# Patient Record
Sex: MALE | Race: WHITE | NOT HISPANIC OR LATINO | Employment: FULL TIME | URBAN - METROPOLITAN AREA
[De-identification: names, ages, dates, MRNs, and addresses within clinical notes are randomized per-mention and may not be internally consistent; named-entity substitution may affect disease eponyms.]

---

## 2017-03-14 RX ORDER — ATORVASTATIN CALCIUM 80 MG/1
TABLET, FILM COATED ORAL
Qty: 90 TABLET | Refills: 1 | Status: SHIPPED | OUTPATIENT
Start: 2017-03-14 | End: 2017-07-05 | Stop reason: SDUPTHER

## 2017-07-05 RX ORDER — ATORVASTATIN CALCIUM 80 MG/1
80 TABLET, FILM COATED ORAL NIGHTLY
Qty: 90 TABLET | Refills: 1 | Status: SHIPPED | OUTPATIENT
Start: 2017-07-05 | End: 2018-06-24 | Stop reason: SDUPTHER

## 2017-08-02 ENCOUNTER — OFFICE VISIT (OUTPATIENT)
Dept: ENDOCRINOLOGY | Age: 47
End: 2017-08-02

## 2017-08-02 VITALS
OXYGEN SATURATION: 96 % | DIASTOLIC BLOOD PRESSURE: 70 MMHG | WEIGHT: 260 LBS | SYSTOLIC BLOOD PRESSURE: 112 MMHG | HEIGHT: 73 IN | BODY MASS INDEX: 34.46 KG/M2 | HEART RATE: 70 BPM

## 2017-08-02 DIAGNOSIS — E78.5 HYPERLIPIDEMIA, UNSPECIFIED HYPERLIPIDEMIA TYPE: ICD-10-CM

## 2017-08-02 DIAGNOSIS — G47.30 SLEEP APNEA, UNSPECIFIED TYPE: ICD-10-CM

## 2017-08-02 DIAGNOSIS — Z96.41 INSULIN PUMP STATUS: ICD-10-CM

## 2017-08-02 DIAGNOSIS — Z46.81 INSULIN PUMP TITRATION: ICD-10-CM

## 2017-08-02 DIAGNOSIS — E10.9 TYPE 1 DIABETES MELLITUS WITHOUT COMPLICATION (HCC): Primary | ICD-10-CM

## 2017-08-02 PROCEDURE — 99214 OFFICE O/P EST MOD 30 MIN: CPT | Performed by: INTERNAL MEDICINE

## 2017-08-02 NOTE — PROGRESS NOTES
Subjective   Rohith Henley is a 47 y.o. male.     HPI Comments: F/u for dm 1,hyperlipidemia,sleep apnea / testing bs 2-4  x day / last dm eye exam 1/27/17 with dr Almanza / last dm foot exam today with dr Gomez     Diabetes     Hyperlipidemia     Sleep Apnea        Patient is a 46-year-old male who came in for follow-up.  He has type 1 diabetes mellitus and started on a Medtronic 630 G insulin pump in 10/16     Basal rates: 12 AM is 2.1.  3 AM is 2.4.  6 AM is 3.5.       Bolus: 1 unit per 10 g of carbohydrate.     Sensitivity: 25     Target: 100-130.     He has a continuous glucose sensor in place.  Fasting blood sugar has been 140's.  Lunchtime blood sugars 160's.  Suppertime blood sugars 120-150.   He had 2 hypoglycemic episode at 2-3 AM over the past month.  His last meal was last night.     His last eye examination was in 1/17.  He has no retinopathy.  He denies any paresthesias.  He has no microalbuminuria on urine sample taken last March 2016.     He has hyperlipidemia and has been on Lipitor 40 mg once a day.  He denies any myalgia.  He has gained 8 lbs since 11/17.     He had a sleep study and was told to have mild sleep apnea.  He is not on CPAP.    The following portions of the patient's history were reviewed and updated as appropriate: allergies, current medications, past family history, past medical history, past social history, past surgical history and problem list.    Review of Systems   Constitutional: Negative.    HENT: Negative.    Eyes: Negative.    Respiratory: Negative.    Cardiovascular: Negative.    Gastrointestinal: Negative.    Endocrine: Negative.    Genitourinary: Negative.    Musculoskeletal: Positive for back pain (lower ).   Skin: Negative.    Allergic/Immunologic: Negative.    Neurological: Negative.    Hematological: Negative.    Psychiatric/Behavioral: Positive for sleep disturbance ( sleep apnea not on machine ).       Objective      Vitals:    08/02/17 1022   BP: 112/70   BP  "Location: Right arm   Patient Position: Sitting   Cuff Size: Large Adult   Pulse: 70   SpO2: 96%   Weight: 260 lb (118 kg)   Height: 73\" (185.4 cm)     Physical Exam   Constitutional: He is oriented to person, place, and time. He appears well-developed and well-nourished. No distress.   HENT:   Head: Normocephalic.   Nose: Nose normal.   Mouth/Throat: No oropharyngeal exudate.   Eyes: Conjunctivae and EOM are normal. Right eye exhibits no discharge. Left eye exhibits no discharge. No scleral icterus.   Neck: Neck supple. No JVD present. No tracheal deviation present. No thyromegaly present.   Cardiovascular: Normal rate, regular rhythm, normal heart sounds and intact distal pulses.  Exam reveals no gallop and no friction rub.    No murmur heard.  Pulmonary/Chest: Effort normal and breath sounds normal. No respiratory distress. He has no wheezes. He has no rales. He exhibits no tenderness.   Abdominal: Soft. Bowel sounds are normal. He exhibits no distension and no mass. There is no tenderness. No hernia.   Musculoskeletal: Normal range of motion. He exhibits no edema, tenderness or deformity.   No plantar ulcer   Lymphadenopathy:     He has no cervical adenopathy.   Neurological: He is alert and oriented to person, place, and time. He displays normal reflexes. Coordination normal.   Intact light touch   Skin: Skin is warm and dry. No rash noted. No erythema. No pallor.   Psychiatric: He has a normal mood and affect. His behavior is normal.     Office Visit on 11/30/2016   Component Date Value Ref Range Status   • Glucose 11/30/2016 244* 65 - 99 mg/dL Final   • BUN 11/30/2016 12  6 - 20 mg/dL Final   • Creatinine 11/30/2016 1.16  0.76 - 1.27 mg/dL Final   • eGFR Non  Am 11/30/2016 68  >60 mL/min/1.73 Final   • eGFR African Am 11/30/2016 82  >60 mL/min/1.73 Final   • BUN/Creatinine Ratio 11/30/2016 10.3  7.0 - 25.0 Final   • Sodium 11/30/2016 142  136 - 145 mmol/L Final   • Potassium 11/30/2016 4.9  3.5 - 5.2 " mmol/L Final   • Chloride 11/30/2016 101  98 - 107 mmol/L Final   • Total CO2 11/30/2016 28.7  22.0 - 29.0 mmol/L Final   • Calcium 11/30/2016 9.7  8.6 - 10.5 mg/dL Final   • Total Protein 11/30/2016 7.2  6.0 - 8.5 g/dL Final   • Albumin 11/30/2016 4.50  3.50 - 5.20 g/dL Final   • Globulin 11/30/2016 2.7  gm/dL Final   • A/G Ratio 11/30/2016 1.7  g/dL Final   • Total Bilirubin 11/30/2016 0.6  0.1 - 1.2 mg/dL Final   • Alkaline Phosphatase 11/30/2016 96  39 - 117 U/L Final   • AST (SGOT) 11/30/2016 14  1 - 40 U/L Final   • ALT (SGPT) 11/30/2016 20  1 - 41 U/L Final   • Total Cholesterol 11/30/2016 126  0 - 200 mg/dL Final   • Triglycerides 11/30/2016 53  0 - 150 mg/dL Final   • HDL Cholesterol 11/30/2016 42  40 - 60 mg/dL Final   • VLDL Cholesterol 11/30/2016 10.6  5 - 40 mg/dL Final   • LDL Cholesterol  11/30/2016 73  0 - 100 mg/dL Final   • Hemoglobin A1C 11/30/2016 10.20* 4.80 - 5.60 % Final    Comment: Hemoglobin A1C Ranges:  Increased Risk for Diabetes  5.7% to 6.4%  Diabetes                     >= 6.5%  Diabetic Goal                < 7.0%     • Fructosamine 11/30/2016 427* 0 - 285 umol/L Final    Comment: Published reference interval for apparently healthy subjects  between age 20 and 60 is 205 - 285 umol/L and in a poorly  controlled diabetic population is 228 - 563 umol/L with a  mean of 396 umol/L.       Assessment/Plan   Rohith was seen today for diabetes, hyperlipidemia and sleep apnea.    Diagnoses and all orders for this visit:    Type 1 diabetes mellitus without complication  -     Comprehensive Metabolic Panel  -     Hemoglobin A1c  -     TSH  -     T4, Free  -     Microalbumin / Creatinine Urine Ratio    Insulin pump titration  -     Comprehensive Metabolic Panel  -     Hemoglobin A1c  -     TSH  -     T4, Free    Insulin pump status  -     TSH  -     T4, Free    Hyperlipidemia, unspecified hyperlipidemia type  -     Lipid Panel  -     TSH  -     T4, Free    Sleep apnea, unspecified type  -     TSH  -      T4, Free      Change basal rate:  12 AM is 2.0.  3 AM is 2.2.  6 AM is 3.5.  Continue with current bolus and sensitivity settings.  Check hemoglobin A1c and urine microalbumin.  Continue Lipitor 40 mg once a day.  Check lipid profile.  Check thyroid function tests.    Send copy of my notes and labs to Dr. Frankie Merritt    RTC 4 mos.

## 2017-08-03 LAB
ALBUMIN SERPL-MCNC: 4.6 G/DL (ref 3.5–5.2)
ALBUMIN/CREAT UR: 2.5 MG/G CREAT (ref 0–30)
ALBUMIN/GLOB SERPL: 1.6 G/DL
ALP SERPL-CCNC: 95 U/L (ref 39–117)
ALT SERPL-CCNC: 24 U/L (ref 1–41)
AST SERPL-CCNC: 17 U/L (ref 1–40)
BILIRUB SERPL-MCNC: 0.7 MG/DL (ref 0.1–1.2)
BUN SERPL-MCNC: 9 MG/DL (ref 6–20)
BUN/CREAT SERPL: 8.6 (ref 7–25)
CALCIUM SERPL-MCNC: 10 MG/DL (ref 8.6–10.5)
CHLORIDE SERPL-SCNC: 103 MMOL/L (ref 98–107)
CHOLEST SERPL-MCNC: 133 MG/DL (ref 0–200)
CO2 SERPL-SCNC: 28.1 MMOL/L (ref 22–29)
CREAT SERPL-MCNC: 1.05 MG/DL (ref 0.76–1.27)
CREAT UR-MCNC: 134.9 MG/DL
GLOBULIN SER CALC-MCNC: 2.9 GM/DL
GLUCOSE SERPL-MCNC: 58 MG/DL (ref 65–99)
HBA1C MFR BLD: 9.7 % (ref 4.8–5.6)
HDLC SERPL-MCNC: 48 MG/DL (ref 40–60)
LDLC SERPL CALC-MCNC: 73 MG/DL (ref 0–100)
MICROALBUMIN UR-MCNC: 3.4 UG/ML
POTASSIUM SERPL-SCNC: 4.3 MMOL/L (ref 3.5–5.2)
PROT SERPL-MCNC: 7.5 G/DL (ref 6–8.5)
SODIUM SERPL-SCNC: 144 MMOL/L (ref 136–145)
T4 FREE SERPL-MCNC: 1.26 NG/DL (ref 0.93–1.7)
TRIGL SERPL-MCNC: 60 MG/DL (ref 0–150)
TSH SERPL DL<=0.005 MIU/L-ACNC: 1.66 MIU/ML (ref 0.27–4.2)
VLDLC SERPL CALC-MCNC: 12 MG/DL (ref 5–40)

## 2017-12-13 ENCOUNTER — OFFICE VISIT (OUTPATIENT)
Dept: ENDOCRINOLOGY | Age: 47
End: 2017-12-13

## 2017-12-13 VITALS
SYSTOLIC BLOOD PRESSURE: 126 MMHG | DIASTOLIC BLOOD PRESSURE: 80 MMHG | BODY MASS INDEX: 33.8 KG/M2 | WEIGHT: 255 LBS | HEIGHT: 73 IN | OXYGEN SATURATION: 96 % | HEART RATE: 78 BPM

## 2017-12-13 DIAGNOSIS — E78.5 HYPERLIPIDEMIA, UNSPECIFIED HYPERLIPIDEMIA TYPE: ICD-10-CM

## 2017-12-13 DIAGNOSIS — Z96.41 INSULIN PUMP STATUS: ICD-10-CM

## 2017-12-13 DIAGNOSIS — E10.9 TYPE 1 DIABETES MELLITUS WITHOUT COMPLICATION (HCC): Primary | ICD-10-CM

## 2017-12-13 PROCEDURE — 99214 OFFICE O/P EST MOD 30 MIN: CPT | Performed by: INTERNAL MEDICINE

## 2017-12-13 NOTE — PROGRESS NOTES
Subjective   Rohith Henley is a 47 y.o. male.     HPI Comments: F/u for dm 1,hyperlipidemia, sleep apnea / testing bs2-4 x day / last dm eye exam 1/27/17 with dr Almanza / last dm foot exam 8/2/17 with dr Gomez / flu vaccine declined     Diabetes     Hyperlipidemia     Sleep Apnea   Associated symptoms include joint swelling (elbow pain  / knees ).      Patient is a 47-year-old male who came in for follow-up.  He has type 1 diabetes mellitus and started on a Medtronic 630 G insulin pump in 10/16      Basal rates: 12 AM is 2.1.  3 AM is 2.4.  6 AM is 3.5.        Bolus: 1 unit per 10 g of carbohydrate.      Sensitivity: 25      Target: 100-130.      He has a continuous glucose sensor in place.  He did not bring his blood sugar records.   He had 2 hypoglycemic episode at 2-3 AM over the past month.  His last meal was last night.      His last eye examination was in 1/17.  He has no retinopathy.  He denies any paresthesias.  He has no microalbuminuria on urine sample taken last 8/17      He has hyperlipidemia and has been on Lipitor 40 mg once a day.  He denies any myalgia.  He has lost 5 lbs since 8/17.      He had a sleep study and was told to have mild sleep apnea.  He is not on CPAP.    He is seeing an orthopedic surgeon for bilateral knee and elbow pain.  He is on diclofenac as needed.    He had flu-like illness after flu vaccine.     The following portions of the patient's history were reviewed and updated as appropriate: allergies, current medications, past family history, past medical history, past social history, past surgical history and problem list.    Review of Systems   Constitutional: Negative.    HENT: Negative.    Eyes: Negative.    Respiratory: Negative.    Cardiovascular: Negative.    Gastrointestinal: Negative.    Endocrine: Negative.    Genitourinary: Negative.    Musculoskeletal: Positive for back pain and joint swelling (elbow pain  / knees ).   Skin: Negative.    Allergic/Immunologic: Negative.   "  Neurological: Negative.    Hematological: Negative.    Psychiatric/Behavioral: Sleep disturbance: mild sleep apnea not using machine         Objective      Vitals:    12/13/17 1222   BP: 126/80   BP Location: Right arm   Patient Position: Sitting   Cuff Size: Large Adult   Pulse: 78   SpO2: 96%   Weight: 116 kg (255 lb)   Height: 185.4 cm (72.99\")     Physical Exam   Constitutional: He is oriented to person, place, and time. He appears well-developed and well-nourished. No distress.   HENT:   Head: Normocephalic.   Nose: Nose normal.   Mouth/Throat: No oropharyngeal exudate.   Eyes: Conjunctivae and EOM are normal. Right eye exhibits no discharge. Left eye exhibits no discharge. No scleral icterus.   Neck: Neck supple. No JVD present. No tracheal deviation present. No thyromegaly present.   Cardiovascular: Normal rate, regular rhythm, normal heart sounds and intact distal pulses.  Exam reveals no gallop and no friction rub.    No murmur heard.  Pulmonary/Chest: Effort normal and breath sounds normal. No respiratory distress. He has no wheezes. He has no rales. He exhibits no tenderness.   Abdominal: Soft. Bowel sounds are normal. He exhibits no distension and no mass. There is no tenderness. No hernia.   Musculoskeletal: Normal range of motion. He exhibits no edema, tenderness or deformity.   Lymphadenopathy:     He has no cervical adenopathy.   Neurological: He is alert and oriented to person, place, and time. He has normal reflexes. He displays normal reflexes. No cranial nerve deficit. Coordination normal.   Intact light touch   Skin: Skin is warm and dry. No rash noted. No erythema.   Psychiatric: He has a normal mood and affect. His behavior is normal.     Office Visit on 08/02/2017   Component Date Value Ref Range Status   • Glucose 08/02/2017 58* 65 - 99 mg/dL Final   • BUN 08/02/2017 9  6 - 20 mg/dL Final   • Creatinine 08/02/2017 1.05  0.76 - 1.27 mg/dL Final   • eGFR Non  Am 08/02/2017 76  >60 " mL/min/1.73 Final   • eGFR  Am 08/02/2017 92  >60 mL/min/1.73 Final   • BUN/Creatinine Ratio 08/02/2017 8.6  7.0 - 25.0 Final   • Sodium 08/02/2017 144  136 - 145 mmol/L Final   • Potassium 08/02/2017 4.3  3.5 - 5.2 mmol/L Final   • Chloride 08/02/2017 103  98 - 107 mmol/L Final   • Total CO2 08/02/2017 28.1  22.0 - 29.0 mmol/L Final   • Calcium 08/02/2017 10.0  8.6 - 10.5 mg/dL Final   • Total Protein 08/02/2017 7.5  6.0 - 8.5 g/dL Final   • Albumin 08/02/2017 4.60  3.50 - 5.20 g/dL Final   • Globulin 08/02/2017 2.9  gm/dL Final   • A/G Ratio 08/02/2017 1.6  g/dL Final   • Total Bilirubin 08/02/2017 0.7  0.1 - 1.2 mg/dL Final   • Alkaline Phosphatase 08/02/2017 95  39 - 117 U/L Final   • AST (SGOT) 08/02/2017 17  1 - 40 U/L Final   • ALT (SGPT) 08/02/2017 24  1 - 41 U/L Final   • Total Cholesterol 08/02/2017 133  0 - 200 mg/dL Final   • Triglycerides 08/02/2017 60  0 - 150 mg/dL Final   • HDL Cholesterol 08/02/2017 48  40 - 60 mg/dL Final   • VLDL Cholesterol 08/02/2017 12  5 - 40 mg/dL Final   • LDL Cholesterol  08/02/2017 73  0 - 100 mg/dL Final   • Hemoglobin A1C 08/02/2017 9.70* 4.80 - 5.60 % Final    Comment: Hemoglobin A1C Ranges:  Increased Risk for Diabetes  5.7% to 6.4%  Diabetes                     >= 6.5%  Diabetic Goal                < 7.0%     • TSH 08/02/2017 1.660  0.270 - 4.200 mIU/mL Final   • Free T4 08/02/2017 1.26  0.93 - 1.70 ng/dL Final   • Creatinine, Urine 08/02/2017 134.9  Not Estab. mg/dL Final   • Microalbumin, Urine 08/02/2017 3.4  Not Estab. ug/mL Final   • Microalbumin/Creatinine Ratio Urine 08/02/2017 2.5  0.0 - 30.0 mg/g creat Final     Assessment/Plan   Rohith was seen today for diabetes, hyperlipidemia and sleep apnea.    Diagnoses and all orders for this visit:    Type 1 diabetes mellitus without complication  -     Comprehensive Metabolic Panel  -     Hemoglobin A1c  -     TSH  -     T4, Free    Insulin pump status    Hyperlipidemia, unspecified hyperlipidemia type  -      Lipid Panel  -     TSH  -     T4, Free    Sleep apnea, unspecified type      Continue current insulin pump settings.  Advised to check on whether he can be upgraded to Medtronic 670 G pump  Continue Lipitor.    Send copy of my notes and labs to Dr. Merritt.    RTC 4 mos

## 2017-12-14 LAB
ALBUMIN SERPL-MCNC: 4.6 G/DL (ref 3.5–5.2)
ALBUMIN/GLOB SERPL: 1.6 G/DL
ALP SERPL-CCNC: 95 U/L (ref 39–117)
ALT SERPL-CCNC: 28 U/L (ref 1–41)
AST SERPL-CCNC: 21 U/L (ref 1–40)
BILIRUB SERPL-MCNC: 1.1 MG/DL (ref 0.1–1.2)
BUN SERPL-MCNC: 11 MG/DL (ref 6–20)
BUN/CREAT SERPL: 11.3 (ref 7–25)
CALCIUM SERPL-MCNC: 9.6 MG/DL (ref 8.6–10.5)
CHLORIDE SERPL-SCNC: 100 MMOL/L (ref 98–107)
CHOLEST SERPL-MCNC: 137 MG/DL (ref 0–200)
CO2 SERPL-SCNC: 30.1 MMOL/L (ref 22–29)
CREAT SERPL-MCNC: 0.97 MG/DL (ref 0.76–1.27)
GFR SERPLBLD CREATININE-BSD FMLA CKD-EPI: 101 ML/MIN/1.73
GFR SERPLBLD CREATININE-BSD FMLA CKD-EPI: 83 ML/MIN/1.73
GLOBULIN SER CALC-MCNC: 2.8 GM/DL
GLUCOSE SERPL-MCNC: 271 MG/DL (ref 65–99)
HBA1C MFR BLD: 9.47 % (ref 4.8–5.6)
HDLC SERPL-MCNC: 49 MG/DL (ref 40–60)
INTERPRETATION: NORMAL
LDLC SERPL CALC-MCNC: 75 MG/DL (ref 0–100)
POTASSIUM SERPL-SCNC: 4.6 MMOL/L (ref 3.5–5.2)
PROT SERPL-MCNC: 7.4 G/DL (ref 6–8.5)
SODIUM SERPL-SCNC: 139 MMOL/L (ref 136–145)
T4 FREE SERPL-MCNC: 1.35 NG/DL (ref 0.93–1.7)
TRIGL SERPL-MCNC: 66 MG/DL (ref 0–150)
TSH SERPL DL<=0.005 MIU/L-ACNC: 1.75 MIU/ML (ref 0.27–4.2)
VLDLC SERPL CALC-MCNC: 13.2 MG/DL (ref 5–40)

## 2018-01-17 ENCOUNTER — OFFICE VISIT CONVERTED (OUTPATIENT)
Dept: FAMILY MEDICINE CLINIC | Age: 48
End: 2018-01-17
Attending: FAMILY MEDICINE

## 2018-06-26 RX ORDER — ATORVASTATIN CALCIUM 80 MG/1
TABLET, FILM COATED ORAL
Qty: 90 TABLET | Refills: 0 | Status: SHIPPED | OUTPATIENT
Start: 2018-06-26

## 2018-09-24 ENCOUNTER — OFFICE VISIT CONVERTED (OUTPATIENT)
Dept: FAMILY MEDICINE CLINIC | Age: 48
End: 2018-09-24
Attending: NURSE PRACTITIONER

## 2018-09-28 ENCOUNTER — CONVERSION ENCOUNTER (OUTPATIENT)
Dept: OTHER | Facility: HOSPITAL | Age: 48
End: 2018-09-28

## 2018-10-16 ENCOUNTER — OFFICE VISIT CONVERTED (OUTPATIENT)
Dept: FAMILY MEDICINE CLINIC | Age: 48
End: 2018-10-16
Attending: FAMILY MEDICINE

## 2018-10-16 ENCOUNTER — CONVERSION ENCOUNTER (OUTPATIENT)
Dept: CARDIOLOGY | Facility: CLINIC | Age: 48
End: 2018-10-16

## 2018-10-16 ENCOUNTER — OFFICE VISIT CONVERTED (OUTPATIENT)
Dept: CARDIOLOGY | Facility: CLINIC | Age: 48
End: 2018-10-16
Attending: INTERNAL MEDICINE

## 2018-11-06 ENCOUNTER — OFFICE VISIT CONVERTED (OUTPATIENT)
Dept: SURGERY | Facility: CLINIC | Age: 48
End: 2018-11-06
Attending: SURGERY

## 2018-11-06 ENCOUNTER — CONVERSION ENCOUNTER (OUTPATIENT)
Dept: SURGERY | Facility: CLINIC | Age: 48
End: 2018-11-06

## 2018-11-09 ENCOUNTER — OFFICE VISIT CONVERTED (OUTPATIENT)
Dept: FAMILY MEDICINE CLINIC | Age: 48
End: 2018-11-09
Attending: FAMILY MEDICINE

## 2019-01-29 ENCOUNTER — OFFICE VISIT CONVERTED (OUTPATIENT)
Dept: FAMILY MEDICINE CLINIC | Age: 49
End: 2019-01-29
Attending: NURSE PRACTITIONER

## 2019-04-12 ENCOUNTER — OFFICE VISIT CONVERTED (OUTPATIENT)
Dept: FAMILY MEDICINE CLINIC | Age: 49
End: 2019-04-12
Attending: FAMILY MEDICINE

## 2019-04-12 ENCOUNTER — HOSPITAL ENCOUNTER (OUTPATIENT)
Dept: OTHER | Facility: HOSPITAL | Age: 49
Discharge: HOME | End: 2019-04-12
Attending: FAMILY MEDICINE

## 2019-04-12 LAB
ALBUMIN SERPL-MCNC: 4.1 G/DL (ref 3.5–5)
ALBUMIN/GLOB SERPL: 1.4 {RATIO} (ref 1.4–2.6)
ALP SERPL-CCNC: 77 U/L (ref 53–128)
ALT SERPL-CCNC: 29 U/L (ref 10–40)
ANION GAP SERPL CALC-SCNC: 14 MMOL/L (ref 8–19)
AST SERPL-CCNC: 27 U/L (ref 15–50)
BILIRUB SERPL-MCNC: 0.28 MG/DL (ref 0.2–1.3)
BUN SERPL-MCNC: 20 MG/DL (ref 5–25)
BUN/CREAT SERPL: 19 {RATIO} (ref 6–20)
CALCIUM SERPL-MCNC: 9.1 MG/DL (ref 8.7–10.4)
CHLORIDE SERPL-SCNC: 105 MMOL/L (ref 99–111)
CHOLEST SERPL-MCNC: 107 MG/DL (ref 107–200)
CHOLEST/HDLC SERPL: 2.7 {RATIO} (ref 3–6)
CONV CO2: 26 MMOL/L (ref 22–32)
CONV CREATININE URINE, RANDOM: 47.2 MG/DL (ref 10–300)
CONV MICROALBUM.,U,RANDOM: <12 MG/L (ref 0–20)
CONV TOTAL PROTEIN: 7 G/DL (ref 6.3–8.2)
CREAT UR-MCNC: 1.05 MG/DL (ref 0.7–1.2)
EST. AVERAGE GLUCOSE BLD GHB EST-MCNC: 223 MG/DL
GFR SERPLBLD BASED ON 1.73 SQ M-ARVRAT: >60 ML/MIN/{1.73_M2}
GLOBULIN UR ELPH-MCNC: 2.9 G/DL (ref 2–3.5)
GLUCOSE SERPL-MCNC: 141 MG/DL (ref 70–99)
HBA1C MFR BLD: 9.4 % (ref 3.5–5.7)
HDLC SERPL-MCNC: 40 MG/DL (ref 40–60)
LDLC SERPL CALC-MCNC: 47 MG/DL (ref 70–100)
MICROALBUMIN/CREAT UR: 25.4 MG/G{CRE} (ref 0–25)
OSMOLALITY SERPL CALC.SUM OF ELEC: 297 MOSM/KG (ref 273–304)
POTASSIUM SERPL-SCNC: 3.9 MMOL/L (ref 3.5–5.3)
SODIUM SERPL-SCNC: 141 MMOL/L (ref 135–147)
TRIGL SERPL-MCNC: 98 MG/DL (ref 40–150)
VLDLC SERPL-MCNC: 20 MG/DL (ref 5–37)

## 2019-06-06 ENCOUNTER — TELEPHONE (OUTPATIENT)
Dept: ENDOCRINOLOGY | Age: 49
End: 2019-06-06

## 2019-06-06 NOTE — TELEPHONE ENCOUNTER
Patients spouse called and stated that Dr. Gomez is no longer in his network and His primary care is out until 6/17/2019.  The new Endocrinologist [facility] [phone] with a fax of [phone] will take a referral from previous Dr. The patient really needs to be seen because his insulin pump is no longer working.  Can you please help this patient get a referral and an appointment as soon as possible with [facility] endocrinology?

## 2019-07-19 ENCOUNTER — OFFICE VISIT CONVERTED (OUTPATIENT)
Dept: FAMILY MEDICINE CLINIC | Age: 49
End: 2019-07-19
Attending: NURSE PRACTITIONER

## 2019-10-19 ENCOUNTER — HOSPITAL ENCOUNTER (OUTPATIENT)
Dept: OTHER | Facility: HOSPITAL | Age: 49
Discharge: HOME | End: 2019-10-19

## 2019-10-19 LAB
ALBUMIN SERPL-MCNC: 4.2 G/DL (ref 3.5–5)
ALBUMIN/GLOB SERPL: 1.4 {RATIO} (ref 1.4–2.6)
ALP SERPL-CCNC: 68 U/L (ref 53–128)
ALT SERPL-CCNC: 16 U/L (ref 10–40)
ANION GAP SERPL CALC-SCNC: 16 MMOL/L (ref 8–19)
AST SERPL-CCNC: 14 U/L (ref 15–50)
BILIRUB SERPL-MCNC: 0.39 MG/DL (ref 0.2–1.3)
BUN SERPL-MCNC: 16 MG/DL (ref 5–25)
BUN/CREAT SERPL: 16 {RATIO} (ref 6–20)
CALCIUM SERPL-MCNC: 9.6 MG/DL (ref 8.7–10.4)
CHLORIDE SERPL-SCNC: 102 MMOL/L (ref 99–111)
CONV CO2: 25 MMOL/L (ref 22–32)
CONV TOTAL PROTEIN: 7.2 G/DL (ref 6.3–8.2)
CREAT UR-MCNC: 1.01 MG/DL (ref 0.7–1.2)
EST. AVERAGE GLUCOSE BLD GHB EST-MCNC: 240 MG/DL
GFR SERPLBLD BASED ON 1.73 SQ M-ARVRAT: >60 ML/MIN/{1.73_M2}
GLOBULIN UR ELPH-MCNC: 3 G/DL (ref 2–3.5)
GLUCOSE SERPL-MCNC: 309 MG/DL (ref 70–99)
HBA1C MFR BLD: 10 % (ref 3.5–5.7)
OSMOLALITY SERPL CALC.SUM OF ELEC: 299 MOSM/KG (ref 273–304)
POTASSIUM SERPL-SCNC: 4.5 MMOL/L (ref 3.5–5.3)
SODIUM SERPL-SCNC: 138 MMOL/L (ref 135–147)

## 2020-03-05 ENCOUNTER — OFFICE VISIT CONVERTED (OUTPATIENT)
Dept: FAMILY MEDICINE CLINIC | Age: 50
End: 2020-03-05
Attending: FAMILY MEDICINE

## 2020-04-08 ENCOUNTER — HOSPITAL ENCOUNTER (OUTPATIENT)
Dept: OTHER | Facility: HOSPITAL | Age: 50
Discharge: HOME | End: 2020-04-08
Attending: FAMILY MEDICINE

## 2020-09-17 ENCOUNTER — OFFICE VISIT CONVERTED (OUTPATIENT)
Dept: FAMILY MEDICINE CLINIC | Age: 50
End: 2020-09-17
Attending: FAMILY MEDICINE

## 2020-09-17 ENCOUNTER — HOSPITAL ENCOUNTER (OUTPATIENT)
Dept: OTHER | Facility: HOSPITAL | Age: 50
Discharge: HOME | End: 2020-09-17
Attending: FAMILY MEDICINE

## 2020-09-17 LAB
APPEARANCE UR: CLEAR
BACTERIA UR QL AUTO: ABNORMAL
BILIRUB UR QL: NEGATIVE
CASTS URNS QL MICRO: ABNORMAL /[LPF]
COLOR UR: YELLOW
CONV LEUKOCYTE ESTERASE: NEGATIVE
CONV UROBILINOGEN IN URINE BY AUTOMATED TEST STRIP: 1 {EHRLICHU}/DL (ref 0.1–1)
EPI CELLS #/AREA URNS HPF: ABNORMAL /[HPF]
GLUCOSE 24H UR-MCNC: NEGATIVE MG/DL
HGB UR QL STRIP: ABNORMAL
KETONES UR QL STRIP: NEGATIVE MG/DL
MUCOUS THREADS URNS QL MICRO: ABNORMAL
NITRITE UR-MCNC: NEGATIVE MG/ML
PH UR STRIP.AUTO: 5.5 [PH] (ref 5–8)
PROT UR-MCNC: NEGATIVE MG/DL
RBC # BLD AUTO: ABNORMAL /[HPF]
SP GR UR STRIP: 1.02 (ref 1–1.03)
SPECIMEN SOURCE: ABNORMAL
UNIDENT CRYS URNS QL MICRO: ABNORMAL /[HPF]
WBC #/AREA URNS HPF: ABNORMAL /[HPF]

## 2020-10-02 ENCOUNTER — OFFICE VISIT CONVERTED (OUTPATIENT)
Dept: FAMILY MEDICINE CLINIC | Age: 50
End: 2020-10-02
Attending: FAMILY MEDICINE

## 2021-05-16 VITALS — RESPIRATION RATE: 16 BRPM | WEIGHT: 246.31 LBS | HEIGHT: 73 IN | BODY MASS INDEX: 32.64 KG/M2

## 2021-05-16 VITALS
HEIGHT: 73 IN | WEIGHT: 242 LBS | SYSTOLIC BLOOD PRESSURE: 117 MMHG | BODY MASS INDEX: 32.07 KG/M2 | DIASTOLIC BLOOD PRESSURE: 68 MMHG | HEART RATE: 77 BPM

## 2021-05-18 NOTE — PROGRESS NOTES
Rohith Henley  1970     Office/Outpatient Visit    Visit Date: Thu, Sep 17, 2020 01:49 pm    Provider: Frankie Merritt MD (Assistant: Yelena Whitaker MA)    Location: [facility]        Electronically signed by Frankie Merritt MD on  09/17/2020 09:26:18 PM                             Subjective:        CC: Ronald is a 50 year old White male.  He presents with testicle pain on right side with also having groin pain.          HPI:           PHQ-9 Depression Screening: Completed form scanned and in chart; Total Score 3           In regard to the right testicular pain, this is scrotal pain.   The pain began 3 days ago.  The size of the mass has remained constant since it was first found.   He describes it as moderate in severity and a dull ache.  He denies associated dysuria, flank pain, urinary frequency and urgency.      ROS:     CONSTITUTIONAL:  Negative for chills and fever.      GASTROINTESTINAL:  Negative for abdominal pain, nausea and vomiting.      GENITOURINARY:  Negative for dysuria and hematuria.      MUSCULOSKELETAL:  Negative for back pain and myalgias.      NEUROLOGICAL:  Negative for headaches.          Past Medical History / Family History / Social History:         Last Reviewed on 9/17/2020 09:06 PM by Frankie Merritt    Past Medical History:             PAST MEDICAL HISTORY         Hospitalizations: CONCUSSION         CURRENT MEDICAL PROVIDERS:    Pulmonologist    ENDOCRINOLOGIST         Surgical History:         Positive for    Arthroscopy: RIGHT KNEE;;; ;     Positive for    Treadmill stress test ( 2014--NEG;; );         Family History:         Positive for Hypertension.      Positive for Type 1 Diabetes.          Social History:     Occupation:  FOR [employer]     Marital Status:          Tobacco/Alcohol/Supplements:     Last Reviewed on 9/17/2020 09:06 PM by Frankie Merritt    Tobacco: He has never smoked.  Non-drinker      Caffeine:  He admits to consuming caffeine via soda ( 5 servings per day ).          Substance Abuse History:     Last Reviewed on 9/24/2018 11:13 AM by Bridgette Sarmiento        Mental Health History:     Last Reviewed on 9/24/2018 11:13 AM by Bridgette Sarmiento        Communicable Diseases (eg STDs):     Last Reviewed on 9/24/2018 11:13 AM by Bridgette Sarmiento        Current Problems:     Last Reviewed on 9/17/2020 09:06 PM by Frankie Merritt    High cholesterol    Obstructive sleep apnea (adult) (pediatric)    Type 1 diabetes mellitus without complications    Primary generalized (osteo)arthritis    Right testicular pain    Encounter for screening for depression        Immunizations:     None        Allergies:     Last Reviewed on 9/17/2020 09:06 PM by Frankie Merritt    No Known Allergies.        Current Medications:     Last Reviewed on 9/17/2020 09:06 PM by Frankie Merritt    aspirin 81 mg oral tablet, delayed release (enteric coated) [1 tab daily]    Mobic 15mg Tablet [1 tab daily]    atorvastatin 80 mg oral tablet [1 tab daily]    Novolog in pump     Cymbalta 30 mg oral capsule,delayed release (enteric coated) [1 capsule daily]        Objective:        Vitals:         Current: 9/17/2020 1:57:25 PM    Ht:  6 ft, 0.5 in;  Wt: 255.8 lbs;  BMI: 34.2T: 97.8 F (temporal);  BP: 127/74 mm Hg (right arm, sitting);  P: 77 bpm (right arm (BP Cuff), sitting);  sCr: 1.05 mg/dL;  GFR: 96.84        Exams:     PHYSICAL EXAM:     GENERAL: Vitals recorded well developed, well nourished;  well groomed;  no apparent distress;     RESPIRATORY: normal respiratory rate and pattern with no distress; normal breath sounds with no rales, rhonchi, wheezes or rubs;     CARDIOVASCULAR: normal rate; rhythm is regular;     GASTROINTESTINAL: nontender;     GENITOURINARY: testes: right testicular tenderness; right cord tenderness; NONE testicular mass; None apparent inguinal hernia;     NEUROLOGIC: GROSSLY INTACT          Assessment:         N50.811   Right testicular pain       Z13.31   Encounter for screening for depression           ORDERS:         Meds Prescribed:       [New Rx] Bactrim -160 mg oral tablet [take 1 tablet by oral route BID], #30 (thirty) tablets, Refills: 0 (zero)         Lab Orders:       [id number]  BDUAM - [facility] Urinalysis, automated, with micro  (Send-Out)              Other Orders:         Depression screen negative  (In-House)                      Plan:         Right testicular pain    LABORATORY:  Labs ordered to be performed today include urinalysis with micro.      FOLLOW-UP: Schedule follow-up appointments on a p.r.n. basis.  Observe for now Consider further workup Go to ER if worse.            Prescriptions:       [New Rx] Bactrim -160 mg oral tablet [take 1 tablet by oral route BID], #30 (thirty) tablets, Refills: 0 (zero)           Orders:       [id number]  BDUAM - H Urinalysis, automated, with micro  (Send-Out)              Encounter for screening for depression    MIPS Negative Depression Screen           Orders:         Depression screen negative  (In-House)                  Patient Recommendations:        For  Right testicular pain:    Schedule follow-up appointments as needed.              Charge Capture:         Primary Diagnosis:     N50.811  Right testicular pain           Orders:      [id number]  Office/outpatient visit; established patient, level 3  (In-House)              Z13.31  Encounter for screening for depression           Orders:        Depression screen negative  (In-House)

## 2021-05-18 NOTE — PROGRESS NOTES
"Rohith Henley  1970     Office/Outpatient Visit    Visit Date: Fri, Oct 2, 2020 09:37 am    Provider: Frankie Merritt MD (Assistant: Kathleen Giraldo MA)    Location: [facility]        Electronically signed by Frankie Merritt MD on  10/02/2020 11:39:01 AM                             Subjective:        CC: Ronald is a 50 year old White male.  knot on shoulder         HPI:           Complaint of obstructive sleep apnea (adult) (pediatric)..  The symptom began years ago.  The severity is of mild intensity.  WAS POSITIONAL ONLY PER SLEEP STUDY, DID NOT START CPAP, DOING BETTER MORE RECENTLY       (Improving)     In regard to the right testicular pain, this is scrotal pain.   The pain began 3 weeks ago.   He describes it as moderate in severity and a dull ache.  He denies associated dysuria, flank pain, urinary frequency and urgency.  NEG U/A.  IMPROVING ON ABX           Complaint of benign lipomatous neoplasm of skin and subcutaneous tissue of right arm..  The symptom began several weeks ago.  The severity is of mild intensity.  \"KNOT\" ON RIGHT SHOULDER BLADE     ROS:     CONSTITUTIONAL:  Negative for chills and fever.      CARDIOVASCULAR:  Negative for chest pain.      RESPIRATORY:  Negative for dyspnea.      GASTROINTESTINAL:  Negative for abdominal pain, nausea and vomiting.      GENITOURINARY:  Negative for dysuria and hematuria.      MUSCULOSKELETAL:  Negative for back pain and myalgias.      NEUROLOGICAL:  Negative for headaches.          Past Medical History / Family History / Social History:         Last Reviewed on 10/02/2020 09:53 AM by Frankie Merritt    Past Medical History:             PAST MEDICAL HISTORY         Hospitalizations: CONCUSSION         CURRENT MEDICAL PROVIDERS:    Pulmonologist    ENDOCRINOLOGIST         PREVENTIVE HEALTH MAINTENANCE             EYE EXAM: Diabetic Eye Exam during this calendar year and results are in chart was last done " 12/19/19         Surgical History:         Positive for    Arthroscopy: RIGHT KNEE;;; ;     Positive for    Treadmill stress test ( 2014--NEG;; );         Family History:         Positive for Hypertension.      Positive for Type 1 Diabetes.          Social History:     Occupation:  FOR [employer];     Marital Status:          Tobacco/Alcohol/Supplements:     Last Reviewed on 10/02/2020 09:53 AM by Frankie Merritt    Tobacco: He has never smoked.  Non-drinker     Caffeine:  He admits to consuming caffeine via soda ( 5 servings per day ).          Substance Abuse History:     Last Reviewed on 9/24/2018 11:13 AM by Bridgette Sarmiento        Mental Health History:     Last Reviewed on 9/24/2018 11:13 AM by Bridgette Sarmiento        Communicable Diseases (eg STDs):     Last Reviewed on 9/24/2018 11:13 AM by Bridgette Sarmiento        Current Problems:     Last Reviewed on 10/02/2020 09:53 AM by Frankie Merritt    High cholesterol    Obstructive sleep apnea (adult) (pediatric)    Type 1 diabetes mellitus without complications    Primary generalized (osteo)arthritis    Right testicular pain        Immunizations:     None        Allergies:     Last Reviewed on 10/02/2020 09:53 AM by Frankie Merritt    No Known Allergies.        Current Medications:     Last Reviewed on 10/02/2020 09:53 AM by Frankie Merritt    aspirin 81 mg oral tablet, delayed release (enteric coated) [1 tab daily]    Mobic 15mg Tablet [1 tab daily]    atorvastatin 80 mg oral tablet [1 tab daily]    Novolog in pump     Cymbalta 30 mg oral capsule,delayed release (enteric coated) [1 capsule daily]    Bactrim -160 mg oral tablet [take 1 tablet by oral route BID]    BASAGLAR     INJ 100UNIT        Objective:        Vitals:         Current: 10/2/2020 9:41:46 AM    Ht:  6 ft, 0.5 in;  Wt: 252.8 lbs;  BMI: 33.8T: 97.1 F (temporal);  BP: 132/75 mm Hg (left arm, sitting);  P: 74 bpm (left arm (BP Cuff),  sitting);  sCr: 1.05 mg/dL;  GFR: 96.36        Exams:     PHYSICAL EXAM:     GENERAL: Vitals recorded well developed, well nourished;  well groomed;  no apparent distress;     SKIN: 2 X 2 CM SOFT, WELL-DEMARCATED, NONTENDER NODULE, SUB-Q OVER THE RIGHT SCAPULA.;     MUSCULOSKELETAL: RIGHT SHOULODER WITH FULL ROM;     NEUROLOGIC: GROSSLY INTACT         Assessment:         G47.33   Obstructive sleep apnea (mild)       N50.811   Right testicular pain   (Improving)     D17.21   Benign lipoma of right scapula           ORDERS:         Meds Prescribed:       [Refilled] Bactrim -160 mg oral tablet [take 1 tablet by oral route BID], #30 (thirty) tablets, Refills: 0 (zero)                 Plan:         Obstructive sleep apnea (mild)        RECOMMENDATIONS given include: need for yearly flu shots.      FOLLOW-UP: Schedule follow-up appointments on a p.r.n. basis.  Observe for now         Right testicular pain    Consider further workup Observe as improved           Prescriptions:       [Refilled] Bactrim -160 mg oral tablet [take 1 tablet by oral route BID], #30 (thirty) tablets, Refills: 0 (zero)         Benign lipoma of right scapula    Observe for now Consider further workup             Patient Recommendations:        For  Obstructive sleep apnea (mild):    Obtain a flu shot each year.  Schedule follow-up appointments as needed.              Charge Capture:         Primary Diagnosis:     G47.33  Obstructive sleep apnea (mild)           Orders:      [id number]  Office/outpatient visit; established patient, level 3  (In-House)              N50.811  Right testicular pain     D17.21  Benign lipoma of right scapula

## 2021-05-18 NOTE — PROGRESS NOTES
Rohith HenleyAndrae 1970     Office/Outpatient Visit    Visit Date: Fri, Nov 9, 2018 08:50 am    Provider: Jude Jarvis,   (Assistant: Memo Leon)    Location: [facility]        Electronically signed by Jude Jarvis,   on  11/09/2018 12:26:16 PM                             SUBJECTIVE:        CC:     Ronald is a 48 year old White male.  was bit by his sister in laws dog on right hand, went to er and got an antibiotic, this is a follow up         HPI:     Pt was bit by a dog 2 days ago (Wednesday), went to ER and patient is following up as directed. This was pt's wife's sister's dog, a terrier-shcitzu mix. Dog was sitting in his lap and kind of bit him out of no where. Dog is UTD in its shots. He received a tetanus in the ER. No stiches were given, he was placed on augmentin.     Patient has a lump behind his left nipple that has been evaluated by Dr. Merritt with mammogram and pt went to surgeon recently for evaluation. Pt reports surgeon advised it does not appear to need excision but he does recommend getting a testosterone level.     ROS:     CONSTITUTIONAL:  Negative for fatigue and fever.      EYES:  Negative for blurred vision.      E/N/T:  Negative for diminished hearing and nasal congestion.      CARDIOVASCULAR:  Negative for chest pain and palpitations.      RESPIRATORY:  Negative for recent cough and dyspnea.      GASTROINTESTINAL:  Negative for abdominal pain, constipation, diarrhea, nausea and vomiting.      GENITOURINARY:  Negative for dysuria.      MUSCULOSKELETAL:  Negative for arthralgias and myalgias.      INTEGUMENTARY:  Positive for lacerations and punctures scattered on right hand, predominantly dorsum and left breast swelling.   Negative for atypical mole(s) or rash.      NEUROLOGICAL:  Negative for paresthesias and weakness.      PSYCHIATRIC:  Negative for anxiety, depression and sleep disturbance.          PMH/FMH/SH:     Last Reviewed on 10/16/2018 09:34 PM by Frankie Merritt  Silvino    Past Medical History:             PAST MEDICAL HISTORY         Hospitalizations: CONCUSSION         CURRENT MEDICAL PROVIDERS:    Pulmonologist    ENDOCRINOLOGIST         Surgical History:         Positive for    Arthroscopy: RIGHT KNEE;;; ; Dr. Turner     Positive for    Treadmill stress test ( 2014--NEG;; );         Family History:         Positive for Hypertension.      Positive for Type 1 Diabetes.          Social History:     Occupation:  FOR [employer];     Marital Status:          Tobacco/Alcohol/Supplements:     Last Reviewed on 10/16/2018 09:34 PM by Frankie Merritt    Tobacco: He has never smoked.  Non-drinker     Caffeine:  He admits to consuming caffeine via soda ( 5 servings per day ).          Substance Abuse History:     Last Reviewed on 9/24/2018 11:13 AM by Bridgette Sarmiento        Mental Health History:     Last Reviewed on 9/24/2018 11:13 AM by Bridgette Sarmiento        Communicable Diseases (eg STDs):     Last Reviewed on 9/24/2018 11:13 AM by Bridgette Sarmiento            Current Problems:     Last Reviewed on 10/16/2018 09:35 PM by Frankie Merritt    Breast lump     Diffuse arthralgia     Type 1 DM     Obstructive sleep apnea (CPAP pending)     Osteoarthritis of knee     High cholesterol     Breast mass     Chest pain, other type         Immunizations:     None        Allergies:     Last Reviewed on 10/16/2018 09:35 PM by Frankie Merritt      No Known Drug Allergies.         Current Medications:     Last Reviewed on 10/16/2018 09:35 PM by Frankie Merritt    Diclofenac Sodium 75mg Tablets, Enteric Coated 1 tab bid with food     Novolog in pump     Atorvastatin Calcium 80mg Tablet 1 tab daily     Aspirin (ASA) 81mg Tablets, Enteric Coated 1 tab daily         OBJECTIVE:        Vitals:         Current: 11/9/2018 8:57:25 AM    Ht:  6 ft, 0.5 in;  Wt: 249.2 lbs;  BMI: 33.3    T: 98 F (oral);  BP: 131/74 mm Hg (left arm, sitting);  P: 93  bpm (left arm (BP Cuff), sitting);  sCr: 1.1 mg/dL;  GFR: 93.39        Exams:     PHYSICAL EXAM:     GENERAL: Vitals recorded well developed, well nourished;  well groomed;  no apparent distress;     NECK: trachea is midline; thyroid is non-palpable;     RESPIRATORY: normal respiratory rate and pattern with no distress; normal breath sounds with no rales, rhonchi, wheezes or rubs;     CARDIOVASCULAR: normal rate; rhythm is regular;  no systolic murmur; no edema;     LYMPHATIC: no enlargement of cervical or facial nodes; no supraclavicular nodes; no axillary adenopathy;     SKIN: breast exam remarkable for a mass (2 X 2 CM, TENDER);  several lacerations and puncture wounds on right hand, predominatnly dorsum, worst on second/index finger.;     NEUROLOGIC: GROSSLY INTACT         ASSESSMENT           882.0   S61.451D  Dog bite to hand              DDx:     611.72   N63  Left Breast mass              DDx:         ORDERS:         Lab Orders:       [id number]  [facility] - [facility] Testosterone, free and Total weakly bound  (Send-Out)                   PLAN:          Dog bite to hand This appears to be healing well and there is no sign of infection. Pt advised he does not have to wear finger splint but can if it helps with soreness. Therese cleaned and bandaged pt's wound, which may take a couple weeks to heal given depth of wound.         Left Breast mass Testerone ordered at the request of surgeon for breast mass work up. Consider liver pathology.     LABORATORY:  Labs ordered to be performed today include Testosterone free and total.            Orders:       [id number]  [facility] - [facility] Testosterone, free and Total weakly bound  (Send-Out)               CHARGE CAPTURE           **Please note: ICD descriptions below are intended for billing purposes only and may not represent clinical diagnoses**        Primary Diagnosis:         882.0 Dog bite to hand            S61.451D    Open bite of right hand, subsequent encounter              Orders:           [id number]   Office/outpatient visit; established patient, level 4  (In-House)           611.72 Left Breast mass            N63    Unspecified lump in breast

## 2021-05-18 NOTE — PROGRESS NOTES
Rohith HenleyAndrae 1970     Office/Outpatient Visit    Visit Date: Fri, Jul 19, 2019 04:34 pm    Provider: Preethi Guadarrama N.P. (Assistant: Sarah Spurling, MA)    Location: [facility]        Electronically signed by Preethi Guadarrama N.P. on  07/23/2019 11:08:09 AM                             SUBJECTIVE:        CC:     Ronald is a 49 year old White male.  A few spots on his feet, and he is a diabetic. Per pt, the spots hurt him.  May need refills.          HPI:         Unspecified skin lesion noted.  wife has been exfoliating his feet.  in this process she has noted bruise like areas of each heel without any broken skin.  concerned about infection and he is diabetic.  denies redness or drainage of areas on each heel.  denies injury.  states diabetes is under good control.      ROS:     CONSTITUTIONAL:  Negative for chills, fatigue, fever, and weight change.      CARDIOVASCULAR:  Negative for chest pain, palpitations, tachycardia, orthopnea, and edema.      RESPIRATORY:  Negative for cough, dyspnea, and hemoptysis.      GASTROINTESTINAL:  Negative for abdominal pain, heartburn, constipation, diarrhea, and stool changes.      INTEGUMENTARY:  Positive for bilateral heel lesions.      NEUROLOGICAL:  Negative for dizziness, headaches, paresthesias, and weakness.          PMH/FMH/SH:     Last Reviewed on 4/13/2019 06:04 PM by Frankie Merritt    Past Medical History:             PAST MEDICAL HISTORY         Hospitalizations: CONCUSSION         CURRENT MEDICAL PROVIDERS:    Pulmonologist    ENDOCRINOLOGIST         Surgical History:         Positive for    Arthroscopy: RIGHT KNEE;;; ;     Positive for    Treadmill stress test ( 2014--NEG;; );         Family History:         Positive for Hypertension.      Positive for Type 1 Diabetes.          Social History:     Occupation:  FOR [employer];     Marital Status:          Tobacco/Alcohol/Supplements:     Last Reviewed on 4/13/2019  06:03 PM by Frankie Merritt    Tobacco: He has never smoked.  Non-drinker     Caffeine:  He admits to consuming caffeine via soda ( 5 servings per day ).          Substance Abuse History:     Last Reviewed on 9/24/2018 11:13 AM by Bridgette Sarmiento        Mental Health History:     Last Reviewed on 9/24/2018 11:13 AM by Bridgette Sarmiento        Communicable Diseases (eg STDs):     Last Reviewed on 9/24/2018 11:13 AM by Bridgette Sarmiento            Current Problems:     Last Reviewed on 4/13/2019 06:05 PM by Frankie Merritt    Diffuse arthralgia     Type 1 DM     Obstructive sleep apnea (CPAP pending)     High cholesterol     Screening for depression         Immunizations:     None        Allergies:     Last Reviewed on 4/13/2019 06:03 PM by Frankie Merritt      No Known Drug Allergies.         Current Medications:     Last Reviewed on 7/19/2019 04:37 PM by Spurling, Sarah C    Mobic 15mg Tablet 1 tab daily     Cymbalta 30mg Capsules, Delayed Release 1 capsule daily     Novolog in pump     Atorvastatin Calcium 80mg Tablet 1 tab daily     Aspirin (ASA) 81mg Tablets, Enteric Coated 1 tab daily         OBJECTIVE:        Vitals:         Historical:     04/12/2019  BP:   124/71 mm Hg ( (right arm, , sitting, );)     04/12/2019  Wt:   255.8lbs        Current: 7/19/2019 4:38:19 PM    Ht:  6 ft, 0.5 in;  Wt: 244.4 lbs;  BMI: 32.7    T: 98.4 F (oral);  BP: 122/74 mm Hg (right arm, sitting);  P: 88 bpm (right arm (BP Cuff), sitting);  sCr: 1.05 mg/dL;  GFR: 96.01        Exams:     PHYSICAL EXAM:     GENERAL: no apparent distress;     RESPIRATORY: normal respiratory rate and pattern with no distress; normal breath sounds with no rales, rhonchi, wheezes or rubs;     CARDIOVASCULAR: normal rate; rhythm is regular;     Peripheral Pulses: dorsalis pedis: 2+ amplitude, no bruits; posterior tibial: 2+ amplitude, no bruits; no edema;     SKIN: bruising noted on the each heel mild and without broken skin:      MUSCULOSKELETAL:  Normal range of motion, strength and tone;     NEUROLOGIC: mental status: alert and oriented x 3; GROSSLY INTACT     PSYCHIATRIC:  appropriate affect and demeanor; normal speech pattern; grossly normal memory;     Left foot exam    Protective sensation using Monofilament test: NORMAL sensation. Patient detects .07 grams of force which is considered normal.    Vascular status: normal peripheral vascular exam with palpable dorsal pedal and posterior tibal pulses and brisk digital capillary refill    Skin is intact without sores or ulcers    Right foot exam    Protective sensation using Monofilament test: NORMAL sensation. Patient detects .07 grams of force which is considered normal.    Vascular status: normal peripheral vascular exam with palpable dorsal pedal and posterior tibal pulses and brisk digital capillary refill    Skin is intact without sores or ulcers         ASSESSMENT           239.2   D49.2  Unspecified skin lesion              DDx:     782.7   R23.3  Easy bruising              DDx:         ORDERS:         Meds Prescribed:       Doxycycline Monohydrate 100mg Capsules Take 1 capsule twice daily.  #14 (Fourteen) capsule(s) Refills: 0       Mupirocin 2% Topical Ointment apply affected area bid  #15 (Fifteen) gm Refills: 0         Lab Orders:       [id number]  [facility] - [facility] CBC w/o diff  (Send-Out)           Other Orders:       2028F  Foot examination performed (includes examination through visual inspection, sensory exam with monofi  (In-House)                   PLAN:          Unspecified skin lesion        RECOMMENDATIONS given include: cover as below.  follow up if not improving..            Prescriptions:       Doxycycline Monohydrate 100mg Capsules Take 1 capsule twice daily.  #14 (Fourteen) capsule(s) Refills: 0       Mupirocin 2% Topical Ointment apply affected area bid  #15 (Fifteen) gm Refills: 0          Easy bruising     LABORATORY:  Labs ordered to be performed today include CBC W/O  DIFF.            Orders:       [id number]  [facility] - [facility] CBC w/o diff  (Send-Out)               Other Orders:       2028F  Foot examination performed (includes examination through visual inspection, sensory exam with monofi  (In-House)           CHARGE CAPTURE           **Please note: ICD descriptions below are intended for billing purposes only and may not represent clinical diagnoses**        Primary Diagnosis:         239.2 Unspecified skin lesion            D49.2    Neoplasm of unspecified behavior of bone, soft tissue, and skin              Orders:          [id number]   Office/outpatient visit; established patient, level 3  (In-House)           782.7 Easy bruising            R23.3    Spontaneous ecchymoses        Other Orders:           2028F   Foot examination performed (includes examination through visual inspection, sensory exam with monofi  (In-House)

## 2021-05-18 NOTE — PROGRESS NOTES
Lory Rohith CECILIO 1970     Office/Outpatient Visit    Visit Date: Tue, Jan 29, 2019 04:34 pm    Provider: Estefania Monet N.P. (Assistant: Yelena Whitaker MA)    Location: [facility]        Electronically signed by Estefania Monet N.P. on  01/31/2019 11:08:48 AM                             SUBJECTIVE:        CC:     Ronald is a 48 year old White male.  Patient states he is having swelling and R elbow pain x 2 wks. He states he has had simular pain in L elbow before and has had to do PT.          HPI:         Patient to be evaluated for elbow pain.  This is the right elbow.  The initial onset was 2 weeks ago.  The precipitating event seems to be overuse or repetitive activity and palet lita.  The location of the discomfort is deep pain over the joint.  It radiates to the forearm.  He describes the pain as throbbing.  There is some swelling in the joint, no erythema - tender to palpation     ROS:     CONSTITUTIONAL:  Negative for chills, fatigue and fever.      CARDIOVASCULAR:  Negative for chest pain and pedal edema.      RESPIRATORY:  Negative for recent cough and dyspnea.      MUSCULOSKELETAL:  Positive for arthralgias and (right elbow) limb pain ( right elbow ).   Negative for myalgias.      NEUROLOGICAL:  Positive for weakness ( right upper extremity ).   Negative for paresthesias.      ENDOCRINE:  Negative for hair loss, polydipsia and polyphagia.      ALLERGIC/IMMUNOLOGIC:  Negative for seasonal allergies.      PSYCHIATRIC:  Negative for anxiety, depression and suicidal thoughts.          PMH/FMH/SH:     Last Reviewed on 10/16/2018 09:34 PM by Frankie Merritt    Past Medical History:             PAST MEDICAL HISTORY         Hospitalizations: CONCUSSION         CURRENT MEDICAL PROVIDERS:    Pulmonologist    ENDOCRINOLOGIST         Surgical History:         Positive for    Arthroscopy: RIGHT KNEE;;; ; Dr. Turner     Positive for    Treadmill stress test ( 2014--NEG;; );         Family  History:         Positive for Hypertension.      Positive for Type 1 Diabetes.          Social History:     Occupation:  FOR [employer];     Marital Status:          Tobacco/Alcohol/Supplements:     Last Reviewed on 11/09/2018 08:55 AM by Memo Leon    Tobacco: He has never smoked.  Non-drinker     Caffeine:  He admits to consuming caffeine via soda ( 5 servings per day ).          Substance Abuse History:     Last Reviewed on 9/24/2018 11:13 AM by Bridgette Sarmiento        Mental Health History:     Last Reviewed on 9/24/2018 11:13 AM by Bridgette Sarmiento        Communicable Diseases (eg STDs):     Last Reviewed on 9/24/2018 11:13 AM by Bridgette Sarmiento            Current Problems:     Last Reviewed on 10/16/2018 09:35 PM by Frankie Merritt    Breast lump     Diffuse arthralgia     Type 1 DM     Obstructive sleep apnea (CPAP pending)     Osteoarthritis of knee     High cholesterol     Breast mass         Immunizations:     None        Allergies:     Last Reviewed on 11/09/2018 08:55 AM by Memo Leon      No Known Drug Allergies.         Current Medications:     Last Reviewed on 11/09/2018 08:55 AM by Memo Leon    Diclofenac Sodium 75mg Tablets, Enteric Coated 1 tab bid with food     Cymbalta 30mg Capsules, Delayed Release 1 capsule daily     Novolog in pump     Atorvastatin Calcium 80mg Tablet 1 tab daily     Aspirin (ASA) 81mg Tablets, Enteric Coated 1 tab daily     Augmentin         OBJECTIVE:        Vitals:         Current: 1/29/2019 4:42:28 PM    Ht:  6 ft, 0.5 in;  Wt: 255.8 lbs;  BMI: 34.2    T: 97.6 F (oral);  BP: 134/86 mm Hg (left arm, sitting);  P: 77 bpm (left arm (BP Cuff), sitting);  sCr: 1.1 mg/dL;  GFR: 94.44        Exams:     PHYSICAL EXAM:     GENERAL: Vitals recorded well developed, well nourished;  no apparent distress;     NECK: range of motion is normal;     RESPIRATORY: normal appearance and symmetric expansion of chest wall; normal respiratory rate  and pattern with no distress; normal breath sounds with no rales, rhonchi, wheezes or rubs;     CARDIOVASCULAR: normal rate; rhythm is regular;  no edema;     LYMPHATIC: no enlargement of cervical or facial nodes; no supraclavicular nodes;     SKIN: some swelling over the right elbow, no erythema, tender to palpation;     MUSCULOSKELETAL: normal gait; normal range of motion of all major muscle groups; pain with range of motion in: right elbow flexion and extension;     NEUROLOGIC: mental status: alert and oriented x 3;     PSYCHIATRIC: appropriate affect and demeanor; normal speech pattern; normal thought and perception;         ASSESSMENT           726.30   M70.31  Elbow bursitis, NOS              DDx:     250.01   E10.9  Type 1 DM              DDx:         ORDERS:         Meds Prescribed:       Medrol (Methylprednisolone) 4mg Dosepak Take as directed with food  #1 (One) dose pack Refills: 0       Mobic (Meloxicam) 15mg Tablet 1 tab daily  #30 (Thirty) tablet(s) Refills: 1                 PLAN:          Elbow bursitis, NOS Start Mobic after medrol pack complete           Prescriptions:       Medrol (Methylprednisolone) 4mg Dosepak Take as directed with food  #1 (One) dose pack Refills: 0       Mobic (Meloxicam) 15mg Tablet 1 tab daily  #30 (Thirty) tablet(s) Refills: 1          Type 1 DM Discussed need to monitor BS very closely - if BS elevate - stop the medrol pack and start the mobic instead             CHARGE CAPTURE           **Please note: ICD descriptions below are intended for billing purposes only and may not represent clinical diagnoses**        Primary Diagnosis:         726.30 Elbow bursitis, NOS            M70.31    Other bursitis of elbow, right elbow              Orders:          [id number]   Office/outpatient visit; established patient, level 3  (In-House)           250.01 Type 1 DM            E10.9    Type 1 diabetes mellitus without complications

## 2021-05-18 NOTE — PROGRESS NOTES
Rohith HenleyAndrae 1970     Office/Outpatient Visit    Visit Date: Mon, Sep 24, 2018 11:13 am    Provider: Fatoumata Delacruz N.P. (Assistant: Bridgette Sarmiento MA)    Location: [facility]        Electronically signed by Fatoumata Delacruz N.P. on  09/24/2018 12:22:30 PM                             SUBJECTIVE:        CC:     Ronald is a 48 year old White male.  Patient complains of chest pains and lump close to the left nipple.          HPI:         Patient to be evaluated for chest pain, other type.  The discomfort is located primarily in the left anterior chest.  There is no radiation.  The pain initially began 3 months ago.  Typically, individual episodes of chest pain last 5 to 10 minutes and occur 3-5 times per week.  He characterizes the pain as sharp.  There are no identifiable aggravating factors.  Pain improves with goes away on its own.  Prior workup includes an ECG ( normal ), a treadmill stress test ( normal ), and an echocardiogram ( normal ).  Cardiac risk factors include insulin dependent diabetes.          Breast lump details; he first noticed it 2-3 weeks ago.  The mass is characterized as painful, soft, and mobile.  It is located in the retro-areolar area of the left breast.  He has never had a mammogram.      ROS:     CONSTITUTIONAL:  Negative for chills and fever.      CARDIOVASCULAR:  Positive for chest pain.   Negative for palpitations.      RESPIRATORY:  Negative for dyspnea and frequent wheezing.      GASTROINTESTINAL:  Negative for abdominal pain and vomiting.      INTEGUMENTARY:  Positive for left breast lump.      PSYCHIATRIC:  Negative for depression and suicidal thoughts.          PM/[facility]/:     Last Reviewed on 9/24/2018 11:13 AM by Bridgette Sarmiento    Past Medical History:             PAST MEDICAL HISTORY         Hospitalizations: CONCUSSION         CURRENT MEDICAL PROVIDERS:    Pulmonologist    ENDOCRINOLOGIST         Surgical History:         Positive for    Arthroscopy: RIGHT  KNEE;;; ; Dr. Turner     Positive for    Treadmill stress test ( 2014--NEG;; );         Family History:         Positive for Hypertension.      Positive for Type 1 Diabetes.          Social History:     Occupation:  FOR [employer];     Marital Status:          Tobacco/Alcohol/Supplements:     Last Reviewed on 9/24/2018 11:17 AM by Bridgette Sarmiento    Tobacco: He has never smoked.  Non-drinker     Caffeine:  He admits to consuming caffeine via soda ( 5 servings per day ).          Substance Abuse History:     Last Reviewed on 9/24/2018 11:13 AM by Bridgette Sarmiento        Mental Health History:     Last Reviewed on 9/24/2018 11:13 AM by Bridgette Sarmiento        Communicable Diseases (eg STDs):     Last Reviewed on 9/24/2018 11:13 AM by Bridgette Sarmiento            Current Problems:     Last Reviewed on 9/24/2018 11:13 AM by Bridgette Sarmiento    Diffuse arthralgia     Type 1 DM     Obstructive sleep apnea (CPAP pending)     Osteoarthritis of knee     High cholesterol         Immunizations:     None        Allergies:     Last Reviewed on 9/24/2018 11:17 AM by Bridgette Sarmiento      No Known Drug Allergies.         Current Medications:     Last Reviewed on 9/24/2018 11:17 AM by Bridgette Sarmiento    Diclofenac Sodium 75mg Tablets, Enteric Coated 1 tab bid with food     Novolog in pump     Atorvastatin Calcium 80mg Tablet 1 tab daily     Aspirin (ASA) 81mg Tablets, Enteric Coated 1 tab daily         OBJECTIVE:        Vitals:         Current: 9/24/2018 11:17:08 AM    Ht:  6 ft, 0.5 in;  Wt: 240 lbs;  BMI: 32.1    T: 98.2 F (oral);  BP: 132/74 mm Hg (left arm, sitting);  P: 74 bpm (left arm (BP Cuff), sitting);  sCr: 1.1 mg/dL;  GFR: 91.91        Exams:     PHYSICAL EXAM:     GENERAL: well developed, well nourished;  no apparent distress;     RESPIRATORY: normal respiratory rate and pattern with no distress; normal breath sounds with no rales, rhonchi, wheezes or rubs;     CARDIOVASCULAR: normal rate;  rhythm is regular;     GASTROINTESTINAL: nontender; normal bowel sounds; no organomegaly;     SKIN: breast exam remarkable for a mass (left outer in nipple line, mobile, tender, no nipple discharge or skin changes);     MUSCULOSKELETAL: normal gait;     NEUROLOGIC: mental status: alert and oriented x 3; GROSSLY INTACT     PSYCHIATRIC: appropriate affect and demeanor;         Lab/Test Results:         LABORATORY RESULTS: EKG performed by tls         ASSESSMENT           786.59   R07.89  Chest pain, other type              DDx:     Breast lump    611.72   N63.42  Breast lump              DDx:         ORDERS:         Radiology/Test Orders:       [id number]  Electrocardiogram, routine with at least 12 leads; with interpretation and report  (In-House)         46306  Diagnostic mammography computer-aided detcj bi  (Send-Out)         10785  Mammography; bilateral  (Send-Out)           Procedures Ordered:       REFER  Referral to Specialist or Other Facility  (Send-Out)           Other Orders:       [id number]  Breast ultrasound left  (Send-Out)                   PLAN:          Chest pain, other type Nonspecific ST, T wave changes from ECG in 2014. Will refer back to [facility]. Advised to seek ER care immediately for worsening symptoms such as worsening chest pressure, shortness of breath.         TESTS/PROCEDURES:  Will proceed with an ECG to be performed/scheduled now.      REFERRALS:  Referral initiated to a cardiologist ( Dr. David Castillo, [facility] ).      FOLLOW-UP: Schedule follow-up appointments on a p.r.n. basis. Chronic visit follow up           Orders:       [id number]  Electrocardiogram, routine with at least 12 leads; with interpretation and report  (In-House)         REFER  Referral to Specialist or Other Facility  (Send-Out)            Breast lump         RADIOLOGY:  I have ordered Breast Ultrasound Lt Breast ultrasound and a diagnostic mammogram on both breasts Diagnostic Bilateral No  CAD to be done today.      RECOMMENDATIONS given include: Further recommendation to be given after test results are complete.            Orders:       [id number]  Breast ultrasound left  (Send-Out)         77214  Diagnostic mammography computer-aided detcj bi  (Send-Out)         42715  Mammography; bilateral  (Send-Out)               Patient Recommendations:        For  Chest pain, other type:     Schedule follow-up appointments as needed.              CHARGE CAPTURE           **Please note: ICD descriptions below are intended for billing purposes only and may not represent clinical diagnoses**        Primary Diagnosis:         786.59 Chest pain, other type            R07.89    Other chest pain              Orders:          05526   Office/outpatient visit; established patient, level 4  (In-House)             40286   Electrocardiogram, routine with at least 12 leads; with interpretation and report  (In-House)           Breast lump    611.72 Breast lump            N63.42    Unspecified lump in left breast, subareolar        ADDENDUMS:      ____________________________________    Addendum: 09/24/2018 01:29 PM - Erna Issa         Visit Note Faxed to:        David Castillo  (Cardiology); Number [phone]

## 2021-05-18 NOTE — PROGRESS NOTES
Rohith HenleyAndrae 1970     Office/Outpatient Visit    Visit Date: Wed, Jan 17, 2018 04:23 pm    Provider: Frankie Merritt MD (Assistant: Bridgette Sarmiento MA)    Location: [facility]        Electronically signed by Frankie Merritt MD on  01/17/2018 05:49:26 PM                             SUBJECTIVE:        CC:     Ronald is a 47 year old White male.  Patient complains of joint pain all over.          HPI:         Patient to be evaluated for diffuse arthralgia.  The patient notes diffuse joint pain.  This has been a problem for the past several months.  He describes the discomfort as moderate in severity.  Symptoms have been progressive and worsening.  Primary joints affected include: elbows, hips,  right knee, and ankles.  Pertinent medical history is remarkable for IDDM AND HIGH CHOL ON MEDS.  Other details: LITTLE CHANGE WITH NSAIDS     ROS:     CONSTITUTIONAL:  Negative for chills and fever.      CARDIOVASCULAR:  Negative for chest pain and palpitations.      RESPIRATORY:  Positive for POS SLEEP STUDY, HAS NOT YET STARTED USING CPAP.   Negative for recent cough or dyspnea.      GASTROINTESTINAL:  Negative for abdominal pain, nausea and vomiting.      NEUROLOGICAL:  Negative for headaches, paresthesias and weakness.          PMH/FM/SH:     Last Reviewed on 1/17/2018 04:39 PM by Frankie Merritt    Past Medical History:             PAST MEDICAL HISTORY         Hospitalizations: CONCUSSION         CURRENT MEDICAL PROVIDERS:    Pulmonologist    ENDOCRINOLOGIST         Surgical History:         Positive for    Arthroscopy: RIGHT KNEE;;; ; Dr. Turner     Positive for    Treadmill stress test ( 2014--NEG;; );         Family History:         Positive for Hypertension.      Positive for Type 1 Diabetes.          Social History:     Occupation:  FOR [employer]     Marital Status:          Tobacco/Alcohol/Supplements:     Last Reviewed on 1/17/2018 04:38 PM by Frankie Merritt  Silvino    Tobacco: He has never smoked.  Non-drinker     Caffeine:  He admits to consuming caffeine via soda ( 5 servings per day ).              Current Problems:     Last Reviewed on 1/17/2018 04:41 PM by Frankie Merritt    Diffuse arthralgia     Type 1 DM     Obstructive sleep apnea (CPAP pending)     High cholesterol         Immunizations:     None        Allergies:     Last Reviewed on 1/17/2018 04:38 PM by Frankie Merritt      No Known Drug Allergies.         Current Medications:     Last Reviewed on 1/17/2018 04:40 PM by Frankie Merritt    Novolog in pump     Atorvastatin Calcium 80mg Tablet 1 tab daily     Aspirin (ASA) 81mg Tablets, Enteric Coated 1 tab daily     Diclofenac Sodium 75mg Tablets, Enteric Coated 1 tab bid with food         OBJECTIVE:        Vitals:         Current: 1/17/2018 4:26:51 PM    Ht:  6 ft, 0.5 in;  Wt: 231.6 lbs;  BMI: 31.0    T: 97.6 F (oral);  BP: 104/62 mm Hg (left arm, sitting);  P: 93 bpm (left arm (BP Cuff), sitting);  sCr: 1.1 mg/dL;  GFR: 91.49        Exams:     PHYSICAL EXAM:     GENERAL: Vitals recorded well developed, well nourished;  well groomed;  no apparent distress;     NECK: trachea is midline; thyroid is non-palpable;     RESPIRATORY: normal respiratory rate and pattern with no distress; normal breath sounds with no rales, rhonchi, wheezes or rubs;     CARDIOVASCULAR: normal rate; rhythm is regular;  no systolic murmur; no edema;     LYMPHATIC: no enlargement of cervical or facial nodes;     MUSCULOSKELETAL: normal gait; normal overall tone     NEUROLOGIC: GROSSLY INTACT     PSYCHIATRIC:  appropriate affect and demeanor; normal speech pattern; grossly normal memory;         ASSESSMENT           719.49   M25.50  Diffuse arthralgia              DDx:     327.23   G47.33  Obstructive sleep apnea (CPAP pending)              DDx:         ORDERS:         Meds Prescribed:       Cyclobenzaprine HCl 10mg Tablet 1 tablet AT HS PRN  #15 (Fifteen)  tablet(s) Refills: 1         Lab Orders:       [id number]  RAPII - [facility] Arthritis Profile  (Send-Out)         46107  CK - [facility]- CK total  (Send-Out)         18442  MYOGS - [facility] Myoglobin serum  (Send-Out)           Procedures Ordered:       REFER  Referral to Specialist or Other Facility  (Send-Out)                   PLAN:          Diffuse arthralgia     LABORATORY:  Labs ordered to be performed today include Arthritis Profile, CK, total, and Myoglobin.      REFERRALS:  Referral initiated to a rheumatologist ( at [facility], PT PREFERENCE ).      FOLLOW-UP: Schedule follow-up appointments on a p.r.n. basis..      MEDICATIONS: I have prescribed a muscle relaxant.      RECOMMENDATIONS given include: HE WILL DISCUSS WITH HIS ENDOCRINOLOGIST;;.            Prescriptions:       Cyclobenzaprine HCl 10mg Tablet 1 tablet AT HS PRN  #15 (Fifteen) tablet(s) Refills: 1           Orders:       [id number]  RAPII - [facility] Arthritis Profile  (Send-Out)         32024  CK - [facility]- CK total  (Send-Out)         61345  MYOGS - [facility] Myoglobin serum  (Send-Out)         REFER  Referral to Specialist or Other Facility  (Send-Out)            Obstructive sleep apnea (CPAP pending)         ADVISED TO PROCEED WITH STARTING CPAP             Patient Recommendations:        For  Diffuse arthralgia:     I also recommend ^.  Schedule follow-up appointments as needed.                APPOINTMENT INFORMATION:        Monday Tuesday Wednesday Thursday Friday Saturday Sunday            Time:___________________AM  PM   Date:_____________________         For  Obstructive sleep apnea (CPAP pending):     I also recommend ADVISED TO PROCEED WITH STARTING CPAP.              CHARGE CAPTURE           **Please note: ICD descriptions below are intended for billing purposes only and may not represent clinical diagnoses**        Primary Diagnosis:         719.49 Diffuse arthralgia            M25.50    Pain in unspecified joint              Orders:           [id number]   Office/outpatient visit; established patient, level 4  (In-House)           327.23 Obstructive sleep apnea (CPAP pending)            G47.33    Obstructive sleep apnea (adult) (pediatric)

## 2021-05-18 NOTE — PROGRESS NOTES
Rohith Henley CECILIO 1970     Office/Outpatient Visit    Visit Date: Fri, Apr 12, 2019 04:28 pm    Provider: Frankie Merritt MD (Assistant: Betty Byrnes LPN)    Location: [facility]        Electronically signed by Frankie Merritt MD on  04/14/2019 08:44:42 PM                             SUBJECTIVE:        CC:     Ronald is a 48 year old White male.  Endricolongist referral         HPI:         PHQ-9 Depression Screening: Completed form scanned and in chart; Total Score 2 Alcohol Consumption Screening: Completed form scanned and in chart; Total Score 0         Type 1 DM details; specifically, this is type 1 diabetes.  Compliance with treatment has been good.  Current meds include insulin/injectable ( PUMP ).  The hypoglycemic episodes are typically mild.  He does not know results of any recent lab monitoring.  NEEDS NEW ENDOCRINOLOGIST DUE TO INSURANCE CHANGE     ROS:     CONSTITUTIONAL:  Negative for chills and fever.      CARDIOVASCULAR:  Negative for chest pain and palpitations.      RESPIRATORY:  Negative for recent cough and dyspnea.      GASTROINTESTINAL:  Negative for abdominal pain, nausea and vomiting.      MUSCULOSKELETAL:  Positive for arthralgias (MILD, DIFFUSE-MEDS HELP).      INTEGUMENTARY:  Positive for BREAST SORENESS IMPROVED, NEG W/U  AND GEN SURGERY EVAL.      NEUROLOGICAL:  Negative for headaches.          PMH/FM/SH:     Last Reviewed on 4/13/2019 06:04 PM by Frankie Merritt    Past Medical History:             PAST MEDICAL HISTORY         Hospitalizations: CONCUSSION         CURRENT MEDICAL PROVIDERS:    Pulmonologist    ENDOCRINOLOGIST         Surgical History:         Positive for    Arthroscopy: RIGHT KNEE;;; ;     Positive for    Treadmill stress test ( 2014--NEG;; );         Family History:         Positive for Hypertension.      Positive for Type 1 Diabetes.          Social History:     Occupation:  FOR [employer];     Marital Status:           Tobacco/Alcohol/Supplements:     Last Reviewed on 4/13/2019 06:03 PM by Frankie Merritt    Tobacco: He has never smoked.  Non-drinker     Caffeine:  He admits to consuming caffeine via soda ( 5 servings per day ).          Substance Abuse History:     Last Reviewed on 9/24/2018 11:13 AM by Bridgette Sarmiento        Mental Health History:     Last Reviewed on 9/24/2018 11:13 AM by Bridgette Sarmiento        Communicable Diseases (eg STDs):     Last Reviewed on 9/24/2018 11:13 AM by Bridgette Sarmiento            Current Problems:     Last Reviewed on 4/13/2019 06:05 PM by Frankie Merritt    Diffuse arthralgia     Type 1 DM     Obstructive sleep apnea (CPAP pending)     High cholesterol     Screening for depression         Immunizations:     None        Allergies:     Last Reviewed on 4/13/2019 06:03 PM by Frankie Merritt      No Known Drug Allergies.         Current Medications:     Last Reviewed on 4/13/2019 06:05 PM by Frankie Merritt    Cymbalta 30mg Capsules, Delayed Release 1 capsule daily     Novolog in pump     Atorvastatin Calcium 80mg Tablet 1 tab daily     Aspirin (ASA) 81mg Tablets, Enteric Coated 1 tab daily     Mobic 15mg Tablet 1 tab daily         OBJECTIVE:        Vitals:         Current: 4/12/2019 4:33:17 PM    Ht:  6 ft, 0.5 in;  Wt: 255.8 lbs;  BMI: 34.2    T: 98 F (oral);  BP: 124/71 mm Hg (right arm, sitting);  P: 73 bpm (right arm (BP Cuff), sitting);  sCr: 1.1 mg/dL;  GFR: 94.44        Exams:     PHYSICAL EXAM:     GENERAL: Vitals recorded well developed, well nourished;  well groomed;  no apparent distress;     NECK: trachea is midline; thyroid is non-palpable;     RESPIRATORY: normal respiratory rate and pattern with no distress; normal breath sounds with no rales, rhonchi, wheezes or rubs;     CARDIOVASCULAR: normal rate; rhythm is regular;  no systolic murmur; no edema;     LYMPHATIC: no enlargement of cervical or facial nodes;     NEUROLOGIC: GROSSLY INTACT          ASSESSMENT           250.01   E10.9  Type 1 DM              DDx:     V79.0   Z13.89  Screening for depression              DDx:         ORDERS:         Meds Prescribed:       Refill of: Cymbalta (Duloxetine HCl) 30mg Capsules, Delayed Release 1 capsule daily  #90 (Ninety) capsule(s) Refills: 1         Lab Orders:       [id number]  DIAB - [facility] CMP A1C LIPID AND MICRO ALBUM CR RATIO: [id number]  (Send-Out)         APPTO  Appointment need  (In-House)           Procedures Ordered:       REFER  Referral to Specialist or Other Facility  (Send-Out)           Other Orders:         Depression screen negative  (In-House)           Negative EtOH screen  (In-House)                   PLAN:          Type 1 DM     LABORATORY:  Labs ordered to be performed today include Diabetes Panel 2;CMP, A1C, Lipid, Microalbumin:Creatinine Ratio.      REFERRALS:  Referral initiated to an endocrinologist ( Dr. MARISOL CHASE AT [facility] ) and PATIENT PREFERENCE.      FOLLOW-UP: Schedule a follow-up visit in 3 months..            Prescriptions:       Refill of: Cymbalta (Duloxetine HCl) 30mg Capsules, Delayed Release 1 capsule daily  #90 (Ninety) capsule(s) Refills: 1           Orders:       [id number]  DIAB - [facility] CMP A1C LIPID AND MICRO ALBUM CR RATIO: [id number]  (Send-Out)         REFER  Referral to Specialist or Other Facility  (Send-Out)         APPTO  Appointment need  (In-House)            Screening for depression     MIPS Negative Depression Screen Negative alcohol screen           Orders:         Depression screen negative  (In-House)           Negative EtOH screen  (In-House)               Patient Recommendations:        For  Type 1 DM:     Schedule a follow-up visit in 3 months.                APPOINTMENT INFORMATION:        Monday Tuesday Wednesday Thursday Friday Saturday Sunday            Time:___________________AM  PM   Date:_____________________             CHARGE CAPTURE            **Please note: ICD descriptions below are intended for billing purposes only and may not represent clinical diagnoses**        Primary Diagnosis:         250.01 Type 1 DM            E10.9    Type 1 diabetes mellitus without complications              Orders:          [id number]   Office/outpatient visit; established patient, level 4  (In-House)             APPTO   Appointment need  (In-House)           V79.0 Screening for depression            Z13.89    Encounter for screening for other disorder              Orders:             Depression screen negative  (In-House)                Negative EtOH screen  (In-House)

## 2021-05-18 NOTE — PROGRESS NOTES
Rohith HenleyAndrae 1970     Office/Outpatient Visit    Visit Date: Tue, Oct 16, 2018 03:53 pm    Provider: Frankie Merritt MD (Assistant: Jen Solomon LPN)    Location: [facility]        Electronically signed by Frankie Merritt MD on  10/16/2018 09:43:21 PM                             SUBJECTIVE:        CC:     Ronald is a 48 year old White male.  This is a follow-up visit.  Tenderness in left breast 1 1/2 months. Lump found, mammogram and u/s done on 09/28/18.          HPI:         Patient presents with breast mass.  He first noticed it 6 weeks ago.  The mass is characterized as painful, firm, and mobile.  It is located in the retro-areolar area of the left breast.  He has never had a mammogram.  NEG MAMMO AND U/S     ROS:     CONSTITUTIONAL:  Negative for chills and fever.      CARDIOVASCULAR:  Negative for chest pain and palpitations.      RESPIRATORY:  Negative for recent cough and dyspnea.      GASTROINTESTINAL:  Negative for abdominal pain, nausea and vomiting.      MUSCULOSKELETAL:  Negative for myalgias.      NEUROLOGICAL:  Negative for headaches.          PMH/FMH/SH:     Last Reviewed on 10/16/2018 09:34 PM by Frankie Merritt    Past Medical History:             PAST MEDICAL HISTORY         Hospitalizations: CONCUSSION         CURRENT MEDICAL PROVIDERS:    Pulmonologist    ENDOCRINOLOGIST         Surgical History:         Positive for    Arthroscopy: RIGHT KNEE;;; ; Dr. Turner     Positive for    Treadmill stress test ( 2014--NEG;; );         Family History:         Positive for Hypertension.      Positive for Type 1 Diabetes.          Social History:     Occupation:  [employer];     Marital Status:          Tobacco/Alcohol/Supplements:     Last Reviewed on 10/16/2018 09:34 PM by Frankie Merritt    Tobacco: He has never smoked.  Non-drinker     Caffeine:  He admits to consuming caffeine via soda ( 5 servings per day ).          Substance Abuse  History:     Last Reviewed on 9/24/2018 11:13 AM by Bridgette Sarmiento        Mental Health History:     Last Reviewed on 9/24/2018 11:13 AM by Bridgette Sarmiento        Communicable Diseases (eg STDs):     Last Reviewed on 9/24/2018 11:13 AM by Bridgette Sarmiento            Current Problems:     Last Reviewed on 10/16/2018 09:35 PM by Frankie Merritt    Breast mass     Breast lump     Diffuse arthralgia     Type 1 DM     Obstructive sleep apnea (CPAP pending)     Osteoarthritis of knee     High cholesterol     Chest pain, other type         Immunizations:     None        Allergies:     Last Reviewed on 10/16/2018 09:35 PM by Frankie Merritt      No Known Drug Allergies.         Current Medications:     Last Reviewed on 10/16/2018 03:58 PM by Jen Solomon    Diclofenac Sodium 75mg Tablets, Enteric Coated 1 tab bid with food     Novolog in pump     Atorvastatin Calcium 80mg Tablet 1 tab daily     Aspirin (ASA) 81mg Tablets, Enteric Coated 1 tab daily         OBJECTIVE:        Vitals:         Current: 10/16/2018 3:58:13 PM    Ht:  6 ft, 0.5 in;  Wt: 242.8 lbs;  BMI: 32.5    T: 98.1 F (oral);  BP: 115/71 mm Hg (left arm, sitting);  P: 72 bpm (left arm (BP Cuff), sitting);  sCr: 1.1 mg/dL;  GFR: 92.37    O2 Sat: 97 %        Exams:     PHYSICAL EXAM:     GENERAL: Vitals recorded well developed, well nourished;  well groomed;  no apparent distress;     NECK: trachea is midline; thyroid is non-palpable;     RESPIRATORY: normal respiratory rate and pattern with no distress; normal breath sounds with no rales, rhonchi, wheezes or rubs;     CARDIOVASCULAR: normal rate; rhythm is regular;  no systolic murmur; no edema;     LYMPHATIC: no enlargement of cervical or facial nodes; no supraclavicular nodes; no axillary adenopathy;     SKIN: breast exam remarkable for a mass (2 X 2 CM, TENDER);     NEUROLOGIC: GROSSLY INTACT         ASSESSMENT           611.72   N63  Left Breast mass              DDx:         ORDERS:          Procedures Ordered:       REFER  Referral to Specialist or Other Facility  (Send-Out)                   PLAN:         Left Breast mass         REFERRALS:  Referral initiated to a general surgeon ( Dr. Duke Mandel ).            Orders:       REFER  Referral to Specialist or Other Facility  (Send-Out)               CHARGE CAPTURE           **Please note: ICD descriptions below are intended for billing purposes only and may not represent clinical diagnoses**        Primary Diagnosis:         611.72 Left Breast mass            N63    Unspecified lump in breast              Orders:          46145   Office/outpatient visit; established patient, level 3  (In-House)               ADDENDUMS:      ____________________________________    Date: 10/18/2018 02:22 PM    Author: Lucy Mixon         Visit Note Faxed to:        Duke Mandel  (General Surgery); Number [phone]

## 2021-07-01 VITALS
WEIGHT: 240 LBS | HEIGHT: 73 IN | HEART RATE: 74 BPM | BODY MASS INDEX: 31.81 KG/M2 | TEMPERATURE: 98.2 F | SYSTOLIC BLOOD PRESSURE: 132 MMHG | DIASTOLIC BLOOD PRESSURE: 74 MMHG

## 2021-07-01 VITALS
DIASTOLIC BLOOD PRESSURE: 71 MMHG | WEIGHT: 255.8 LBS | BODY MASS INDEX: 33.9 KG/M2 | SYSTOLIC BLOOD PRESSURE: 124 MMHG | TEMPERATURE: 98 F | HEART RATE: 73 BPM | HEIGHT: 73 IN

## 2021-07-01 VITALS
WEIGHT: 231.6 LBS | HEART RATE: 93 BPM | SYSTOLIC BLOOD PRESSURE: 104 MMHG | HEIGHT: 73 IN | DIASTOLIC BLOOD PRESSURE: 62 MMHG | BODY MASS INDEX: 30.69 KG/M2 | TEMPERATURE: 97.6 F

## 2021-07-01 VITALS
BODY MASS INDEX: 32.18 KG/M2 | HEART RATE: 72 BPM | HEIGHT: 73 IN | SYSTOLIC BLOOD PRESSURE: 115 MMHG | DIASTOLIC BLOOD PRESSURE: 71 MMHG | TEMPERATURE: 98.1 F | WEIGHT: 242.8 LBS | OXYGEN SATURATION: 97 %

## 2021-07-01 VITALS
WEIGHT: 249.2 LBS | HEIGHT: 73 IN | BODY MASS INDEX: 33.03 KG/M2 | HEART RATE: 93 BPM | TEMPERATURE: 98 F | SYSTOLIC BLOOD PRESSURE: 131 MMHG | DIASTOLIC BLOOD PRESSURE: 74 MMHG

## 2021-07-01 VITALS
BODY MASS INDEX: 32.39 KG/M2 | DIASTOLIC BLOOD PRESSURE: 74 MMHG | HEIGHT: 73 IN | WEIGHT: 244.4 LBS | SYSTOLIC BLOOD PRESSURE: 122 MMHG | TEMPERATURE: 98.4 F | HEART RATE: 88 BPM

## 2021-07-01 VITALS
TEMPERATURE: 97.6 F | DIASTOLIC BLOOD PRESSURE: 86 MMHG | HEIGHT: 73 IN | WEIGHT: 255.8 LBS | BODY MASS INDEX: 33.9 KG/M2 | HEART RATE: 77 BPM | SYSTOLIC BLOOD PRESSURE: 134 MMHG

## 2021-07-02 VITALS
WEIGHT: 255.8 LBS | DIASTOLIC BLOOD PRESSURE: 74 MMHG | BODY MASS INDEX: 33.9 KG/M2 | SYSTOLIC BLOOD PRESSURE: 127 MMHG | TEMPERATURE: 97.8 F | HEART RATE: 77 BPM | HEIGHT: 73 IN

## 2021-07-02 VITALS
BODY MASS INDEX: 33.58 KG/M2 | OXYGEN SATURATION: 95 % | TEMPERATURE: 98.1 F | SYSTOLIC BLOOD PRESSURE: 133 MMHG | DIASTOLIC BLOOD PRESSURE: 75 MMHG | HEART RATE: 77 BPM | HEIGHT: 73 IN | WEIGHT: 253.4 LBS

## 2021-07-02 VITALS
HEIGHT: 73 IN | DIASTOLIC BLOOD PRESSURE: 75 MMHG | HEART RATE: 74 BPM | TEMPERATURE: 97.1 F | BODY MASS INDEX: 33.5 KG/M2 | WEIGHT: 252.8 LBS | SYSTOLIC BLOOD PRESSURE: 132 MMHG

## 2021-07-09 ENCOUNTER — HOSPITAL ENCOUNTER (OUTPATIENT)
Dept: GENERAL RADIOLOGY | Facility: HOSPITAL | Age: 51
Discharge: HOME | End: 2021-07-09

## 2021-07-09 ENCOUNTER — LAB (OUTPATIENT)
Dept: LAB | Facility: HOSPITAL | Age: 51
End: 2021-07-09

## 2021-07-09 ENCOUNTER — OFFICE VISIT (OUTPATIENT)
Dept: FAMILY MEDICINE CLINIC | Age: 51
End: 2021-07-09

## 2021-07-09 VITALS
DIASTOLIC BLOOD PRESSURE: 80 MMHG | BODY MASS INDEX: 33.64 KG/M2 | HEART RATE: 72 BPM | SYSTOLIC BLOOD PRESSURE: 119 MMHG | WEIGHT: 253.8 LBS | HEIGHT: 73 IN

## 2021-07-09 DIAGNOSIS — E10.9 TYPE 1 DIABETES MELLITUS WITHOUT COMPLICATION (HCC): ICD-10-CM

## 2021-07-09 DIAGNOSIS — R07.89 OTHER CHEST PAIN: Primary | ICD-10-CM

## 2021-07-09 DIAGNOSIS — E78.5 HYPERLIPIDEMIA, UNSPECIFIED HYPERLIPIDEMIA TYPE: ICD-10-CM

## 2021-07-09 DIAGNOSIS — M25.512 ACUTE PAIN OF LEFT SHOULDER: ICD-10-CM

## 2021-07-09 DIAGNOSIS — R07.89 OTHER CHEST PAIN: ICD-10-CM

## 2021-07-09 LAB
ALBUMIN SERPL-MCNC: 4.3 G/DL (ref 3.5–5.2)
ALBUMIN/GLOB SERPL: 1.7 G/DL
ALP SERPL-CCNC: 67 U/L (ref 39–117)
ALT SERPL W P-5'-P-CCNC: 23 U/L (ref 1–41)
ANION GAP SERPL CALCULATED.3IONS-SCNC: 7.8 MMOL/L (ref 5–15)
AST SERPL-CCNC: 21 U/L (ref 1–40)
BILIRUB SERPL-MCNC: 0.6 MG/DL (ref 0–1.2)
BUN SERPL-MCNC: 15 MG/DL (ref 6–20)
BUN/CREAT SERPL: 12.7 (ref 7–25)
CALCIUM SPEC-SCNC: 9.3 MG/DL (ref 8.6–10.5)
CHLORIDE SERPL-SCNC: 103 MMOL/L (ref 98–107)
CHOLEST SERPL-MCNC: 186 MG/DL (ref 0–200)
CO2 SERPL-SCNC: 26.2 MMOL/L (ref 22–29)
CREAT SERPL-MCNC: 1.18 MG/DL (ref 0.76–1.27)
DEPRECATED RDW RBC AUTO: 41.7 FL (ref 37–54)
ERYTHROCYTE [DISTWIDTH] IN BLOOD BY AUTOMATED COUNT: 13.1 % (ref 12.3–15.4)
GFR SERPL CREATININE-BSD FRML MDRD: 65 ML/MIN/1.73
GLOBULIN UR ELPH-MCNC: 2.6 GM/DL
GLUCOSE SERPL-MCNC: 130 MG/DL (ref 65–99)
HBA1C MFR BLD: 8.2 % (ref 4.8–5.6)
HCT VFR BLD AUTO: 45.2 % (ref 37.5–51)
HDLC SERPL-MCNC: 50 MG/DL (ref 40–60)
HGB BLD-MCNC: 14.9 G/DL (ref 13–17.7)
LDLC SERPL CALC-MCNC: 125 MG/DL (ref 0–100)
LDLC/HDLC SERPL: 2.48 {RATIO}
MCH RBC QN AUTO: 28.3 PG (ref 26.6–33)
MCHC RBC AUTO-ENTMCNC: 33 G/DL (ref 31.5–35.7)
MCV RBC AUTO: 85.9 FL (ref 79–97)
PLATELET # BLD AUTO: 191 10*3/MM3 (ref 140–450)
PMV BLD AUTO: 10.6 FL (ref 6–12)
POTASSIUM SERPL-SCNC: 4.4 MMOL/L (ref 3.5–5.2)
PROT SERPL-MCNC: 6.9 G/DL (ref 6–8.5)
RBC # BLD AUTO: 5.26 10*6/MM3 (ref 4.14–5.8)
SODIUM SERPL-SCNC: 137 MMOL/L (ref 136–145)
TRIGL SERPL-MCNC: 59 MG/DL (ref 0–150)
VLDLC SERPL-MCNC: 11 MG/DL (ref 5–40)
WBC # BLD AUTO: 5.17 10*3/MM3 (ref 3.4–10.8)

## 2021-07-09 PROCEDURE — 73030 X-RAY EXAM OF SHOULDER: CPT

## 2021-07-09 PROCEDURE — 93000 ELECTROCARDIOGRAM COMPLETE: CPT | Performed by: FAMILY MEDICINE

## 2021-07-09 PROCEDURE — 99214 OFFICE O/P EST MOD 30 MIN: CPT | Performed by: NURSE PRACTITIONER

## 2021-07-09 PROCEDURE — 85027 COMPLETE CBC AUTOMATED: CPT

## 2021-07-09 PROCEDURE — 36415 COLL VENOUS BLD VENIPUNCTURE: CPT

## 2021-07-09 PROCEDURE — 83036 HEMOGLOBIN GLYCOSYLATED A1C: CPT

## 2021-07-09 PROCEDURE — 80053 COMPREHEN METABOLIC PANEL: CPT

## 2021-07-09 PROCEDURE — 80061 LIPID PANEL: CPT

## 2021-07-09 RX ORDER — MELOXICAM 15 MG/1
15 TABLET ORAL DAILY
Qty: 30 TABLET | Refills: 0 | Status: SHIPPED | OUTPATIENT
Start: 2021-07-09 | End: 2021-08-08

## 2021-07-09 NOTE — PATIENT INSTRUCTIONS
Nonspecific Chest Pain, Adult  Chest pain can be caused by many different conditions. It can be caused by a condition that is life-threatening and requires treatment right away. It can also be caused by something that is not life-threatening. If you have chest pain, it can be hard to know the difference, so it is important to get help right away to make sure that you do not have a serious condition.  Some life-threatening causes of chest pain include:  · Heart attack.  · A tear in the body's main blood vessel (aortic dissection).  · Inflammation around your heart (pericarditis).  · A problem in the lungs, such as a blood clot (pulmonary embolism) or a collapsed lung (pneumothorax).  Some non life-threatening causes of chest pain include:  · Heartburn.  · Anxiety or stress.  · Damage to the bones, muscles, and cartilage that make up your chest wall.  · Pneumonia or bronchitis.  · Shingles infection (varicella-zoster virus).  Chest pain can feel like:  · Pain or discomfort on the surface of your chest or deep in your chest.  · Crushing, pressure, aching, or squeezing pain.  · Burning or tingling.  · Dull or sharp pain that is worse when you move, cough, or take a deep breath.  · Pain or discomfort that is also felt in your back, neck, jaw, shoulder, or arm, or pain that spreads to any of these areas.  Your chest pain may come and go. It may also be constant. Your health care provider will do lab tests and other studies to find the cause of your pain. Treatment will depend on the cause of your chest pain.  Follow these instructions at home:  Medicines  · Take over-the-counter and prescription medicines only as told by your health care provider.  · If you were prescribed an antibiotic, take it as told by your health care provider. Do not stop taking the antibiotic even if you start to feel better.  Lifestyle    · Rest as directed by your health care provider.  · Do not use any products that contain nicotine or tobacco,  such as cigarettes and e-cigarettes. If you need help quitting, ask your health care provider.  · Do not drink alcohol.  · Make healthy lifestyle choices as recommended. These may include:  ? Getting regular exercise. Ask your health care provider to suggest some activities that are safe for you.  ? Eating a heart-healthy diet. This includes plenty of fresh fruits and vegetables, whole grains, low-fat (lean) protein, and low-fat dairy products. A dietitian can help you find healthy eating options.  ? Maintaining a healthy weight.  ? Managing any other health conditions you have, such as high blood pressure (hypertension) or diabetes.  ? Reducing stress, such as with yoga or relaxation techniques.  General instructions  · Pay attention to any changes in your symptoms. Tell your health care provider about them or any new symptoms.  · Avoid any activities that cause chest pain.  · Keep all follow-up visits as told by your health care provider. This is important. This includes visits for any further testing if your chest pain does not go away.  Contact a health care provider if:  · Your chest pain does not go away.  · You feel depressed.  · You have a fever.  Get help right away if:  · Your chest pain gets worse.  · You have a cough that gets worse, or you cough up blood.  · You have severe pain in your abdomen.  · You faint.  · You have sudden, unexplained chest discomfort.  · You have sudden, unexplained discomfort in your arms, back, neck, or jaw.  · You have shortness of breath at any time.  · You suddenly start to sweat, or your skin gets clammy.  · You feel nausea or you vomit.  · You suddenly feel lightheaded or dizzy.  · You have severe weakness, or unexplained weakness or fatigue.  · Your heart begins to beat quickly, or it feels like it is skipping beats.  These symptoms may represent a serious problem that is an emergency. Do not wait to see if the symptoms will go away. Get medical help right away. Call your  local emergency services (911 in the [place] Do not drive yourself to the hospital.  Summary  · Chest pain can be caused by a condition that is serious and requires urgent treatment. It may also be caused by something that is not life-threatening.  · If you have chest pain, it is very important to see your health care provider. Your health care provider may do lab tests and other studies to find the cause of your pain.  · Follow your health care provider's instructions on taking medicines, making lifestyle changes, and getting emergency treatment if symptoms become worse.  · Keep all follow-up visits as told by your health care provider. This includes visits for any further testing if your chest pain does not go away.  This information is not intended to replace advice given to you by your health care provider. Make sure you discuss any questions you have with your health care provider.  Document Revised: 06/20/2019 Document Reviewed: 06/20/2019  [employer] Patient Education © 2021 [employer]

## 2021-07-09 NOTE — PROGRESS NOTES
"Chief Complaint  Shoulder Pain (left) and Chest Pain (left side, dull pain)    Subjective          Rohith Henley presents to [facility]  He comes in today with complaints of left chest pain times couple weeks.  Is a constant dullness.  Tends to be aggravated with movement, especially side to side movement.  He denies dyspnea, feeling flushed, and sweating.  He does have some shortness of air with activity.  He denies chest pain with exertion.  There is no known family history of heart disease.  The patient is type I diabetic and is currently taking Lipitor 80 mg daily.  He has had some coughing due to allergies, but is not coughing frequently.  There has been no known injury or overuse.  He also has been having left shoulder pain for the past couple months.  He feels the shoulder pain and chest pain are not related.  His shoulder pain is worse with laying and abduction of his shoulder joint.  When asked to describe the pain, he states that it feels like it is in the joint and deep.  If you palpate his left shoulder deeply, it is tender.  He has taken Voltaren in the past, which did help.      Objective   Vital Signs:   /80 (BP Location: Right arm, Patient Position: Sitting)   Pulse 72   Ht 185.4 cm (73\")   Wt 115 kg (253 lb 12.8 oz)   BMI 33.48 kg/m²     Physical Exam  Constitutional:       General: He is not in acute distress.     Appearance: Normal appearance.   HENT:      Head: Normocephalic.   Eyes:      Pupils: Pupils are equal, round, and reactive to light.      Visual Fields: Right eye visual fields normal and left eye visual fields normal.   Neck:      Trachea: Trachea normal.   Cardiovascular:      Rate and Rhythm: Normal rate and regular rhythm.      Heart sounds: Normal heart sounds.   Pulmonary:      Effort: Pulmonary effort is normal. No respiratory distress.      Breath sounds: Normal breath sounds and air entry.   Chest:      Chest wall: No tenderness. "   Musculoskeletal:         General: Tenderness (Left shoulder with deep palpation) present.      Right lower leg: No edema.      Left lower leg: No edema.   Neurological:      Mental Status: He is alert and oriented to person, place, and time.   Psychiatric:         Mood and Affect: Mood and affect normal.         Behavior: Behavior normal.         Thought Content: Thought content normal.        Result Review :                 Assessment and Plan    Diagnoses and all orders for this visit:    1. Other chest pain (Primary)  -     ECG 12 Lead  -     Lipid Panel; Future  -     Ambulatory Referral to Cardiology    2. Acute pain of left shoulder  -     ECG 12 Lead  -     Ambulatory Referral to Physical Therapy Evaluate and treat  -     XR Shoulder 2+ View Left; Future  -     meloxicam (Mobic) 15 MG tablet; Take 1 tablet by mouth Daily for 30 days.  Dispense: 30 tablet; Refill: 0    3. Type 1 diabetes mellitus without complication (CMS/[facility])  -     Lipid Panel; Future  -     CBC No Differential; Future  -     Comprehensive Metabolic Panel; Future  -     Hemoglobin A1c; Future  -     Ambulatory Referral to Cardiology    4. Hyperlipidemia, unspecified hyperlipidemia type  -     Lipid Panel; Future  -     Ambulatory Referral to Cardiology    I am going to refer him to cardiology due to his history of chest pain and history of diabetes.  I am also going to order CBC, CMP, lipid panel, and hemoglobin A1c.  I recommended that he follow-up with his primary care provider.  I have sent in a prescription of meloxicam 15 mg daily.  I have recommended that he not use NSAIDs at the same time of the medication.  I am also going to refer him to physical therapy for his shoulder.  I am also going to order an x-ray of his left shoulder.  If his symptoms do not prove, will consider an MRI.  If his chest pain continues, he has been advised to go to the ER.    Follow Up   Return if symptoms worsen or fail to improve.  Patient was given  instructions and counseling regarding his condition or for health maintenance advice. Please see specific information pulled into the AVS if appropriate.

## 2021-07-09 NOTE — PROGRESS NOTES
[facility]  EKG Interpretation  Rohith Henley presents to [facility]  Encounter Date: 07/09/2021         Procedures:    ECG 12 Lead    Date/Time: 7/9/2021 12:54 PM  Performed by: Min Padilla MD  Authorized by: Anh Coker APRN   Comparison: not compared with previous ECG   Previous ECG: no previous ECG available  Rhythm: sinus rhythm  Rate: normal  Conduction: non-specific intraventricular conduction delay  ST Segments: ST segments normal  T Waves: T waves normal  QRS axis: normal  Other: no other findings    Clinical impression: normal ECG

## 2021-08-03 ENCOUNTER — TREATMENT (OUTPATIENT)
Dept: PHYSICAL THERAPY | Facility: CLINIC | Age: 51
End: 2021-08-03

## 2021-08-03 DIAGNOSIS — M25.512 ACUTE PAIN OF LEFT SHOULDER: Primary | ICD-10-CM

## 2021-08-03 PROCEDURE — 97161 PT EVAL LOW COMPLEX 20 MIN: CPT | Performed by: PHYSICAL THERAPIST

## 2021-08-03 NOTE — PROGRESS NOTES
Physical Therapy Initial Evaluation and Plan of Care      Patient: Rohith Henley   : 1970  Diagnosis/ICD-10 Code:  Acute pain of left shoulder [M25.512]  Referring practitioner: Anh Brown*  Date of Initial Visit: 8/3/2021  Today's Date: 8/3/2021  Patient seen for 1 sessions           Subjective Evaluation    History of Present Illness  Mechanism of injury: Pt states that he is getting a lot of pain in his L shoulder.  When he lifts his arm up to the side, he gets pain.  If he goes forwards, it will not get pain.  Popping is consistent everytime he moves arm up to the side.  He sleeps with L arm overhead. No injury that he can recall.  This has been going on 2-3 months, pain is about the same.  He takes medication for joint pain. He had an x-ray, results were negative.      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  Progression: no change    Hand dominance: left    Diagnostic Tests  X-ray: normal    Patient Goals  Patient goals for therapy: decreased pain, increased strength, increased motion and independence with ADLs/IADLs             Objective          Palpation     Additional Palpation Details  Tender at AC joint L, upper trap L    Active Range of Motion   Left Shoulder   Flexion: 180 degrees   Extension: 55 degrees   Abduction: 121 degrees with pain  External rotation 0°: 65 degrees   Internal rotation 0°: 70 degrees with pain    Strength/Myotome Testing     Left Shoulder     Planes of Motion   Flexion: 3+   Extension: 4-   Abduction: 3   External rotation at 0°: 4-   Internal rotation at 0°: 3+           Assessment & Plan     Assessment  Impairments: abnormal or restricted ROM, impaired physical strength and pain with function  Assessment details: Pt presents with  limitations, noted below, that impede his ability to open jars, wash his back, perform household heavy chores, sleeping, and recreational activities . The skills of a therapist will be required to safely and effectively implement the following treatment plan to restore maximal level of function.    Functional Limitations: carrying objects, lifting, uncomfortable because of pain and reaching behind back  Goals  Plan Goals: SHOULDER  PROBLEMS:     1. The patient has limited ROM of the left shoulder.    LTG 1: 12 weeks:  The patient will demonstrate 180 degrees of shoulder abduction, 80 degrees of shoulder external rotation, and 80 degrees of shoulder internal rotation to allow the patient to reach into upper kitchen cabinets and manipulate clothing behind the back with greater ease.    STATUS:  New   STG 1a: 4 weeks:  The patient will demonstrate 140 degrees of shoulder abduction, 70 degrees of shoulder external rotation, and 75 degrees of shoulder internal rotation.    STATUS:  New   TREATMENT: Manual therapy, therapeutic exercise, home exercise instruction, and modalities as needed to include: electrical stimulation, ultrasound, moist heat, and ice.    2. The patient has limited strength of the left shoulder.   LTG 2: 12 weeks:  The patient will demonstrate 5 /5 strength for left shoulder flexion, abduction, external rotation, and internal rotation in order to demonstrate improved shoulder stability.    STATUS:  New   STG 2a: 4 weeks:  The patient will demonstrate 4+ /5 strength for left shoulder flexion, abduction, external rotation, and internal rotation.    STATUS:  New   STG2b:  4 weeks:  The patient will be independent with home exercises.     STATUS:  New   TREATMENT: Manual therapy, therapeutic exercise, home exercise instruction, and modalities as needed to include: electrical stimulation, ultrasound, moist heat, and ice.     3. The patient complains of pain to the left shoulder.   LTG 3: 12 weeks:  The  patient will report a pain rating of 2 /10 or better in order to improve sleep quality and tolerance to performance of activities of daily living.    STATUS:  New   STG 3a: 4 weeks:  The patient will report a pain rating of 4 /10 or better.     STATUS:  New   TREATMENT: Manual therapy, therapeutic exercise, home exercise instruction, and modalities as needed to include: electrical stimulation, ultrasound, moist heat, and ice.    Plan  Therapy options: will be seen for skilled physical therapy services  Planned modality interventions: cryotherapy, thermotherapy (hydrocollator packs), TENS, ultrasound and dry needling  Planned therapy interventions: manual therapy, neuromuscular re-education, flexibility, functional ROM exercises, home exercise program, soft tissue mobilization, strengthening, stretching, therapeutic activities and joint mobilization  Duration in weeks: 12  Treatment plan discussed with: patient  Plan details: Progress with shoulder and scapular stability, increase shoulder abduction ROM as tolerated, modalities as needed.        Visit Diagnoses:    ICD-10-CM ICD-9-CM   1. Acute pain of left shoulder  M25.512 719.41       Timed:  Manual Therapy:         mins  60330;  Therapeutic Exercise:         mins  55192;     Neuromuscular Mamie:        mins  95190;    Therapeutic Activity:          mins  44197;     Gait Training:           mins  48366;     Ultrasound:          mins  75162;        Untimed:  Electrical Stimulation:         mins  49753 ( );  Mechanical Traction:         mins  10092;   Dry Needling:                      mins self pay    Timed Treatment:      mins   Total Treatment:     33   mins    PT SIGNATURE: Emy Cordova PT, DPT  Physical Therapist        Initial Certification  Certification Period: 8/3/2021 thru 11/1/2021  I certify that the therapy services are furnished while this patient is under my care.  The services outlined above are required by this patient, and will be reviewed  every 90 days.     PHYSICIAN: Anh Coker, APRN      DATE:     Please sign and return via fax to [phone].. Thank you, [facility].

## 2021-08-04 ENCOUNTER — TELEPHONE (OUTPATIENT)
Dept: PHYSICAL THERAPY | Facility: CLINIC | Age: 51
End: 2021-08-04

## 2021-08-04 NOTE — TELEPHONE ENCOUNTER
PATIENT'S COWORKER TESTED POSITIVE FOR COVID- WILL CANCEL APPT. TODAY UNTIL TESTED BEFORE 8/10 APPT.

## 2021-08-10 ENCOUNTER — TELEPHONE (OUTPATIENT)
Dept: PHYSICAL THERAPY | Facility: CLINIC | Age: 51
End: 2021-08-10

## 2021-08-16 ENCOUNTER — TREATMENT (OUTPATIENT)
Dept: PHYSICAL THERAPY | Facility: CLINIC | Age: 51
End: 2021-08-16

## 2021-08-16 DIAGNOSIS — M25.512 ACUTE PAIN OF LEFT SHOULDER: Primary | ICD-10-CM

## 2021-08-16 PROCEDURE — 97110 THERAPEUTIC EXERCISES: CPT | Performed by: PHYSICAL THERAPIST

## 2021-08-16 NOTE — PROGRESS NOTES
Physical Therapy Daily Progress Note      Patient: Rohith Henley   : 1970  Referring practitioner: Anh Brown*  Date of Initial Visit: Type: THERAPY  Noted: 8/3/2021  Today's Date: 2021  Patient seen for 2 sessions           Rohith Henley reports: L shoulder soreness        Subjective Evaluation    History of Present Illness    Subjective comment: States L shoulder sore from last night, was suporting self and slipped, jarring L shoulder. States has soreness with popping with AROM.        Objective           General Comments     Shoulder Comments   AROM: L shoulder:  Flexion aprox 110, abd aprox 100, IR to beltline with c/o pain at end range, ER to ear, c/o pain to go further, compensation noted.     L scapular winging noted.      See Exercise, Manual, and Modality Logs for complete treatment.       Assessment & Plan     Assessment  Assessment details: Impingement, poor scapular positioning, weakenss with stabilization.     Performed exercises with VC and MC for scapular retraction and technique.     Given WHEP for scapular retraction and rowing.   Prognosis details: .     Goals  Plan Goals: Goals: SHOULDER  PROBLEMS:     1. The patient has limited ROM of the left shoulder.                LTG 1: 12 weeks:  The patient will demonstrate 180 degrees of shoulder abduction, 80 degrees of shoulder external rotation, and 80 degrees of shoulder internal rotation to allow the patient to reach into upper kitchen cabinets and manipulate clothing behind the back with greater ease.                          STATUS:  New              STG 1a: 4 weeks:  The patient will demonstrate 140 degrees of shoulder abduction, 70 degrees of shoulder external rotation, and 75 degrees of shoulder internal rotation.                          STATUS:  New              TREATMENT: Manual therapy, therapeutic exercise, home exercise instruction, and modalities as needed to include: electrical stimulation, ultrasound,  moist heat, and ice.    2. The patient has limited strength of the left shoulder.              LTG 2: 12 weeks:  The patient will demonstrate 5 /5 strength for left shoulder flexion, abduction, external rotation, and internal rotation in order to demonstrate improved shoulder stability.                          STATUS:  New              STG 2a: 4 weeks:  The patient will demonstrate 4+ /5 strength for left shoulder flexion, abduction, external rotation, and internal rotation.                          STATUS:  New              STG2b:  4 weeks:  The patient will be independent with home exercises.                           STATUS:  New              TREATMENT: Manual therapy, therapeutic exercise, home exercise instruction, and modalities as needed to include: electrical stimulation, ultrasound, moist heat, and ice.                3. The patient complains of pain to the left shoulder.              LTG 3: 12 weeks:  The patient will report a pain rating of 2 /10 or better in order to improve sleep quality and tolerance to performance of activities of daily living.                          STATUS:  New              STG 3a: 4 weeks:  The patient will report a pain rating of 4 /10 or better.                           STATUS:  New              TREATMENT: Manual therapy, therapeutic exercise, home exercise instruction, and modalities as needed to include: electrical stimulation, ultrasound, moist heat, and ice.    Plan  Plan details: Continue therapy as planned, progress scapular stabilization , education , postural education, HEP instruction        Visit Diagnoses:    ICD-10-CM ICD-9-CM   1. Acute pain of left shoulder  M25.512 719.41       Progress per Plan of Care and Progress strengthening /stabilization /functional activity           Timed:  Manual Therapy:         mins  38031;  Therapeutic Exercise:    25    mins  56073;     Neuromuscular Mamie:        mins  22166;    Therapeutic Activity:          mins  74155;     Gait  Training:           mins  63596;     Ultrasound:          mins  53707;    Paraffin:        mins  14326;  Electrical Stimulation:         mins  17333 ( );    Untimed:  Electrical Stimulation:         mins  04697 ( );  Mechanical Traction:        mins  22195;     Timed Treatment:   25   mins   Total Treatment:     30   mins    Caro Cheney, PT,   Physical Therapist        This document has been electronically signed by Caro Cheney PT on August 16, 2021 13:22 EDT

## 2021-08-19 ENCOUNTER — TREATMENT (OUTPATIENT)
Dept: PHYSICAL THERAPY | Facility: CLINIC | Age: 51
End: 2021-08-19

## 2021-08-19 DIAGNOSIS — M25.512 ACUTE PAIN OF LEFT SHOULDER: Primary | ICD-10-CM

## 2021-08-19 PROCEDURE — 97035 APP MDLTY 1+ULTRASOUND EA 15: CPT | Performed by: PHYSICAL THERAPIST

## 2021-08-19 PROCEDURE — 97140 MANUAL THERAPY 1/> REGIONS: CPT | Performed by: PHYSICAL THERAPIST

## 2021-08-19 NOTE — PROGRESS NOTES
Physical Therapy Daily Treatment Note      Patient: Rohith Henley   : 1970  Referring practitioner: Anh Brown*  Date of Initial Visit: Type: THERAPY  Noted: 8/3/2021  Today's Date: 2021  Patient seen for 3 sessions           Subjective 5/10    Objective   See Exercise, Manual, and Modality Logs for complete treatment.       Assessment/Plan Pt has tightness in the deltoid muscle that is palpable and tender.  MFR was given to the area and US was given at the end for increased healing. Cont with the POC to progress pt toward goals.      Visit Diagnoses:    ICD-10-CM ICD-9-CM   1. Acute pain of left shoulder  M25.512 719.41       Progress per Plan of Care    Timed:    Therapeutic Exercise:         mins  50744;  Manual Therapy:    17     mins  46911;     Neuromuscular Mamie:       mins  80657;    Therapeutic Activity:          mins  58328;     Gait Training:           mins  66895;     Ultrasound:     8     mins  40125;      Untimed:  Electrical Stimulation:         mins  10674 ( );  Mechanical Traction:         mins  44004;     Timed Treatment:   25   mins   Total Treatment:     28   mins      Leigha Diallo PTA  Physical Therapist Assistant

## 2021-08-24 ENCOUNTER — TREATMENT (OUTPATIENT)
Dept: PHYSICAL THERAPY | Facility: CLINIC | Age: 51
End: 2021-08-24

## 2021-08-24 DIAGNOSIS — M25.512 ACUTE PAIN OF LEFT SHOULDER: Primary | ICD-10-CM

## 2021-08-24 PROCEDURE — 97140 MANUAL THERAPY 1/> REGIONS: CPT | Performed by: PHYSICAL THERAPIST

## 2021-08-26 ENCOUNTER — TREATMENT (OUTPATIENT)
Dept: PHYSICAL THERAPY | Facility: CLINIC | Age: 51
End: 2021-08-26

## 2021-08-26 DIAGNOSIS — M25.512 LEFT SHOULDER PAIN, UNSPECIFIED CHRONICITY: Primary | ICD-10-CM

## 2021-08-26 PROCEDURE — 97110 THERAPEUTIC EXERCISES: CPT | Performed by: PHYSICAL THERAPIST

## 2021-08-26 PROCEDURE — 97035 APP MDLTY 1+ULTRASOUND EA 15: CPT | Performed by: PHYSICAL THERAPIST

## 2021-08-26 NOTE — PROGRESS NOTES
Physical Therapy Daily Treatment Note      Patient: Rohith Henley   : 1970  Referring practitioner: Anh Brown*  Date of Initial Visit: Type: THERAPY  Noted: 8/3/2021  Today's Date: 2021  Patient seen for 5 sessions           Subjective 2/10    Objective   See Exercise, Manual, and Modality Logs for complete treatment.       Assessment/Plan Pt had pain and discomfort in the posterior deltoid at this tx visit. Pt has soft tissue tightness in the same area.   Pt started new exercises requiring vc and demonstration for safe and effective performance. Cont with the POC to progress pt toward goals.      Visit Diagnoses:    ICD-10-CM ICD-9-CM   1. Left shoulder pain, unspecified chronicity  M25.512 719.41       Progress per Plan of Care    Timed:    Therapeutic Exercise:    14     mins  00802;  Manual Therapy:    3     mins  41602;     Neuromuscular Mamie:       mins  96610;    Therapeutic Activity:          mins  04442;     Gait Training:           mins  26662;     Ultrasound:     8     mins  58080;      Untimed:  Electrical Stimulation:         mins  79858 ( );  Mechanical Traction:         mins  80225;     Timed Treatment:   25   mins   Total Treatment:     30   mins      Leigha Diallo PTA  Physical Therapist Assistant

## 2021-09-02 ENCOUNTER — TREATMENT (OUTPATIENT)
Dept: PHYSICAL THERAPY | Facility: CLINIC | Age: 51
End: 2021-09-02

## 2021-09-02 DIAGNOSIS — M25.512 LEFT SHOULDER PAIN, UNSPECIFIED CHRONICITY: Primary | ICD-10-CM

## 2021-09-02 DIAGNOSIS — M25.512 ACUTE PAIN OF LEFT SHOULDER: ICD-10-CM

## 2021-09-02 PROCEDURE — 97110 THERAPEUTIC EXERCISES: CPT | Performed by: PHYSICAL THERAPIST

## 2021-09-02 NOTE — PROGRESS NOTES
Re-Assessment / Re-Certification        Patient: Rohith Henley   : 1970  Diagnosis/ICD-10 Code:  Left shoulder pain, unspecified chronicity [M25.512]  Referring practitioner: Anh Brown*  Date of Initial Visit: Type: THERAPY  Noted: 8/3/2021  Today's Date: 2021  Patient seen for 6 sessions      Subjective:       Clinical Progress: IMPROVED  Home Program Compliance: YES  Treatment has included: therapeutic exercise, manual therapy and ultrasound    Subjective Evaluation    History of Present Illness    Subjective comment: 3/10 pain in the L shoulder. 70% improvement since starting PT.  He has difficulty holding things by his side, ex ice bag. Manual and ultrasound were helpful last visit to improve tightness.     Objective          Palpation     Additional Palpation Details  Tender at AC joint L, upper trap L    Active Range of Motion   Left Shoulder   Flexion: 180 degrees   Extension: 60 degrees   Abduction: 180 degrees with pain  External rotation 0°: 78 degrees   Internal rotation 0°: 80 degrees     Strength/Myotome Testing     Left Shoulder     Planes of Motion   Flexion: 4-   Extension: 4   Abduction: 4-   External rotation at 0°: 4   Internal rotation at 0°: 4       Assessment & Plan     Assessment  Impairments: abnormal or restricted ROM, impaired physical strength and pain with function  Assessment details: Pain, ROM, and strength have all improved for the L shoulder. He has almost full ROM in all directions with the exception of external rotation. He continues to have some difficulty and fatigue/low endurance with certain activities, but will continue to progress and benefit from further PT services to address those deficits.     Functional Limitations: carrying objects, lifting, uncomfortable because of pain and reaching behind back  Goals  Plan Goals: SHOULDER  PROBLEMS:     1. The patient has limited ROM of the left shoulder.    LTG 1: 12 weeks:  The patient will demonstrate 180  degrees of shoulder abduction, 80 degrees of shoulder external rotation, and 80 degrees of shoulder internal rotation to allow the patient to reach into upper kitchen cabinets and manipulate clothing behind the back with greater ease.    STATUS:  Met all but external rotation   STG 1a: 4 weeks:  The patient will demonstrate 140 degrees of shoulder abduction, 70 degrees of shoulder external rotation, and 75 degrees of shoulder internal rotation.    STATUS:   Met all but external rotation     TREATMENT: Manual therapy, therapeutic exercise, home exercise instruction, and modalities as needed to include: electrical stimulation, ultrasound, moist heat, and ice.    2. The patient has limited strength of the left shoulder.   LTG 2: 12 weeks:  The patient will demonstrate 5 /5 strength for left shoulder flexion, abduction, external rotation, and internal rotation in order to demonstrate improved shoulder stability.    STATUS:  Progressing   STG 2a: 4 weeks:  The patient will demonstrate 4+ /5 strength for left shoulder flexion, abduction, external rotation, and internal rotation.    STATUS:  Progressing   STG2b:  4 weeks:  The patient will be independent with home exercises.     STATUS:  Met; tailor as needed   TREATMENT: Manual therapy, therapeutic exercise, home exercise instruction, and modalities as needed to include: electrical stimulation, ultrasound, moist heat, and ice.     3. The patient complains of pain to the left shoulder.   LTG 3: 12 weeks:  The patient will report a pain rating of 2 /10 or better in order to improve sleep quality and tolerance to performance of activities of daily living.    STATUS:  Progressing   STG 3a: 4 weeks:  The patient will report a pain rating of 4 /10 or better.     STATUS:  Met   TREATMENT: Manual therapy, therapeutic exercise, home exercise instruction, and modalities as needed to include: electrical stimulation, ultrasound, moist heat, and ice.    Plan  Therapy options: will be  seen for skilled physical therapy services  Planned modality interventions: cryotherapy, thermotherapy (hydrocollator packs), TENS, ultrasound and dry needling  Planned therapy interventions: manual therapy, neuromuscular re-education, flexibility, functional ROM exercises, home exercise program, soft tissue mobilization, strengthening, stretching, therapeutic activities and joint mobilization  Duration in weeks: 12  Treatment plan discussed with: patient  Plan details: Progress with shoulder and scapular stability, improve tightness in the shoulder muscles/joint, modalities as needed.        Visit Diagnoses:    ICD-10-CM ICD-9-CM   1. Left shoulder pain, unspecified chronicity  M25.512 719.41   2. Acute pain of left shoulder  M25.512 719.41       Progress toward previous goals: Partially Met      Recommendations: Continue as planned  Timeframe: 1 month  Prognosis to achieve goals: good    PT Signature: Emy Cordova, PT, DPT  Physical Therapist      Based upon review of the patient's progress and continued therapy plan, it is my medical opinion that Rohith Henley should continue physical therapy treatment at [facility]  [address] [place] [id number] [phone].    Signature: __________________________________  Anh Coker APRN    Timed:  Manual Therapy:    5     mins  73405;  Therapeutic Exercise:    19     mins  11245;     Neuromuscular Mamie:        mins  82521;    Therapeutic Activity:          mins  60912;     Gait Training:           mins  61818;     Ultrasound:          mins  05980;        Untimed:  Electrical Stimulation:         mins  80403 ( );  Mechanical Traction:         mins  63367;   Dry Needling:                      mins self pay    _______________________________________    Timed Treatment:   24   mins   Total Treatment:     30   mins

## 2021-09-07 ENCOUNTER — TREATMENT (OUTPATIENT)
Dept: PHYSICAL THERAPY | Facility: CLINIC | Age: 51
End: 2021-09-07

## 2021-09-07 DIAGNOSIS — M25.512 LEFT SHOULDER PAIN, UNSPECIFIED CHRONICITY: ICD-10-CM

## 2021-09-07 DIAGNOSIS — M25.512 ACUTE PAIN OF LEFT SHOULDER: Primary | ICD-10-CM

## 2021-09-07 PROCEDURE — 97110 THERAPEUTIC EXERCISES: CPT | Performed by: PHYSICAL THERAPIST

## 2021-09-07 NOTE — PROGRESS NOTES
Physical Therapy Daily Treatment Note      Patient: Rohith Henley   : 1970  Referring practitioner: Anh Brown*  Date of Initial Visit: Type: THERAPY  Noted: 8/3/2021  Today's Date: 2021  Patient seen for 7 sessions           Subjective Evaluation    History of Present Illness    Subjective comment: /10 pain in the shoudler - soreness noted from how he slept on it he thinks         Objective   See Exercise, Manual, and Modality Logs for complete treatment.       Assessment & Plan     Assessment  Assessment details: Pt able to tolerate all strengthening exercises, but did fatigue. Body blade exercise increased fatigue and caused soreness, but pt continued to be able to perform there ex and tolerate. Added in prone L shoulder stability and strengthening, pt able to tolerate.     Plan  Plan details: Progress with further strength and L shoulder endurance/tolerance.         Visit Diagnoses:    ICD-10-CM ICD-9-CM   1. Acute pain of left shoulder  M25.512 719.41   2. Left shoulder pain, unspecified chronicity  M25.512 719.41       Progress per Plan of Care and Progress strengthening /stabilization /functional activity           Timed:  Manual Therapy:         mins  56915;  Therapeutic Exercise:    26     mins  37750;     Neuromuscular Mamie:        mins  49755;    Therapeutic Activity:          mins  26565;     Gait Training:           mins  81968;     Ultrasound:          mins  62463;        Untimed:  Electrical Stimulation:         mins  89337 ( );  Mechanical Traction:         mins  61440;   Dry Needling:                      mins self pay    Timed Treatment:   26   mins   Total Treatment:     29   mins    Emy Cordova, PT, DPT  Physical Therapist

## 2021-09-09 ENCOUNTER — TREATMENT (OUTPATIENT)
Dept: PHYSICAL THERAPY | Facility: CLINIC | Age: 51
End: 2021-09-09

## 2021-09-09 PROCEDURE — 97110 THERAPEUTIC EXERCISES: CPT | Performed by: PHYSICAL THERAPIST

## 2021-09-09 PROCEDURE — 97112 NEUROMUSCULAR REEDUCATION: CPT | Performed by: PHYSICAL THERAPIST

## 2021-09-09 NOTE — PROGRESS NOTES
Physical Therapy Treatment Note      Patient: Rohith Henley   : 1970  Referring practitioner: Anh Brown*  Date of Initial Visit: Type: THERAPY  Noted: 8/3/2021  Today's Date: 2021  Patient seen for 8 sessions           Visit Diagnoses:  No diagnosis found.    Subjective Evaluation    History of Present Illness  Mechanism of injury: States the shoulder is feeling better. The body blade exercise was fatiguing.  HEP going well.            Objective   See Exercise, Manual, and Modality Logs for complete treatment.       Assessment & Plan     Assessment  Assessment details: Nice performance of exercises,no c/o pain.     Min VC required for scapular stabilization, positioning    Plan  Plan details: 2 more visits    Continue to progress scapular stabilization, strengthening, progress HEP.                Timed:  Manual Therapy:         mins  83278;  Therapeutic Exercise:    15     mins  01067;     Therapeutic Activity:          mins  65667;    Neuromuscular Mamie:    15    mins  57536;    Gait Training:           mins  83058;     Ultrasound:          mins  24504;    Mechanical Traction  ___ mins   50185       Timed Treatment:   30   mins   Total Treatment:     30   mins    Leigha Diallo PTA  Physical Therapist          This document has been electronically signed by Caro Cheney, PT on 2021 10:09 EDT

## 2021-09-13 ENCOUNTER — TREATMENT (OUTPATIENT)
Dept: PHYSICAL THERAPY | Facility: CLINIC | Age: 51
End: 2021-09-13

## 2021-09-13 DIAGNOSIS — M25.512 ACUTE PAIN OF LEFT SHOULDER: Primary | ICD-10-CM

## 2021-09-13 DIAGNOSIS — M25.512 LEFT SHOULDER PAIN, UNSPECIFIED CHRONICITY: ICD-10-CM

## 2021-09-13 PROCEDURE — 97140 MANUAL THERAPY 1/> REGIONS: CPT | Performed by: PHYSICAL THERAPIST

## 2021-09-13 PROCEDURE — 97110 THERAPEUTIC EXERCISES: CPT | Performed by: PHYSICAL THERAPIST

## 2021-09-13 NOTE — PROGRESS NOTES
Physical Therapy Daily Treatment Note      Patient: Rohith Henley   : 1970  Referring practitioner: Anh Brown*  Date of Initial Visit: Type: THERAPY  Noted: 8/3/2021  Today's Date: 2021  Patient seen for 9 sessions           Subjective Evaluation    History of Present Illness    Subjective comment: Pt states that he threw the ball yesterday and got a sharp pain in his shoulder, sore/aching today.  4-5/10 pain.         Objective   See Exercise, Manual, and Modality Logs for complete treatment.       Assessment & Plan     Assessment  Assessment details: Increased resistance with 4 lb weights today in several different shoulder directions.  Pt able to tolerate, some fatigue noticed at end of reps sets.  Education to continue with strengthening for HEP, no more than 5 lbs at home.     Plan  Plan details:  Continue to progress with stabilization/strengthening, add in functional activities such as overhead and throwing position for motion and stability.         Visit Diagnoses:    ICD-10-CM ICD-9-CM   1. Acute pain of left shoulder  M25.512 719.41   2. Left shoulder pain, unspecified chronicity  M25.512 719.41       Progress per Plan of Care and Progress strengthening /stabilization /functional activity           Timed:  Manual Therapy:    8     mins  08681;  Therapeutic Exercise:     16    mins  50199;     Neuromuscular Mamie:        mins  51239;    Therapeutic Activity:          mins  86799;     Gait Training:           mins  30496;     Ultrasound:          mins  65059;        Untimed:  Electrical Stimulation:         mins  15617 ( );  Mechanical Traction:         mins  23722;   Dry Needling:                      mins self pay    Timed Treatment:   24   mins   Total Treatment:     26   mins    Emy Cordova, PT, DPT  Physical Therapist

## 2021-09-16 ENCOUNTER — TREATMENT (OUTPATIENT)
Dept: PHYSICAL THERAPY | Facility: CLINIC | Age: 51
End: 2021-09-16

## 2021-09-16 DIAGNOSIS — M25.512 ACUTE PAIN OF LEFT SHOULDER: Primary | ICD-10-CM

## 2021-09-16 DIAGNOSIS — M25.512 LEFT SHOULDER PAIN, UNSPECIFIED CHRONICITY: ICD-10-CM

## 2021-09-16 PROCEDURE — 97110 THERAPEUTIC EXERCISES: CPT | Performed by: PHYSICAL THERAPIST

## 2021-09-16 PROCEDURE — 97112 NEUROMUSCULAR REEDUCATION: CPT | Performed by: PHYSICAL THERAPIST

## 2021-09-16 NOTE — PROGRESS NOTES
Physical Therapy Daily Treatment Note      Patient: Rohith Henley   : 1970  Referring practitioner: Anh Brown*  Date of Initial Visit: Type: THERAPY  Noted: 8/3/2021  Today's Date: 2021  Patient seen for 10 sessions           Subjective Evaluation    History of Present Illness    Subjective comment: There was no pain in the L shoulder today, but he does notice fatigue with exercises.          Objective   See Exercise, Manual, and Modality Logs for complete treatment.       Assessment & Plan     Assessment  Assessment details: L shoulder stabilization progressed today, added in prone positioning as tolerated.  Patient does fatigue out during some exercises, but isn't getting pain stopping him.  He has a 3lb and 5 lb weight at home, education to alternate or work with one that doesn't cause pain but able to tolerate with strengthening.     Plan  Plan details: Continue to progress with strength and stabilization on the L shoulder.     Moved patient to 1 x week for the next two weeks to see how he tolerates and progresses. Will progress to  with HEP and possible discharge.         Visit Diagnoses:    ICD-10-CM ICD-9-CM   1. Acute pain of left shoulder  M25.512 719.41   2. Left shoulder pain, unspecified chronicity  M25.512 719.41       Progress per Plan of Care and Progress strengthening /stabilization /functional activity           Timed:  Manual Therapy:         mins  08348;  Therapeutic Exercise:    18     mins  39326;     Neuromuscular Mamie:    8    mins  65121;    Therapeutic Activity:          mins  72988;     Gait Training:           mins  56753;     Ultrasound:          mins  13944;        Untimed:  Electrical Stimulation:         mins  48381 ( );  Mechanical Traction:         mins  96119;   Dry Needling:                      mins self pay    Timed Treatment:   26   mins   Total Treatment:     29   mins    Emy Cordova, PT, DPT  Physical Therapist

## 2021-09-21 ENCOUNTER — TELEPHONE (OUTPATIENT)
Dept: PHYSICAL THERAPY | Facility: CLINIC | Age: 51
End: 2021-09-21

## 2021-09-29 ENCOUNTER — TREATMENT (OUTPATIENT)
Dept: PHYSICAL THERAPY | Facility: CLINIC | Age: 51
End: 2021-09-29

## 2021-09-29 DIAGNOSIS — M25.512 LEFT SHOULDER PAIN, UNSPECIFIED CHRONICITY: Primary | ICD-10-CM

## 2021-09-29 DIAGNOSIS — M25.512 ACUTE PAIN OF LEFT SHOULDER: ICD-10-CM

## 2021-09-29 PROCEDURE — 97110 THERAPEUTIC EXERCISES: CPT | Performed by: PHYSICAL THERAPIST

## 2021-09-29 NOTE — PROGRESS NOTES
Physical Therapy Daily Treatment Note      Patient: Rohith Henley   : 1970  Referring practitioner: Anh Brown*  Date of Initial Visit: Type: THERAPY  Noted: 8/3/2021  Today's Date: 2021  Patient seen for 11 sessions           Subjective 0/10    Objective   See Exercise, Manual, and Modality Logs for complete treatment.       Assessment/Plan Pt started to fatigue in the SHLD with the body blade and wall tap activities. Pt started manual rhythmic stabilization at this visit. Pt started elevated push ups from the mat table without issue. Cont with the POC to progress pt toward goals.      Visit Diagnoses:    ICD-10-CM ICD-9-CM   1. Left shoulder pain, unspecified chronicity  M25.512 719.41   2. Acute pain of left shoulder  M25.512 719.41       Progress per Plan of Care    Timed:    Therapeutic Exercise:    30     mins  80438;  Manual Therapy:         mins  49355;     Neuromuscular Mamie:       mins  05203;    Therapeutic Activity:          mins  12372;     Gait Training:           mins  05186;     Ultrasound:          mins  83689;      Untimed:  Electrical Stimulation:         mins  43716 ( );  Mechanical Traction:         mins  01582;     Timed Treatment:   30   mins   Total Treatment:     40   mins      Leigha Diallo PTA  Physical Therapist Assistant

## 2021-09-30 ENCOUNTER — TREATMENT (OUTPATIENT)
Dept: PHYSICAL THERAPY | Facility: CLINIC | Age: 51
End: 2021-09-30

## 2021-09-30 DIAGNOSIS — M25.512 ACUTE PAIN OF LEFT SHOULDER: ICD-10-CM

## 2021-09-30 DIAGNOSIS — M25.512 LEFT SHOULDER PAIN, UNSPECIFIED CHRONICITY: Primary | ICD-10-CM

## 2021-09-30 PROCEDURE — 97110 THERAPEUTIC EXERCISES: CPT | Performed by: PHYSICAL THERAPIST

## 2021-09-30 PROCEDURE — 97112 NEUROMUSCULAR REEDUCATION: CPT | Performed by: PHYSICAL THERAPIST

## 2021-09-30 NOTE — PROGRESS NOTES
Re-Assessment / Re-Certification        Patient: Rohith Henley   : 1970  Diagnosis/ICD-10 Code:  Left shoulder pain, unspecified chronicity [M25.512]  Referring practitioner: Anh Brown*  Date of Initial Visit: Type: THERAPY  Noted: 8/3/2021  Today's Date: 2021  Patient seen for 12 sessions      Subjective:     Clinical Progress: IMPROVED  Home Program Compliance: YES  Treatment has included: therapeutic exercise, neuromuscular re-education and manual therapy    Subjective   Objective          Active Range of Motion   Left Shoulder   Flexion: 180 degrees   Extension: 60 degrees   Abduction: 180 degrees   External rotation 0°: 80 degrees   Internal rotation 0°: 80 degrees     Strength/Myotome Testing     Left Shoulder     Planes of Motion   Flexion: 5   Extension: 5   Abduction: 5   External rotation at 0°: 5   Internal rotation at 0°: 5       Assessment & Plan     Assessment  Impairments: abnormal or restricted ROM, impaired physical strength and pain with function  Assessment details: Pain is no longer in the shoulder.  ROM and strength are WFL and pain-free. His quick DASH is 0% limitations. He does still have some endurance lacking in the L shoulder, but that will come with time and as he continues with his HEP.  He will be discharged from PT services at this time. HEP was reviewed and performed, provided him with green band.     Functional Limitations: carrying objects, lifting, uncomfortable because of pain and reaching behind back  Goals  Plan Goals: SHOULDER  PROBLEMS:     1. The patient has limited ROM of the left shoulder.    LTG 1: 12 weeks:  The patient will demonstrate 180 degrees of shoulder abduction, 80 degrees of shoulder external rotation, and 80 degrees of shoulder internal rotation to allow the patient to reach into upper kitchen cabinets and manipulate clothing behind the back with greater ease.    STATUS:  Met   STG 1a: 4 weeks:  The patient will demonstrate 140  degrees of shoulder abduction, 70 degrees of shoulder external rotation, and 75 degrees of shoulder internal rotation.    STATUS:   Met      TREATMENT: Manual therapy, therapeutic exercise, home exercise instruction, and modalities as needed to include: electrical stimulation, ultrasound, moist heat, and ice.    2. The patient has limited strength of the left shoulder.   LTG 2: 12 weeks:  The patient will demonstrate 5 /5 strength for left shoulder flexion, abduction, external rotation, and internal rotation in order to demonstrate improved shoulder stability.    STATUS:  Met   STG 2a: 4 weeks:  The patient will demonstrate 4+ /5 strength for left shoulder flexion, abduction, external rotation, and internal rotation.    STATUS:  Met   STG2b:  4 weeks:  The patient will be independent with home exercises.     STATUS:  Met   TREATMENT: Manual therapy, therapeutic exercise, home exercise instruction, and modalities as needed to include: electrical stimulation, ultrasound, moist heat, and ice.     3. The patient complains of pain to the left shoulder.   LTG 3: 12 weeks:  The patient will report a pain rating of 2 /10 or better in order to improve sleep quality and tolerance to performance of activities of daily living.    STATUS:  Met   STG 3a: 4 weeks:  The patient will report a pain rating of 4 /10 or better.     STATUS:  Met   TREATMENT: Manual therapy, therapeutic exercise, home exercise instruction, and modalities as needed to include: electrical stimulation, ultrasound, moist heat, and ice.    Plan  Therapy options: will not be seen for skilled physical therapy services  Planned therapy interventions: home exercise program  Treatment plan discussed with: patient  Plan details: D/c,  continue with HEP        Visit Diagnoses:    ICD-10-CM ICD-9-CM   1. Left shoulder pain, unspecified chronicity  M25.512 719.41   2. Acute pain of left shoulder  M25.512 719.41       Progress toward previous goals: All Met    PT  Signature: Eym Cordova, PT, DPT  Physical Therapist      Based upon review of the patient's progress and continued therapy plan, it is my medical opinion that Rohith Henley should continue physical therapy treatment at [facility] PHYS[facility] TH[facility]AP[facility]  [address] [place] [id number] [phone].    Signature: __________________________________  Anh Coker APRN    Timed:  Manual Therapy:         mins  04491;  Therapeutic Exercise:    12     mins  71091;     Neuromuscular Mamie:    11    mins  08636;    Therapeutic Activity:          mins  97004;     Gait Training:           mins  12309;     Ultrasound:          mins  53047;        Untimed:  Electrical Stimulation:         mins  88275 ( );  Mechanical Traction:         mins  65689;   Dry Needling:                      mins self pay    _______________________________________    Timed Treatment:   23   mins   Total Treatment:     25   mins    Discharge Summary  Discharge Summary from Physical Therapy Report    Patient Information  Rohith Henley  1970    Number of Visits: 12     Goals: All Met    Visit Diagnoses:    ICD-10-CM ICD-9-CM   1. Left shoulder pain, unspecified chronicity  M25.512 719.41   2. Acute pain of left shoulder  M25.512 719.41       Discharge Plan: Continue with current home exercise program as instructed    Date of Discharge 9/30/2021        Emy Cordova, PT, DPT  Physical Therapist

## 2022-01-05 ENCOUNTER — DOCUMENTATION (OUTPATIENT)
Dept: PHYSICAL THERAPY | Facility: CLINIC | Age: 52
End: 2022-01-05

## 2022-06-22 ENCOUNTER — OFFICE VISIT (OUTPATIENT)
Dept: FAMILY MEDICINE CLINIC | Age: 52
End: 2022-06-22

## 2022-06-22 ENCOUNTER — LAB (OUTPATIENT)
Dept: LAB | Facility: HOSPITAL | Age: 52
End: 2022-06-22

## 2022-06-22 VITALS
HEART RATE: 72 BPM | TEMPERATURE: 97.8 F | HEIGHT: 73 IN | BODY MASS INDEX: 32.74 KG/M2 | SYSTOLIC BLOOD PRESSURE: 136 MMHG | DIASTOLIC BLOOD PRESSURE: 77 MMHG | WEIGHT: 247 LBS

## 2022-06-22 DIAGNOSIS — Z12.5 SCREENING FOR PROSTATE CANCER: ICD-10-CM

## 2022-06-22 DIAGNOSIS — Z12.11 COLON CANCER SCREENING: ICD-10-CM

## 2022-06-22 DIAGNOSIS — Z00.00 ANNUAL PHYSICAL EXAM: Primary | ICD-10-CM

## 2022-06-22 DIAGNOSIS — M70.62 TROCHANTERIC BURSITIS, LEFT HIP: ICD-10-CM

## 2022-06-22 DIAGNOSIS — E78.00 HIGH CHOLESTEROL: ICD-10-CM

## 2022-06-22 DIAGNOSIS — I10 ESSENTIAL HYPERTENSION: ICD-10-CM

## 2022-06-22 DIAGNOSIS — E10.9 TYPE 1 DIABETES MELLITUS WITHOUT COMPLICATION: ICD-10-CM

## 2022-06-22 PROBLEM — I77.810 DILATED AORTIC ROOT (HCC): Status: ACTIVE | Noted: 2022-06-22

## 2022-06-22 PROBLEM — G47.30 SLEEP APNEA: Status: RESOLVED | Noted: 2017-08-02 | Resolved: 2022-06-22

## 2022-06-22 LAB
ALBUMIN SERPL-MCNC: 4.1 G/DL (ref 3.5–5.2)
ALBUMIN/GLOB SERPL: 1.6 G/DL
ALP SERPL-CCNC: 63 U/L (ref 39–117)
ALT SERPL W P-5'-P-CCNC: 21 U/L (ref 1–41)
ANION GAP SERPL CALCULATED.3IONS-SCNC: 12.2 MMOL/L (ref 5–15)
AST SERPL-CCNC: 20 U/L (ref 1–40)
BILIRUB SERPL-MCNC: 0.4 MG/DL (ref 0–1.2)
BUN SERPL-MCNC: 17 MG/DL (ref 6–20)
BUN/CREAT SERPL: 15.6 (ref 7–25)
CALCIUM SPEC-SCNC: 9.1 MG/DL (ref 8.6–10.5)
CHLORIDE SERPL-SCNC: 101 MMOL/L (ref 98–107)
CHOLEST SERPL-MCNC: 154 MG/DL (ref 0–200)
CO2 SERPL-SCNC: 24.8 MMOL/L (ref 22–29)
CREAT SERPL-MCNC: 1.09 MG/DL (ref 0.76–1.27)
DEPRECATED RDW RBC AUTO: 40.7 FL (ref 37–54)
EGFRCR SERPLBLD CKD-EPI 2021: 81.7 ML/MIN/1.73
ERYTHROCYTE [DISTWIDTH] IN BLOOD BY AUTOMATED COUNT: 12.9 % (ref 12.3–15.4)
GLOBULIN UR ELPH-MCNC: 2.5 GM/DL
GLUCOSE SERPL-MCNC: 128 MG/DL (ref 65–99)
HBA1C MFR BLD: 7.4 % (ref 4.8–5.6)
HCT VFR BLD AUTO: 43.5 % (ref 37.5–51)
HDLC SERPL-MCNC: 46 MG/DL (ref 40–60)
HGB BLD-MCNC: 14.4 G/DL (ref 13–17.7)
LDLC SERPL CALC-MCNC: 95 MG/DL (ref 0–100)
LDLC/HDLC SERPL: 2.07 {RATIO}
MCH RBC QN AUTO: 28.3 PG (ref 26.6–33)
MCHC RBC AUTO-ENTMCNC: 33.1 G/DL (ref 31.5–35.7)
MCV RBC AUTO: 85.6 FL (ref 79–97)
PLATELET # BLD AUTO: 180 10*3/MM3 (ref 140–450)
PMV BLD AUTO: 10.9 FL (ref 6–12)
POTASSIUM SERPL-SCNC: 4.3 MMOL/L (ref 3.5–5.2)
PROT SERPL-MCNC: 6.6 G/DL (ref 6–8.5)
PSA SERPL-MCNC: 0.7 NG/ML (ref 0–4)
RBC # BLD AUTO: 5.08 10*6/MM3 (ref 4.14–5.8)
SODIUM SERPL-SCNC: 138 MMOL/L (ref 136–145)
TRIGL SERPL-MCNC: 64 MG/DL (ref 0–150)
TSH SERPL DL<=0.05 MIU/L-ACNC: 2.43 UIU/ML (ref 0.27–4.2)
VLDLC SERPL-MCNC: 13 MG/DL (ref 5–40)
WBC NRBC COR # BLD: 7.12 10*3/MM3 (ref 3.4–10.8)

## 2022-06-22 PROCEDURE — 85027 COMPLETE CBC AUTOMATED: CPT | Performed by: FAMILY MEDICINE

## 2022-06-22 PROCEDURE — 80061 LIPID PANEL: CPT | Performed by: FAMILY MEDICINE

## 2022-06-22 PROCEDURE — 83036 HEMOGLOBIN GLYCOSYLATED A1C: CPT | Performed by: FAMILY MEDICINE

## 2022-06-22 PROCEDURE — 80053 COMPREHEN METABOLIC PANEL: CPT | Performed by: FAMILY MEDICINE

## 2022-06-22 PROCEDURE — 36415 COLL VENOUS BLD VENIPUNCTURE: CPT | Performed by: FAMILY MEDICINE

## 2022-06-22 PROCEDURE — 99396 PREV VISIT EST AGE 40-64: CPT | Performed by: FAMILY MEDICINE

## 2022-06-22 PROCEDURE — G0103 PSA SCREENING: HCPCS | Performed by: FAMILY MEDICINE

## 2022-06-22 PROCEDURE — 84443 ASSAY THYROID STIM HORMONE: CPT | Performed by: FAMILY MEDICINE

## 2022-06-22 RX ORDER — DICLOFENAC SODIUM 75 MG/1
75 TABLET, DELAYED RELEASE ORAL 2 TIMES DAILY
Qty: 30 TABLET | Refills: 1 | Status: SHIPPED | OUTPATIENT
Start: 2022-06-22 | End: 2022-09-07 | Stop reason: SDUPTHER

## 2022-06-22 RX ORDER — LISINOPRIL 10 MG/1
10 TABLET ORAL DAILY
COMMUNITY
Start: 2021-07-23 | End: 2022-09-07

## 2022-06-22 NOTE — ASSESSMENT & PLAN NOTE
MEDS AS NOTED.  ADVISE HEAT, ROM DURING THE DAY AND COLD PACK AT NIGHT.  CONSIDER X-RAYS AND P.T. EVAL

## 2022-06-22 NOTE — PROGRESS NOTES
Chief Complaint  Hip Pain (Left side, goes down left leg)    Subjective          Rohith Henley presents to [facility]  --ANNUAL EXAM, LAST EXAM UNKNOWN, DOES NOT SMOKE, HAS NOT HAD A COLONOSCOPY  --FOLLOWED BY ENDOCRINE FOR HIS DIABETES  --HAD AN ECHO AND STRESS TEST LAST FALL, NO REPORT IN CHART  --LEFT LATERAL HIP HAS HURT FOR THE PAST WEEK, RADIATES DOWN THE LEG        No Known Allergies     Health Maintenance Due   Topic Date Due   • ANNUAL PHYSICAL  Never done   • HEPATITIS C SCREENING  Never done   • DIABETIC EYE EXAM  01/27/2018   • DIABETIC FOOT EXAM  08/02/2018   • URINE MICROALBUMIN  04/12/2020        Current Outpatient Medications on File Prior to Visit   Medication Sig   • aspirin 81 MG tablet Take 81 mg by mouth daily.   • atorvastatin (LIPITOR) 80 MG tablet TAKE 1 TABLET EVERY NIGHT   • glucose blood (RIKY CONTOUR NEXT TEST) test strip TESTING BS 4 X DAY  DX CODE E10   • glucose blood test strip OneTouch Ultra Blue In Vitro Strip; Patient Sig: OneTouch Ultra Blue In Vitro Strip TESTING 3 TIMES DAILY; 3; 3; 13-Aug-2013; Active   • insulin lispro (HUMALOG) 100 UNIT/ML injection Using 115 units in pump daily / NO MORE REFILLS UNTIL SEEN IN THE OFFICE   • Insulin Pen Needle (PEN NEEDLES) 31G X 6 MM misc    • Lancets (ONETOUCH ULTRASOFT) lancets 1 each by Other route.   • lisinopril (PRINIVIL,ZESTRIL) 10 MG tablet Take 10 mg by mouth Daily.     No current facility-administered medications on file prior to visit.       Immunization History   Administered Date(s) Administered   • COVID-19 (MODERNA) 1st, 2nd, 3rd Dose Only 03/31/2021, 04/28/2021       Review of Systems   Constitutional: Negative for activity change, appetite change, chills, fatigue and fever.   HENT: Negative for congestion, ear pain, hearing loss, rhinorrhea and sore throat.    Eyes: Negative for blurred vision and discharge.   Respiratory: Negative for cough and shortness of breath.    Cardiovascular:  "Negative for chest pain, palpitations and leg swelling.   Gastrointestinal: Negative for abdominal pain, constipation, diarrhea, nausea and vomiting.   Genitourinary: Negative for dysuria and hematuria.   Musculoskeletal: Negative for arthralgias and myalgias.   Neurological: Negative for headache.   Psychiatric/Behavioral: Negative for depressed mood.        Objective     /77 (BP Location: Left arm, Patient Position: Sitting)   Pulse 72   Temp 97.8 °F (36.6 °C) (Oral)   Ht 185.4 cm (73\")   Wt 112 kg (247 lb)   BMI 32.59 kg/m²       Physical Exam  Vitals and nursing note reviewed.   Constitutional:       General: He is not in acute distress.     Appearance: Normal appearance.   HENT:      Right Ear: Tympanic membrane normal.      Left Ear: Tympanic membrane normal.      Mouth/Throat:      Pharynx: Oropharynx is clear.   Eyes:      Conjunctiva/sclera: Conjunctivae normal.   Cardiovascular:      Rate and Rhythm: Normal rate and regular rhythm.      Pulses: Normal pulses.           Dorsalis pedis pulses are 2+ on the right side and 2+ on the left side.        Posterior tibial pulses are 2+ on the right side and 2+ on the left side.      Heart sounds: Normal heart sounds. No murmur heard.  Pulmonary:      Effort: Pulmonary effort is normal.      Breath sounds: Normal breath sounds.   Abdominal:      General: Bowel sounds are normal.      Palpations: Abdomen is soft.      Tenderness: There is no abdominal tenderness.   Musculoskeletal:      Cervical back: Neck supple.      Right lower leg: No edema.      Left lower leg: No edema.      Right foot: Normal range of motion. No deformity, bunion, Charcot foot, foot drop or prominent metatarsal heads.      Left foot: Normal range of motion. No deformity, bunion, Charcot foot, foot drop or prominent metatarsal heads.      Comments: TENDER OVER LEFT GREATER TROCHANTER, HIP WITH FULL ROM   Feet:      Right foot:      Protective Sensation: 4 sites tested. 4 sites " sensed.      Skin integrity: Skin integrity normal.      Toenail Condition: Right toenails are normal.      Left foot:      Protective Sensation: 4 sites tested. 4 sites sensed.      Skin integrity: Skin integrity normal.      Toenail Condition: Left toenails are normal.      Comments:      Lymphadenopathy:      Cervical: No cervical adenopathy.   Neurological:      General: No focal deficit present.      Mental Status: He is alert.      Cranial Nerves: No cranial nerve deficit.      Coordination: Coordination normal.      Gait: Gait normal.   Psychiatric:         Mood and Affect: Mood normal.         Behavior: Behavior normal.         Result Review :                             Assessment and Plan      Diagnoses and all orders for this visit:    1. Annual physical exam (Primary)  Assessment & Plan:  ADVICE GIVEN RE:  SEATBELT USE, ALCOHOL USE, HEALTHY DIET, ROUTINE EYE AND DENTAL EXAM, ROUTINE VACCINATIONS.  DECLINES COVID BOOSTER AT THIS TIME.  WILL GET ECHO AND STRESS TEST RESULTS.  ENDOCRINOLOGY NOTES REVIEWED.        2. Screening for prostate cancer  -     PSA Screen    3. Essential hypertension  -     CBC (No Diff)  -     Comprehensive Metabolic Panel  -     TSH    4. High cholesterol  -     Lipid Panel    5. Type 1 diabetes mellitus without complication (HCC)  -     Hemoglobin A1c    6. Colon cancer screening  -     Ambulatory Referral to General Surgery    7. Trochanteric bursitis, left hip  Assessment & Plan:  MEDS AS NOTED.  ADVISE HEAT, ROM DURING THE DAY AND COLD PACK AT NIGHT.  CONSIDER X-RAYS AND P.T. EVAL     Orders:  -     diclofenac (VOLTAREN) 75 MG EC tablet; Take 1 tablet by mouth 2 (Two) Times a Day.  Dispense: 30 tablet; Refill: 1          Follow Up     Return in about 3 months (around 9/22/2022).    Patient was given instructions and counseling regarding his condition or for health maintenance advice. Please see specific information pulled into the AVS if appropriate.

## 2022-06-22 NOTE — ASSESSMENT & PLAN NOTE
ADVICE GIVEN RE:  SEATBELT USE, ALCOHOL USE, HEALTHY DIET, ROUTINE EYE AND DENTAL EXAM, ROUTINE VACCINATIONS.  DECLINES COVID BOOSTER AT THIS TIME.  WILL GET ECHO AND STRESS TEST RESULTS.  ENDOCRINOLOGY NOTES REVIEWED.

## 2022-08-05 ENCOUNTER — OFFICE VISIT (OUTPATIENT)
Dept: FAMILY MEDICINE CLINIC | Age: 52
End: 2022-08-05

## 2022-08-05 ENCOUNTER — HOSPITAL ENCOUNTER (OUTPATIENT)
Dept: GENERAL RADIOLOGY | Facility: HOSPITAL | Age: 52
Discharge: HOME | End: 2022-08-05
Admitting: PHYSICIAN ASSISTANT

## 2022-08-05 VITALS
SYSTOLIC BLOOD PRESSURE: 132 MMHG | BODY MASS INDEX: 32.66 KG/M2 | HEIGHT: 73 IN | DIASTOLIC BLOOD PRESSURE: 72 MMHG | HEART RATE: 72 BPM | WEIGHT: 246.4 LBS

## 2022-08-05 DIAGNOSIS — L53.9 ERYTHEMA OF TOE: ICD-10-CM

## 2022-08-05 DIAGNOSIS — M79.674 TOE PAIN, RIGHT: Primary | ICD-10-CM

## 2022-08-05 DIAGNOSIS — M79.674 TOE PAIN, RIGHT: ICD-10-CM

## 2022-08-05 PROCEDURE — 73630 X-RAY EXAM OF FOOT: CPT

## 2022-08-05 PROCEDURE — 99214 OFFICE O/P EST MOD 30 MIN: CPT | Performed by: PHYSICIAN ASSISTANT

## 2022-08-05 RX ORDER — SULFAMETHOXAZOLE AND TRIMETHOPRIM 800; 160 MG/1; MG/1
1 TABLET ORAL 2 TIMES DAILY
Qty: 14 TABLET | Refills: 0 | Status: SHIPPED | OUTPATIENT
Start: 2022-08-05 | End: 2022-08-12

## 2022-08-05 NOTE — PROGRESS NOTES
Subjective     CHIEF COMPLAINT    Chief Complaint   Patient presents with   • Possible Broken Toe     Pt states that he broke his toe on his (R) foot. Swelling and purple.                This is a 52-year-old male presenting to the clinic complaining of swelling and purple discoloration to his right third toe.  He states he just noticed this last night and denies any specific injury.  He has been feeling well with no fevers or chills.  He does have past medical history of type 2 diabetes managed with insulin but states his blood sugars have been running well and last hemoglobin A1c was 7.4.            Review of Systems   Constitutional: Negative for chills and fever.   Cardiovascular: Negative for palpitations.   Gastrointestinal: Negative for nausea and vomiting.   Musculoskeletal: Positive for joint swelling. Negative for myalgias.   Skin: Positive for wound.   Neurological: Negative for weakness and numbness.            Past Medical History:   Diagnosis Date   • Arthritis    • Cubital tunnel syndrome 09/03/2015   • Diabetes mellitus, type 2 (HCC)    • H/O knee surgery     2x - R   • Hyperlipidemia    • Lateral epicondylitis 09/03/2015   • Type 2 diabetes mellitus (HCC)             Past Surgical History:   Procedure Laterality Date   • BACK SURGERY      CYST REMOVED   • CYST REMOVAL     • KNEE SURGERY Right 01/30/2014    REPAIR    • TOE SURGERY      IN GROWN TOENAIL    • TRIGGER FINGER RELEASE Right     MIDDLE FINGER             Family History   Problem Relation Age of Onset   • Stroke Father    • Hypertension Father    • Diabetes Brother    • Diabetes Brother    • Hypertension Brother    • Hypertension Brother             Social History     Socioeconomic History   • Marital status:    Tobacco Use   • Smoking status: Never Smoker   • Smokeless tobacco: Never Used   Vaping Use   • Vaping Use: Never used   Substance and Sexual Activity   • Alcohol use: No   • Drug use: Never            No Known Allergies      "    Current Outpatient Medications on File Prior to Visit   Medication Sig Dispense Refill   • aspirin 81 MG tablet Take 81 mg by mouth daily.     • atorvastatin (LIPITOR) 80 MG tablet TAKE 1 TABLET EVERY NIGHT 90 tablet 0   • diclofenac (VOLTAREN) 75 MG EC tablet Take 1 tablet by mouth 2 (Two) Times a Day. 30 tablet 1   • glucose blood (RIKY CONTOUR NEXT TEST) test strip TESTING BS 4 X DAY  DX CODE E10 400 each 1   • glucose blood test strip OneTouch Ultra Blue In Vitro Strip; Patient Sig: OneTouch Ultra Blue In Vitro Strip TESTING 3 TIMES DAILY; 3; 3; 13-Aug-2013; Active     • insulin lispro (HUMALOG) 100 UNIT/ML injection Using 115 units in pump daily / NO MORE REFILLS UNTIL SEEN IN THE OFFICE 110 mL 0   • Insulin Pen Needle (PEN NEEDLES) 31G X 6 MM misc      • Lancets (ONETOUCH ULTRASOFT) lancets 1 each by Other route.     • lisinopril (PRINIVIL,ZESTRIL) 10 MG tablet Take 10 mg by mouth Daily.       No current facility-administered medications on file prior to visit.            /72 (BP Location: Left arm, Patient Position: Sitting)   Pulse 72   Ht 185.4 cm (73\")   Wt 112 kg (246 lb 6.4 oz)   BMI 32.51 kg/m²          Objective     Physical Exam  Vitals and nursing note reviewed.   Constitutional:       General: He is not in acute distress.     Appearance: Normal appearance.   Cardiovascular:      Pulses:           Dorsalis pedis pulses are 2+ on the right side.        Posterior tibial pulses are 2+ on the right side.   Pulmonary:      Effort: Pulmonary effort is normal. No respiratory distress.   Feet:      Right foot:      Skin integrity: Blister (hematoma at base of right 3rd toenail) and erythema (right 3rd toe) present.      Toenail Condition: Right toenails are normal.   Skin:     General: Skin is warm and dry.   Neurological:      Mental Status: He is alert and oriented to person, place, and time.   Psychiatric:         Mood and Affect: Mood normal.         Behavior: Behavior normal.          "     Procedures                    Lab Results (last 24 hours)     ** No results found for the last 24 hours. **                No Radiology Exams Resulted Within Past 24 Hours                Dr. Plata examined patient with me and advised on treatment plan.     Diagnoses and all orders for this visit:    1. Toe pain, right (Primary)  -     XR Foot 3+ View Right; Future    2. Erythema of toe  -     sulfamethoxazole-trimethoprim (BACTRIM DS,SEPTRA DS) 800-160 MG per tablet; Take 1 tablet by mouth 2 (Two) Times a Day for 7 days.  Dispense: 14 tablet; Refill: 0             Additional Instructions for the Follow-ups that You Need to Schedule     XR Foot 3+ View Right   Aug 10, 2022      Exam reason: swelling and erythema of 3rd toe    Release to patient: Immediate                         FOR FULL DISCHARGE INSTRUCTIONS/COMMENTS/HANDOUTS please see the AVS

## 2022-08-11 DIAGNOSIS — M79.674 PAIN IN RIGHT TOE(S): Primary | ICD-10-CM

## 2022-09-07 ENCOUNTER — OFFICE VISIT (OUTPATIENT)
Dept: FAMILY MEDICINE CLINIC | Age: 52
End: 2022-09-07

## 2022-09-07 ENCOUNTER — HOSPITAL ENCOUNTER (OUTPATIENT)
Dept: GENERAL RADIOLOGY | Facility: HOSPITAL | Age: 52
Discharge: HOME | End: 2022-09-07
Admitting: FAMILY MEDICINE

## 2022-09-07 VITALS
HEART RATE: 91 BPM | SYSTOLIC BLOOD PRESSURE: 96 MMHG | BODY MASS INDEX: 32.18 KG/M2 | HEIGHT: 73 IN | WEIGHT: 242.8 LBS | DIASTOLIC BLOOD PRESSURE: 63 MMHG

## 2022-09-07 DIAGNOSIS — I10 ESSENTIAL HYPERTENSION: ICD-10-CM

## 2022-09-07 DIAGNOSIS — M54.32 SCIATICA, LEFT SIDE: ICD-10-CM

## 2022-09-07 DIAGNOSIS — M70.62 TROCHANTERIC BURSITIS, LEFT HIP: ICD-10-CM

## 2022-09-07 DIAGNOSIS — E78.00 HIGH CHOLESTEROL: ICD-10-CM

## 2022-09-07 DIAGNOSIS — M25.552 ACUTE HIP PAIN, LEFT: Primary | ICD-10-CM

## 2022-09-07 DIAGNOSIS — E10.9 TYPE 1 DIABETES MELLITUS WITHOUT COMPLICATION: ICD-10-CM

## 2022-09-07 PROBLEM — Z00.00 ANNUAL PHYSICAL EXAM: Status: RESOLVED | Noted: 2022-06-22 | Resolved: 2022-09-07

## 2022-09-07 PROCEDURE — 73502 X-RAY EXAM HIP UNI 2-3 VIEWS: CPT

## 2022-09-07 PROCEDURE — 99214 OFFICE O/P EST MOD 30 MIN: CPT | Performed by: FAMILY MEDICINE

## 2022-09-07 RX ORDER — CYCLOBENZAPRINE HCL 10 MG
10 TABLET ORAL NIGHTLY PRN
Qty: 30 TABLET | Refills: 1 | Status: SHIPPED | OUTPATIENT
Start: 2022-09-07

## 2022-09-07 RX ORDER — DICLOFENAC SODIUM 75 MG/1
75 TABLET, DELAYED RELEASE ORAL 2 TIMES DAILY
Qty: 180 TABLET | Refills: 1 | Status: SHIPPED | OUTPATIENT
Start: 2022-09-07

## 2022-09-07 NOTE — ASSESSMENT & PLAN NOTE
WILL START AN NSAID AND A MUSCLE RELAXER.  ADVISE COLD PACKS IN THE EVENING AND WILL GET X-RAYS AND SEND TO P.T.  MAY NEED AN MRI

## 2022-09-07 NOTE — PROGRESS NOTES
Chief Complaint  Sciatica (Left side, from hip down to foot)    Subjective          Rohith Henley presents to [facility]  --RECENT HGA1C WAS 7.4 %, ON AN INSULIN PUMP, FOLLOWED BY ENDOCRINE  --RECENT LIPIDS WERE OK, NO ISSUES WITH MEDS  --TOLERATING BLOOD PRESSURE MEDICATION WITHOUT APPARENT SIDE EFFECTS  --HAS CONTINUED HAVING PAIN IN THE LATERAL LEFT HIP BUT NOW ALSO IN THE POSTERIOR HIP AND THE PAIN RADIATES DOWN THE BACK OF THE LEG        No Known Allergies     Health Maintenance Due   Topic Date Due   • HEPATITIS C SCREENING  Never done   • DIABETIC EYE EXAM  01/27/2018   • DIABETIC FOOT EXAM  08/02/2018   • URINE MICROALBUMIN  04/12/2020   • ZOSTER VACCINE (1 of 2) Never done   • COVID-19 Vaccine (3 - Booster for Moderna series) 09/28/2021        Current Outpatient Medications on File Prior to Visit   Medication Sig   • aspirin 81 MG tablet Take 81 mg by mouth daily.   • atorvastatin (LIPITOR) 80 MG tablet TAKE 1 TABLET EVERY NIGHT   • glucose blood (RIKY CONTOUR NEXT TEST) test strip TESTING BS 4 X DAY  DX CODE E10   • glucose blood test strip OneTouch Ultra Blue In Vitro Strip; Patient Sig: OneTouch Ultra Blue In Vitro Strip TESTING 3 TIMES DAILY; 3; 3; 13-Aug-2013; Active   • insulin lispro (HUMALOG) 100 UNIT/ML injection Using 115 units in pump daily / NO MORE REFILLS UNTIL SEEN IN THE OFFICE   • Insulin Pen Needle (PEN NEEDLES) 31G X 6 MM misc    • Lancets (ONETOUCH ULTRASOFT) lancets 1 each by Other route.   • lisinopril (PRINIVIL,ZESTRIL) 10 MG tablet Take 10 mg by mouth Daily.   • [DISCONTINUED] diclofenac (VOLTAREN) 75 MG EC tablet Take 1 tablet by mouth 2 (Two) Times a Day.     No current facility-administered medications on file prior to visit.       Immunization History   Administered Date(s) Administered   • COVID-19 (MODERNA) 1st, 2nd, 3rd Dose Only 03/31/2021, 04/28/2021       Review of Systems   Constitutional: Negative for activity change, appetite change,  "chills, fatigue and fever.   HENT: Negative for congestion, ear pain, rhinorrhea and sore throat.    Respiratory: Negative for cough and shortness of breath.    Cardiovascular: Negative for chest pain, palpitations and leg swelling.   Gastrointestinal: Negative for abdominal pain, constipation, diarrhea, nausea and vomiting.   Musculoskeletal: Negative for arthralgias and myalgias.   Neurological: Negative for headache.        Objective     BP 96/63 (BP Location: Left arm, Patient Position: Sitting)   Pulse 91   Ht 185.4 cm (73\")   Wt 110 kg (242 lb 12.8 oz)   BMI 32.03 kg/m²       Physical Exam  Vitals and nursing note reviewed.   Constitutional:       General: He is not in acute distress.     Appearance: Normal appearance.   Cardiovascular:      Rate and Rhythm: Normal rate and regular rhythm.      Heart sounds: Normal heart sounds. No murmur heard.  Pulmonary:      Effort: Pulmonary effort is normal.      Breath sounds: Normal breath sounds.   Abdominal:      Palpations: Abdomen is soft.      Tenderness: There is no abdominal tenderness.   Musculoskeletal:      Cervical back: Neck supple.      Right lower leg: No edema.      Left lower leg: No edema.      Comments: BACK WITH FULL ROM, TENDER IN SEFT S-I AND LEFT GREATER TROCHANTER.  POSITIVE SLR.     Lymphadenopathy:      Cervical: No cervical adenopathy.   Neurological:      General: No focal deficit present.      Mental Status: He is alert.      Cranial Nerves: No cranial nerve deficit.      Coordination: Coordination normal.      Gait: Gait normal.   Psychiatric:         Mood and Affect: Mood normal.         Behavior: Behavior normal.         Result Review :                             Assessment and Plan      Diagnoses and all orders for this visit:    1. Acute hip pain, left (Primary)  -     XR Hip With or Without Pelvis 2 - 3 View Left  -     Ambulatory Referral to Physical Therapy  -     diclofenac (VOLTAREN) 75 MG EC tablet; Take 1 tablet by mouth 2 " (Two) Times a Day.  Dispense: 180 tablet; Refill: 1  -     cyclobenzaprine (FLEXERIL) 10 MG tablet; Take 1 tablet by mouth At Night As Needed for Muscle Spasms.  Dispense: 30 tablet; Refill: 1    2. Trochanteric bursitis, left hip  -     Ambulatory Referral to Physical Therapy  -     diclofenac (VOLTAREN) 75 MG EC tablet; Take 1 tablet by mouth 2 (Two) Times a Day.  Dispense: 180 tablet; Refill: 1  -     cyclobenzaprine (FLEXERIL) 10 MG tablet; Take 1 tablet by mouth At Night As Needed for Muscle Spasms.  Dispense: 30 tablet; Refill: 1    3. Sciatica, left side  Assessment & Plan:  WILL START AN NSAID AND A MUSCLE RELAXER.  ADVISE COLD PACKS IN THE EVENING AND WILL GET X-RAYS AND SEND TO P.T.  MAY NEED AN MRI      Orders:  -     Ambulatory Referral to Physical Therapy  -     diclofenac (VOLTAREN) 75 MG EC tablet; Take 1 tablet by mouth 2 (Two) Times a Day.  Dispense: 180 tablet; Refill: 1  -     cyclobenzaprine (FLEXERIL) 10 MG tablet; Take 1 tablet by mouth At Night As Needed for Muscle Spasms.  Dispense: 30 tablet; Refill: 1    4. Type 1 diabetes mellitus without complication (Endocrinology)  Assessment & Plan:  Diabetes is improving with treatment.   Continue current treatment regimen.  Diabetes will be reassessed in 6 months.      5. High cholesterol  Assessment & Plan:  Lipid abnormalities are improving with treatment.  Nutritional counseling was provided.  Lipids will be reassessed in 6 months.      6. Essential hypertension  Assessment & Plan:  Hypertension is improving with treatment.  Continue current treatment regimen.  Weight loss.  Continue current medications.  Blood pressure will be reassessed at the next regular appointment.            Follow Up     Return in about 6 months (around 3/7/2023), or if symptoms worsen or fail to improve.    Patient was given instructions and counseling regarding his condition or for health maintenance advice. Please see specific information pulled into the AVS if  appropriate.       Answers for HPI/ROS submitted by the patient on 9/7/2022  What is the primary reason for your visit?: Other  Please describe your symptoms.: Pain from hip to the ankle. Pain in upper back between shoulder blades  Have you had these symptoms before?: Yes  How long have you been having these symptoms?: Greater than 2 weeks  Please list any medications you are currently taking for this condition.: Atorvastatin, Insulin, Aspirin , Lisinapril  Please describe any probable cause for these symptoms. : Not sure.

## 2022-09-14 ENCOUNTER — OFFICE VISIT (OUTPATIENT)
Dept: FAMILY MEDICINE CLINIC | Age: 52
End: 2022-09-14

## 2022-09-14 VITALS
HEART RATE: 83 BPM | BODY MASS INDEX: 32.95 KG/M2 | OXYGEN SATURATION: 98 % | WEIGHT: 248.6 LBS | DIASTOLIC BLOOD PRESSURE: 76 MMHG | SYSTOLIC BLOOD PRESSURE: 132 MMHG | HEIGHT: 73 IN

## 2022-09-14 DIAGNOSIS — S61.412A LACERATION OF LEFT PALM, INITIAL ENCOUNTER: Primary | ICD-10-CM

## 2022-09-14 PROCEDURE — 12001 RPR S/N/AX/GEN/TRNK 2.5CM/<: CPT | Performed by: NURSE PRACTITIONER

## 2022-09-22 NOTE — PROGRESS NOTES
"Chief Complaint  Laceration (On hand, left)    Subjective        Rohith Henley presents to [facility]  Laceration   The incident occurred 12 to 24 hours ago. The laceration is located on the left hand. The laceration is 1 cm in size. The laceration mechanism was a metal edge. The patient is experiencing no pain. He reports no foreign bodies present. His tetanus status is UTD.       Objective   Vital Signs:  /76 (BP Location: Left arm, Patient Position: Sitting, Cuff Size: Adult)   Pulse 83   Ht 185.4 cm (73\")   Wt 113 kg (248 lb 9.6 oz)   SpO2 98%   BMI 32.80 kg/m²   Estimated body mass index is 32.8 kg/m² as calculated from the following:    Height as of this encounter: 185.4 cm (73\").    Weight as of this encounter: 113 kg (248 lb 9.6 oz).          Physical Exam  HENT:      Head: Normocephalic.   Cardiovascular:      Rate and Rhythm: Normal rate.   Pulmonary:      Effort: Pulmonary effort is normal.   Skin:     General: Skin is warm and dry.      Findings: Laceration present.          Neurological:      Mental Status: He is alert and oriented to person, place, and time.   Psychiatric:         Mood and Affect: Mood normal.        Result Review :                Assessment and Plan   Diagnoses and all orders for this visit:    1. Laceration of left palm, initial encounter (Primary)  Comments:  Too late for sutures, laceration glued, patient tolerated well, no bleeding             Follow Up   Return if symptoms worsen or fail to improve.  Patient was given instructions and counseling regarding his condition or for health maintenance advice. Please see specific information pulled into the AVS if appropriate.       "

## 2022-10-04 ENCOUNTER — OFFICE VISIT (OUTPATIENT)
Dept: PODIATRY | Facility: CLINIC | Age: 52
End: 2022-10-04

## 2022-10-04 DIAGNOSIS — E11.9 TYPE 2 DIABETES MELLITUS WITHOUT COMPLICATION, WITH LONG-TERM CURRENT USE OF INSULIN: Primary | ICD-10-CM

## 2022-10-04 DIAGNOSIS — Z79.4 TYPE 2 DIABETES MELLITUS WITHOUT COMPLICATION, WITH LONG-TERM CURRENT USE OF INSULIN: Primary | ICD-10-CM

## 2022-10-04 PROCEDURE — G8404 LOW EXTEMITY NEUR EXAM DOCUM: HCPCS | Performed by: PODIATRIST

## 2022-10-04 PROCEDURE — 99243 OFF/OP CNSLTJ NEW/EST LOW 30: CPT | Performed by: PODIATRIST

## 2022-10-04 NOTE — PROGRESS NOTES
[facility] - PODIATRY    Today's Date: 10/04/22    Patient Name: Rohith Henley  MRN: [id number]  CSN: [id number]  PCP: Frankie Merritt MD, Last PCP Visit:  9/7/2022  Referring Provider: Christine Myrick PA-C    SUBJECTIVE     Chief Complaint   Patient presents with   • Left Foot - Annual Exam, Diabetes, Establish Care   • Right Foot - Diabetes, Annual Exam, Establish Care     Pt states he had a spot on his right foot third toe. He was recommended to have it looked at due to having diabetes. Since it has resolved.      HPI: Rohith Henley, a 52 y.o.male, presents to clinic.    New, Established, New Problem:  new    Duration:  >5 years    Onset:  incidious    Nature:  IDDM    Stable, worsening, improving:  stable    Aggravating factors:  Patient relates pain is aggravated by shoe gear and ambulation.      Previous Treatment: insulin pump    Patient states their most recent blood glucose reading was 100.      Patient denies any fevers, chills, nausea, vomiting, shortness of breath, nor any other constitutional signs nor symptoms.    Past Medical History:   Diagnosis Date   • Arthritis    • Cubital tunnel syndrome 09/03/2015   • Diabetes mellitus, type 2 (HCC)    • H/O knee surgery     2x - R   • Hyperlipidemia    • Lateral epicondylitis 09/03/2015   • Type 2 diabetes mellitus (HCC)      Past Surgical History:   Procedure Laterality Date   • BACK SURGERY      CYST REMOVED   • COLONOSCOPY  2022    NORMAL   • CYST REMOVAL     • KNEE SURGERY Right 01/30/2014    REPAIR    • TOE SURGERY      IN GROWN TOENAIL    • TRIGGER FINGER RELEASE Right     MIDDLE FINGER      Family History   Problem Relation Age of Onset   • Stroke Father    • Hypertension Father    • Diabetes Brother    • Diabetes Brother    • Hypertension Brother    • Hypertension Brother      Social History     Socioeconomic History   • Marital status:    Tobacco Use   • Smoking status: Never Smoker   • Smokeless tobacco: Never  Used   Vaping Use   • Vaping Use: Never used   Substance and Sexual Activity   • Alcohol use: No   • Drug use: Never     No Known Allergies  Current Outpatient Medications   Medication Sig Dispense Refill   • aspirin 81 MG tablet Take 81 mg by mouth daily.     • atorvastatin (LIPITOR) 80 MG tablet TAKE 1 TABLET EVERY NIGHT 90 tablet 0   • cyclobenzaprine (FLEXERIL) 10 MG tablet Take 1 tablet by mouth At Night As Needed for Muscle Spasms. 30 tablet 1   • diclofenac (VOLTAREN) 75 MG EC tablet Take 1 tablet by mouth 2 (Two) Times a Day. 180 tablet 1   • glucose blood (RIKY CONTOUR NEXT TEST) test strip TESTING BS 4 X DAY  DX CODE E10 400 each 1   • glucose blood test strip OneTouch Ultra Blue In Vitro Strip; Patient Sig: OneTouch Ultra Blue In Vitro Strip TESTING 3 TIMES DAILY; 3; 3; 13-Aug-2013; Active     • insulin lispro (HUMALOG) 100 UNIT/ML injection Using 115 units in pump daily / NO MORE REFILLS UNTIL SEEN IN THE OFFICE 110 mL 0   • Insulin Pen Needle (PEN NEEDLES) 31G X 6 MM misc      • Lancets (ONETOUCH ULTRASOFT) lancets 1 each by Other route.     • lisinopril (PRINIVIL,ZESTRIL) 10 MG tablet Take 10 mg by mouth Daily.       No current facility-administered medications for this visit.     Review of Systems   Constitutional: Negative.    All other systems reviewed and are negative.      OBJECTIVE   There were no vitals filed for this visit.    WBC   Date Value Ref Range Status   06/22/2022 7.12 3.40 - 10.80 10*3/mm3 Final     RBC   Date Value Ref Range Status   06/22/2022 5.08 4.14 - 5.80 10*6/mm3 Final     Hemoglobin   Date Value Ref Range Status   06/22/2022 14.4 13.0 - 17.7 g/dL Final     Hematocrit   Date Value Ref Range Status   06/22/2022 43.5 37.5 - 51.0 % Final     MCV   Date Value Ref Range Status   06/22/2022 85.6 79.0 - 97.0 fL Final     MCH   Date Value Ref Range Status   06/22/2022 28.3 26.6 - 33.0 pg Final     MCHC   Date Value Ref Range Status   06/22/2022 33.1 31.5 - 35.7 g/dL Final     RDW    Date Value Ref Range Status   06/22/2022 12.9 12.3 - 15.4 % Final     RDW-SD   Date Value Ref Range Status   06/22/2022 40.7 37.0 - 54.0 fl Final     MPV   Date Value Ref Range Status   06/22/2022 10.9 6.0 - 12.0 fL Final     Platelets   Date Value Ref Range Status   06/22/2022 180 140 - 450 10*3/mm3 Final         Lab Results   Component Value Date    GLUCOSE 128 (H) 06/22/2022    BUN 17 06/22/2022    CREATININE 1.09 06/22/2022    EGFRIFNONA 65 07/09/2021    EGFRIFAFRI 101 12/13/2017    BCR 15.6 06/22/2022    K 4.3 06/22/2022    CO2 24.8 06/22/2022    CALCIUM 9.1 06/22/2022    PROTENTOTREF 7.4 12/13/2017    ALBUMIN 4.10 06/22/2022    LABIL2 1.4 10/19/2019    AST 20 06/22/2022    ALT 21 06/22/2022       Patient seen in no apparent distress.      PHYSICAL EXAM:     Foot/Ankle Exam:       General:   Diabetic Foot Exam Performed    Appearance: appears stated age and healthy    Orientation: AAOx3    Affect: appropriate    Gait: unimpaired    Shoe Gear:  Casual shoes    VASCULAR      Right Foot Vascularity   Normal vascular exam    Dorsalis pedis:  2+  Posterior tibial:  2+  Skin Temperature: warm    Edema Grading:  None  CFT:  < 3 seconds  Pedal Hair Growth:  Present  Varicosities: none       Left Foot Vascularity   Normal vascular exam    Dorsalis pedis:  2+  Posterior tibial:  2+  Skin Temperature: warm    Edema Grading:  None  CFT:  < 3 seconds  Pedal Hair Growth:  Present  Varicosities: none        NEUROLOGIC     Right Foot Neurologic   Normal sensation    Light touch sensation:  Normal  Vibratory sensation:  Normal  Hot/Cold sensation: normal    Protective Sensation using [place] Monofilament:  10     Left Foot Neurologic   Normal sensation    Light touch sensation:  Normal  Vibratory sensation:  Normal  Hot/cold sensation: normal    Protective Sensation using [place] Monofilament:  10     MUSCULOSKELETAL      Right Foot Musculoskeletal   Hammertoe:  Third toe and second toe     MUSCLE  STRENGTH     Right Foot Muscle Strength   Foot dorsiflexion:  4  Foot plantar flexion:  4  Foot inversion:  4  Foot eversion:  4     Left Foot Muscle Strength   Foot dorsiflexion:  4  Foot plantar flexion:  4  Foot inversion:  4  Foot eversion:  4     RANGE OF MOTION      Right Foot Range of Motion   Foot and ankle ROM within normal limits       Left Foot Range of Motion   Foot and ankle ROM within normal limits       DERMATOLOGIC     Right Foot Dermatologic   Skin: skin intact    Nails: normal       Left Foot Dermatologic   Skin: skin intact    Nails: normal        ASSESSMENT/PLAN     Diagnoses and all orders for this visit:    1. Type 2 diabetes mellitus without complication, with long-term current use of insulin (HCC) (Primary)        Comprehensive lower extremity examination and evaluation was performed.    Discussed findings and treatment plan including risks, benefits, and treatment options with patient in detail. Patient agreed with treatment plan.    Medications and allergies reviewed.  Reviewed available lab values along with other pertinent labs.  These were discussed with the patient.    An After Visit Summary was printed and given to the patient at discharge, including (if requested) any available informative/educational handouts regarding diagnosis, treatment, or medications. All questions were answered to patient/family satisfaction. Should symptoms fail to improve or worsen they agree to call or return to clinic or to go to the Emergency Department. Discussed the importance of following up with any needed screening tests/labs/specialist appointments and any requested follow-up recommended by me today. Importance of maintaining follow-up discussed and patient accepts that missed appointments can delay diagnosis and potentially lead to worsening of conditions.    Return in about 1 year (around 10/4/2023) for Podiatry Diabetic Foot Exam., or sooner if acute issues arise.    This document has been  electronically signed by Noman Morales DPM on October 4, 2022 10:49 EDT

## 2022-10-05 ENCOUNTER — PATIENT ROUNDING (BHMG ONLY) (OUTPATIENT)
Dept: PODIATRY | Facility: CLINIC | Age: 52
End: 2022-10-05

## 2022-10-05 NOTE — PROGRESS NOTES
A [employer] message has been sent to the patient for PATIENT ROUNDING [place] [facility]

## 2023-05-02 NOTE — PROGRESS NOTES
"Chief Complaint  Hearing Loss (Pt c/o hearing loss in his (R) ear, ongoing for about a month.)    Subjective          Rohith Henley presents to [facility]  History of Present Illness  He does wear earplugs at work.  He will occasionally use Q-tips to clean his ears out.  Ear Fullness   There is pain in the right ear. This is a new problem. The current episode started more than 1 month ago. The problem has been unchanged. There has been no fever. Associated symptoms include hearing loss. Pertinent negatives include no ear discharge, rhinorrhea or sore throat. He has tried nothing for the symptoms.       Objective   Vital Signs:   /72 (BP Location: Left arm, Patient Position: Sitting)   Pulse 72   Ht 185.4 cm (73\")   Wt 109 kg (239 lb 3.2 oz)   BMI 31.56 kg/m²     Physical Exam  Constitutional:       General: He is not in acute distress.     Appearance: Normal appearance. He is normal weight.   HENT:      Head: Normocephalic.      Right Ear: There is impacted cerumen.      Left Ear: Tympanic membrane, ear canal and external ear normal.   Eyes:      Pupils: Pupils are equal, round, and reactive to light.      Visual Fields: Right eye visual fields normal and left eye visual fields normal.   Neck:      Trachea: Trachea normal.   Cardiovascular:      Rate and Rhythm: Normal rate and regular rhythm.      Heart sounds: Normal heart sounds.   Pulmonary:      Effort: Pulmonary effort is normal.      Breath sounds: Normal breath sounds and air entry.   Musculoskeletal:      Right lower leg: No edema.      Left lower leg: No edema.   Skin:     General: Skin is warm and dry.   Neurological:      Mental Status: He is alert and oriented to person, place, and time.   Psychiatric:         Mood and Affect: Mood and affect normal.         Behavior: Behavior normal.         Thought Content: Thought content normal.        Result Review :   The following data was reviewed by: Anh" GORDO Coker on 05/03/2023:                  Assessment and Plan    Diagnoses and all orders for this visit:    1. Impacted cerumen of right ear (Primary)  -     Cerumen Removal    He does have impacted cerumen of the right ear.  We will clean his ear out today in office.  Recommend avoiding using Q-tips.  May use a drop of mineral oil or Debrox and see if that will help loosen his wax      Follow Up   Return if symptoms worsen or fail to improve.  Patient was given instructions and counseling regarding his condition or for health maintenance advice. Please see specific information pulled into the AVS if appropriate.

## 2023-05-03 ENCOUNTER — OFFICE VISIT (OUTPATIENT)
Dept: FAMILY MEDICINE CLINIC | Age: 53
End: 2023-05-03
Payer: COMMERCIAL

## 2023-05-03 VITALS
HEART RATE: 72 BPM | HEIGHT: 73 IN | DIASTOLIC BLOOD PRESSURE: 72 MMHG | BODY MASS INDEX: 31.7 KG/M2 | SYSTOLIC BLOOD PRESSURE: 124 MMHG | WEIGHT: 239.2 LBS

## 2023-05-03 DIAGNOSIS — H61.21 IMPACTED CERUMEN OF RIGHT EAR: Primary | ICD-10-CM

## 2023-05-03 RX ORDER — ASPIRIN 81 MG/1
81 TABLET ORAL DAILY
COMMUNITY

## 2023-05-03 RX ORDER — MELOXICAM 15 MG/1
TABLET ORAL EVERY 24 HOURS
COMMUNITY
End: 2023-05-03

## 2023-05-03 RX ORDER — INSULIN ASPART 100 [IU]/ML
INJECTION, SOLUTION INTRAVENOUS; SUBCUTANEOUS
COMMUNITY
Start: 2023-01-20

## 2023-05-03 RX ORDER — INSULIN GLARGINE 100 [IU]/ML
INJECTION, SOLUTION SUBCUTANEOUS SEE ADMIN INSTRUCTIONS
COMMUNITY

## 2023-05-03 RX ORDER — TURMERIC 400 MG
CAPSULE ORAL
COMMUNITY

## 2023-05-03 NOTE — PROGRESS NOTES
Ear Cerumen Removal    Date/Time: 5/3/2023 9:07 AM  Performed by: Carina Booth MA  Authorized by: Anh Coker APRN   Ceruminolytics applied: Ceruminolytics applied prior to the procedure.  Location details: right ear  Patient tolerance: patient tolerated the procedure well with no immediate complications  Procedure type: irrigation   Sedation:  Patient sedated: no

## 2023-06-19 ENCOUNTER — OFFICE VISIT (OUTPATIENT)
Dept: FAMILY MEDICINE CLINIC | Age: 53
End: 2023-06-19
Payer: COMMERCIAL

## 2023-06-19 VITALS
WEIGHT: 231 LBS | BODY MASS INDEX: 30.62 KG/M2 | DIASTOLIC BLOOD PRESSURE: 66 MMHG | SYSTOLIC BLOOD PRESSURE: 99 MMHG | HEIGHT: 73 IN | HEART RATE: 75 BPM | OXYGEN SATURATION: 95 %

## 2023-06-19 DIAGNOSIS — Z00.00 ANNUAL PHYSICAL EXAM: Primary | ICD-10-CM

## 2023-06-19 DIAGNOSIS — I10 ESSENTIAL HYPERTENSION: ICD-10-CM

## 2023-06-19 DIAGNOSIS — M15.9 GENERALIZED OSTEOARTHRITIS: ICD-10-CM

## 2023-06-19 DIAGNOSIS — G47.33 OBSTRUCTIVE SLEEP APNEA: ICD-10-CM

## 2023-06-19 PROBLEM — M25.552 ACUTE HIP PAIN, LEFT: Status: RESOLVED | Noted: 2022-09-07 | Resolved: 2023-06-19

## 2023-06-19 PROBLEM — M70.62 TROCHANTERIC BURSITIS, LEFT HIP: Status: RESOLVED | Noted: 2022-06-22 | Resolved: 2023-06-19

## 2023-06-19 PROBLEM — M54.32 SCIATICA, LEFT SIDE: Status: RESOLVED | Noted: 2022-09-07 | Resolved: 2023-06-19

## 2023-06-19 PROCEDURE — 99396 PREV VISIT EST AGE 40-64: CPT | Performed by: FAMILY MEDICINE

## 2023-06-19 RX ORDER — DICLOFENAC SODIUM 75 MG/1
75 TABLET, DELAYED RELEASE ORAL 2 TIMES DAILY
Qty: 180 TABLET | Refills: 3 | Status: SHIPPED | OUTPATIENT
Start: 2023-06-19

## 2023-06-19 NOTE — PROGRESS NOTES
Chief Complaint  Diabetes (Check up)    Subjective          Rohith Henley presents to [facility]  History of Present Illness  --ANNUAL EXAM, LAST EXAM WAS ONE YEAR AGO, DOES NOT SMOKE, COLONOSCOPY WAS IN 2022  --BP USUALLY RUNS OK AT HOME  --WAS DIAGNOSED WITH EM BUT NEVER STARTED USING CPAP       No Known Allergies     Health Maintenance Due   Topic Date Due    HEPATITIS C SCREENING  Never done    DIABETIC EYE EXAM  01/27/2018    URINE MICROALBUMIN  04/12/2020    ZOSTER VACCINE (1 of 2) Never done    COVID-19 Vaccine (3 - Moderna series) 06/23/2021        Current Outpatient Medications on File Prior to Visit   Medication Sig    aspirin 81 MG EC tablet Take 1 tablet by mouth Daily.    atorvastatin (LIPITOR) 80 MG tablet TAKE 1 TABLET EVERY NIGHT    cyclobenzaprine (FLEXERIL) 10 MG tablet Take 1 tablet by mouth At Night As Needed for Muscle Spasms.    glucose blood (RIKY CONTOUR NEXT TEST) test strip TESTING BS 4 X DAY  DX CODE E10    glucose blood test strip OneTouch Ultra Blue In Vitro Strip; Patient Sig: OneTouch Ultra Blue In Vitro Strip TESTING 3 TIMES DAILY; 3; 3; 13-Aug-2013; Active    Insulin Aspart (novoLOG) 100 UNIT/ML injection INJECT VIA INSULIN PUMP. MAX DAILY DOSE  UNITS    Insulin Glargine (Lantus SoloStar) 100 UNIT/ML injection pen See Admin Instructions.    Insulin Pen Needle (PEN NEEDLES) 31G X 6 MM misc     Lancets (ONETOUCH ULTRASOFT) lancets 1 each by Other route.    lisinopril (PRINIVIL,ZESTRIL) 10 MG tablet Take 1 tablet by mouth Daily.    Turmeric 400 MG capsule Take  by mouth.    [DISCONTINUED] diclofenac (VOLTAREN) 75 MG EC tablet Take 1 tablet by mouth 2 (Two) Times a Day.    [DISCONTINUED] insulin lispro (HUMALOG) 100 UNIT/ML injection Using 115 units in pump daily / NO MORE REFILLS UNTIL SEEN IN THE OFFICE     No current facility-administered medications on file prior to visit.       Immunization History   Administered Date(s) Administered     "COVID-19 (MODERNA) 1st,2nd,3rd Dose Monovalent 03/31/2021, 04/28/2021    Tdap 11/08/2018       Review of Systems   Constitutional:  Positive for fatigue. Negative for activity change, appetite change, chills and fever.   HENT:  Negative for congestion, ear pain, hearing loss, rhinorrhea and sore throat.    Eyes:  Negative for blurred vision and discharge.   Respiratory:  Negative for cough and shortness of breath.    Cardiovascular:  Negative for chest pain, palpitations and leg swelling.   Gastrointestinal:  Negative for abdominal pain, constipation, diarrhea, nausea and vomiting.   Genitourinary:  Negative for dysuria and hematuria.   Musculoskeletal:  Negative for arthralgias and myalgias.   Neurological:  Negative for headache.   Psychiatric/Behavioral:  Negative for depressed mood.       Objective     BP 99/66 (BP Location: Left arm, Patient Position: Sitting)   Pulse 75   Ht 185.4 cm (73\")   Wt 105 kg (231 lb)   SpO2 95% Comment: on room air  BMI 30.48 kg/m²       Physical Exam  Vitals and nursing note reviewed.   Constitutional:       General: He is not in acute distress.     Appearance: Normal appearance.   HENT:      Right Ear: Tympanic membrane normal.      Left Ear: Tympanic membrane normal.      Mouth/Throat:      Pharynx: Oropharynx is clear.   Eyes:      Conjunctiva/sclera: Conjunctivae normal.   Cardiovascular:      Rate and Rhythm: Normal rate and regular rhythm.      Heart sounds: Normal heart sounds. No murmur heard.  Pulmonary:      Effort: Pulmonary effort is normal.      Breath sounds: Normal breath sounds.   Abdominal:      General: Bowel sounds are normal.      Palpations: Abdomen is soft.      Tenderness: There is no abdominal tenderness.   Musculoskeletal:      Cervical back: Neck supple.      Right lower leg: No edema.      Left lower leg: No edema.   Lymphadenopathy:      Cervical: No cervical adenopathy.   Neurological:      General: No focal deficit present.      Mental Status: He " is alert.      Cranial Nerves: No cranial nerve deficit.      Coordination: Coordination normal.      Gait: Gait normal.   Psychiatric:         Mood and Affect: Mood normal.         Behavior: Behavior normal.       Result Review :                             Assessment and Plan      Diagnoses and all orders for this visit:    1. Annual physical exam (Primary)  Assessment & Plan:  ADVICE GIVEN RE:  SEATBELT USE, ALCOHOL USE, HEALTHY DIET, ROUTINE EYE AND DENTAL EXAM, ROUTINE VACCINATIONS.    WILL ASK HIS ENDOCRINOLOGIST TO ADD A CBC AND A PSA TO HIS NEXT SET OF LABS      2. Essential hypertension  Assessment & Plan:  Hypertension is improving with treatment.  Continue current treatment regimen.  Blood pressure will be reassessed at the next regular appointment  ADVISED CONTINUED MONITORING AT HOME, MAY NEED A DJOSE REDUCTION .      3. Generalized osteoarthritis  -     diclofenac (VOLTAREN) 75 MG EC tablet; Take 1 tablet by mouth 2 (Two) Times a Day.  Dispense: 180 tablet; Refill: 3    4. Obstructive sleep apnea  -     Ambulatory Referral to Sleep Medicine            Follow Up     Return in about 6 months (around 12/19/2023).    Patient was given instructions and counseling regarding his condition or for health maintenance advice. Please see specific information pulled into the AVS if appropriate.

## 2023-06-19 NOTE — ASSESSMENT & PLAN NOTE
Hypertension is improving with treatment.  Continue current treatment regimen.  Blood pressure will be reassessed at the next regular appointment  ADVISED CONTINUED MONITORING AT HOME, MAY NEED A DJOSE REDUCTION .

## 2023-06-19 NOTE — ASSESSMENT & PLAN NOTE
ADVICE GIVEN RE:  SEATBELT USE, ALCOHOL USE, HEALTHY DIET, ROUTINE EYE AND DENTAL EXAM, ROUTINE VACCINATIONS.    WILL ASK HIS ENDOCRINOLOGIST TO ADD A CBC AND A PSA TO HIS NEXT SET OF LABS

## 2023-06-20 ENCOUNTER — TELEPHONE (OUTPATIENT)
Dept: ENDOCRINOLOGY | Age: 53
End: 2023-06-20

## 2023-06-20 NOTE — TELEPHONE ENCOUNTER
Caller: ADRIEL    Relationship to patient: Provider    Best call back number: [phone]    Patient is needing: ADRIEL FROM DR GUZMÁN'S OFFICE AT [facility].  THE PATIENT WAS SEEN AT THIS OFFICE RECENTLY, AND STATED THAT HE WAS DUE TO HAVE LABS DONE FOR DR SAMUEL SOON.      DR GUZMÁN WOULD LIKE TO KNOW IF DR SAMUEL COULD ADD A CBC AND PSA TO THE PATIENT'S NEXT LABWORK.  YOU CAN CONTACT THE OFFICE AT THE NUMBER LISTED ABOVE FOR ANY CLARIFICATION.  THANK YOU.

## 2023-09-20 ENCOUNTER — OFFICE VISIT (OUTPATIENT)
Dept: SLEEP MEDICINE | Facility: HOSPITAL | Age: 53
End: 2023-09-20
Payer: COMMERCIAL

## 2023-09-20 VITALS
SYSTOLIC BLOOD PRESSURE: 119 MMHG | DIASTOLIC BLOOD PRESSURE: 70 MMHG | BODY MASS INDEX: 30.67 KG/M2 | WEIGHT: 231.4 LBS | OXYGEN SATURATION: 96 % | HEIGHT: 73 IN | HEART RATE: 68 BPM

## 2023-09-20 DIAGNOSIS — E66.9 OBESITY (BMI 30-39.9): ICD-10-CM

## 2023-09-20 DIAGNOSIS — G47.36 HYPOXEMIA ASSOCIATED WITH SLEEP: ICD-10-CM

## 2023-09-20 DIAGNOSIS — E10.9 TYPE 1 DIABETES MELLITUS WITHOUT COMPLICATION: ICD-10-CM

## 2023-09-20 DIAGNOSIS — G47.33 OSA (OBSTRUCTIVE SLEEP APNEA): Primary | ICD-10-CM

## 2023-09-20 DIAGNOSIS — R06.83 SNORING: ICD-10-CM

## 2023-09-20 DIAGNOSIS — I10 ESSENTIAL HYPERTENSION: ICD-10-CM

## 2023-09-20 PROCEDURE — G0463 HOSPITAL OUTPT CLINIC VISIT: HCPCS

## 2023-09-20 RX ORDER — IBUPROFEN 200 MG
TABLET ORAL
COMMUNITY

## 2023-09-20 NOTE — PROGRESS NOTES
Sleep Consultation    Patient Name: Rohith Henley  Age/Sex: 53 y.o. male  : 1970  MRN: [id number]    Date of Encounter Visit: 2023  Encounter Provider: Ulises Gary MD  Referring Provider: Frankie Merritt,*  Place of Service: [facility]  Patient Care Team:  Frankie Merritt MD as PCP - Jude Brunner OD (Optometry)  Marshall Gomez MD as Consulting Physician (Endocrinology)  Braulio Franklin DO (Orthopedic Surgery)    Subjective:     Reason for Consult: EM    History of Present Illness:  Rohith Henley is a 53 y.o. male is here for evaluation of EM  Patient had a prior evaluation with in lab polysomnography done on 2016.  The records of that sleep study  including the detailed report and tables were available and they were reviewed and summarized below  Patient had CPAP titration on 2016 that showed good control on CPAP auto range 8-18  Patient is currently on no treatment  Patient complains of daytime fatigue and sleepiness despite a normal [place] Sleepiness Scale (ESS) of 2.  Patient complains of the subjective sleepiness, loud snoring in all position with witnessed apnea  Denies any symptoms of restless leg syndrome.   Patient denies any cataplexy, sleep paralysis or other symptoms to suggest narcolepsy.  Patient denies any parasomnias.  Denies any history of seizure disorder or recent head trauma.  Patient spends adequate amount of time in bed with no evidence of sleep restriction or improper sleep hygiene.  Sleep onset is around 10:30 PM wake up time is around 4 in the morning, patient reports 5 hours of sleep in between, sleep onset within 10 minutes and patient's wakes up feeling okay.  Caffeine intake is high with 4 caffeinated soda beverages per day, no smoking or alcohol or illicit substance abuse  Comorbidities include: Hyperlipidemia and insulin-dependent diabetes    Review of Systems:   A twelve-system review was  conducted and was negative except for the following:  Frequent urination, ear pain neck pain TMJ pain, cough shortness of breath and wheezing, and occasional dysphagia.        Past Medical History:  Past Medical History:   Diagnosis Date    Arthritis     Cubital tunnel syndrome 09/03/2015    Diabetes mellitus, type 2     H/O knee surgery     2x - R    Hyperlipidemia     Lateral epicondylitis 09/03/2015    Type 2 diabetes mellitus        Past Surgical History:   Procedure Laterality Date    BACK SURGERY      CYST REMOVED    COLONOSCOPY  2022    NORMAL    CYST REMOVAL      KNEE SURGERY Right 01/30/2014    REPAIR     TOE SURGERY      IN GROWN TOENAIL     TRIGGER FINGER RELEASE Right     MIDDLE FINGER        Home Medications:     Current Outpatient Medications:     aspirin 81 MG EC tablet, Take 1 tablet by mouth Daily., Disp: , Rfl:     atorvastatin (LIPITOR) 80 MG tablet, TAKE 1 TABLET EVERY NIGHT, Disp: 90 tablet, Rfl: 0    cyclobenzaprine (FLEXERIL) 10 MG tablet, Take 1 tablet by mouth At Night As Needed for Muscle Spasms., Disp: 30 tablet, Rfl: 1    diclofenac (VOLTAREN) 75 MG EC tablet, Take 1 tablet by mouth 2 (Two) Times a Day., Disp: 180 tablet, Rfl: 3    glucose blood (RIKY CONTOUR NEXT TEST) test strip, TESTING BS 4 X DAY  DX CODE E10, Disp: 400 each, Rfl: 1    glucose blood test strip, OneTouch Ultra Blue In Vitro Strip; Patient Sig: OneTouch Ultra Blue In Vitro Strip TESTING 3 TIMES DAILY; 3; 3; 13-Aug-2013; Active, Disp: , Rfl:     ibuprofen (ADVIL,MOTRIN) 200 MG tablet, ibuprofen, Disp: , Rfl:     Insulin Aspart (novoLOG) 100 UNIT/ML injection, INJECT VIA INSULIN PUMP. MAX DAILY DOSE  UNITS, Disp: , Rfl:     Insulin Glargine (Lantus SoloStar) 100 UNIT/ML injection pen, See Admin Instructions., Disp: , Rfl:     Insulin Pen Needle (PEN NEEDLES) 31G X 6 MM misc, , Disp: , Rfl:     Lancets (ONETOUCH ULTRASOFT) lancets, 1 each by Other route., Disp: , Rfl:     lisinopril (PRINIVIL,ZESTRIL) 10 MG tablet,  "Take 1 tablet by mouth Daily., Disp: , Rfl:     Allergies:  No Known Allergies    Past Social History:  Social History     Socioeconomic History    Marital status:    Tobacco Use    Smoking status: Never    Smokeless tobacco: Never   Vaping Use    Vaping Use: Never used   Substance and Sexual Activity    Alcohol use: No    Drug use: Never    Sexual activity: Defer       Past Family History:  Family History   Problem Relation Age of Onset    Stroke Father     Hypertension Father     Diabetes Brother     Diabetes Brother     Hypertension Brother     Sleep apnea Brother     Hypertension Brother         Objective:        Vital Signs:   Visit Vitals  /70   Pulse 68   Ht 185.4 cm (73\")   Wt 105 kg (231 lb 6.4 oz)   SpO2 96%   BMI 30.53 kg/m²     Wt Readings from Last 3 Encounters:   09/20/23 105 kg (231 lb 6.4 oz)   06/19/23 105 kg (231 lb)   05/03/23 109 kg (239 lb 3.2 oz)     Neck Circumference: 16 inches    Physical Exam:   GEN:  No acute distress, alert, cooperative, well developed   EYES:   Sclerae clear. No icterus. PERRL. Normal EOM  ENT:   External ears/nose normal, no oral lesions, no thrush, mucous membranes moist, Septum midline. Mallampati IV airway. Large uvula, Redundant membranous soft palate   NECK:  Supple, midline trachea, no JVD  LUNGS: Normal chest on inspection, CTAB, no wheezes. No rhonchi. No crackles. Respirations regular, even and unlabored.   CV:  Regular rhythm and rate. Normal S1/S2. No murmurs, gallops, or rubs noted.  ABD:  Soft, nontender and nondistended. Normal bowel sounds. No guarding  EXT:  Moves all extremities well. No cyanosis. No redness. No edema.   Skin: Dry, intact, no bleeding      Diagnostic Data:  In lab diagnostic polysomnography 7/14/2016:  Moderate obstructive sleep apnea with AHI of 15.7, with a supine of 24.7  Sleep-related hypoxemia with a camilla desaturation down to 87% with no clinically significant hypoxemia with only 2.3-minute of total sleep time spent " in the hypoxemic range, that was Cher and REM sleep  No periodic leg movement disorder  Weight was 240 pounds     In lab CPAP titration study done on 9/15/2016 showed good control on CPAP pressures of 8 and beyond however there has been some higher pressure requirements depending on certain position and REM sleep and auto CPAP 8-18 was recommended    Assessment and Plan:       ICD-10-CM ICD-9-CM   1. EM (obstructive sleep apnea)  G47.33 327.23   2. Snoring  R06.83 786.09   3. Obesity (BMI 30-39.9)  E66.9 278.00   4. Type 1 diabetes mellitus without complication (Endocrinology)  E10.9 250.01   5. Essential hypertension  I10 401.9       Recommendations:     Patient is interested in trying CPAP.   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, so I will order CPAP 10-20 cm H20  The sleep-related hypoxemia was only for a couple of minutes and it is clinically insignificant  Patient is agreeable with the treatment and we will follow-up on his CPAP download  I did review and explained all the abnormal finding on the previous sleep study and the indication for the treatment specially with underlying diabetes mellitus which can be further risk factor for cardiovascular diseases  Patient has no significant weight changes since his previous sleep study and his sleep apnea is likely the same now as compared to the time of his previous polysomnography  Based on my independent review of the CPAP titration study, I will start the patient on an auto CPAP with a minimum pressure of 10 and high pressure of 20  Patient was strongly advised to contact me if he feels the CPAP pressure is too high and he was instructed on how to use the ramp option      If patient has obstructive sleep apnea the recommend treatment is CPAP and will start CPAP and patient will follow up within 31-90 days after starting CPAP for compliance review.   Will address alternative treatment option if intolerant to CPAP     Adherence to  the CPAP is a key factor in successful treatment of EM and the patient was encouraged to contact us in case of problem with the CPAP or the mask that can limit the tolerance of the compliance with the therapy.    Patient was educated about the impact of obesity on sleep apnea and the benefit of weight loss and weight loss was recommended    Orders Placed This Encounter   Procedures    PAP Therapy     No orders of the defined types were placed in this encounter.     Return in about 3 months (around 12/20/2023).    Ulises Gary MD   [facility]   09/20/23  12:21 EDT    Dictated utilizing Dragon dictation

## 2023-10-05 ENCOUNTER — OFFICE VISIT (OUTPATIENT)
Dept: PODIATRY | Facility: CLINIC | Age: 53
End: 2023-10-05
Payer: COMMERCIAL

## 2023-10-05 VITALS
SYSTOLIC BLOOD PRESSURE: 127 MMHG | HEART RATE: 76 BPM | WEIGHT: 230 LBS | DIASTOLIC BLOOD PRESSURE: 73 MMHG | OXYGEN SATURATION: 98 % | TEMPERATURE: 98.4 F | BODY MASS INDEX: 30.34 KG/M2

## 2023-10-05 DIAGNOSIS — L03.032 CELLULITIS OF TOE OF LEFT FOOT: ICD-10-CM

## 2023-10-05 DIAGNOSIS — E11.8 DIABETIC FOOT: ICD-10-CM

## 2023-10-05 DIAGNOSIS — E11.9 TYPE 2 DIABETES MELLITUS WITHOUT COMPLICATION, WITH LONG-TERM CURRENT USE OF INSULIN: Primary | ICD-10-CM

## 2023-10-05 DIAGNOSIS — Z79.4 TYPE 2 DIABETES MELLITUS WITHOUT COMPLICATION, WITH LONG-TERM CURRENT USE OF INSULIN: Primary | ICD-10-CM

## 2023-10-05 RX ORDER — CEPHALEXIN 500 MG/1
500 CAPSULE ORAL 3 TIMES DAILY
Qty: 30 CAPSULE | Refills: 0 | Status: SHIPPED | OUTPATIENT
Start: 2023-10-05 | End: 2023-10-15

## 2023-10-05 NOTE — PROGRESS NOTES
[facility] - PODIATRY    Today's Date: 10/05/23    Patient Name: Rohith Henley  MRN: [id number]  CSN: [id number]  PCP: Frankie Merritt MD, Last PCP Visit:  9/7/2022  Referring Provider: No ref. provider found    SUBJECTIVE     Chief Complaint   Patient presents with    Left Foot - Follow-up, Diabetes    Right Foot - Follow-up, Diabetes     He has some pain from his right knee that radiates down his leg     HPI: Rohith Henley, a 53 y.o.male, presents to clinic.    New, Established, New Problem: New problem  Location: Left great toe  Duration: Approximately 1 week ago.  Started when he was trimming his toenails and he states he cut too deep on the medial border.  He states this area has been red and has been applying OTC antibiotic to this area with a Band-Aid.  Aggravating factors:  Patient relates pain is aggravated by shoe gear and ambulation.    Patient denies any fevers, chills, nausea, vomiting, shortness of breathe, nor any other constitutional signs nor symptoms.    New, Established, New Problem: est    Duration:  >5 years    Onset:  incidious    Nature:  IDDM    Stable, worsening, improving:  stable    Aggravating factors:  Patient relates pain is aggravated by shoe gear and ambulation.      Previous Treatment: insulin pump    Patient states their most recent blood glucose reading was 213.      Patient denies any fevers, chills, nausea, vomiting, shortness of breath, nor any other constitutional signs nor symptoms.    Past Medical History:   Diagnosis Date    Arthritis     Cubital tunnel syndrome 09/03/2015    Diabetes mellitus, type 2     H/O knee surgery     2x - R    Hyperlipidemia     Lateral epicondylitis 09/03/2015    Type 2 diabetes mellitus      Past Surgical History:   Procedure Laterality Date    BACK SURGERY      CYST REMOVED    COLONOSCOPY  2022    NORMAL    CYST REMOVAL      KNEE SURGERY Right 01/30/2014    REPAIR     TOE SURGERY      IN GROWN TOENAIL     TRIGGER  FINGER RELEASE Right     MIDDLE FINGER      Family History   Problem Relation Age of Onset    Stroke Father     Hypertension Father     Diabetes Brother     Diabetes Brother     Hypertension Brother     Sleep apnea Brother     Hypertension Brother      Social History     Socioeconomic History    Marital status:    Tobacco Use    Smoking status: Never    Smokeless tobacco: Never   Vaping Use    Vaping Use: Never used   Substance and Sexual Activity    Alcohol use: No    Drug use: Never    Sexual activity: Defer     No Known Allergies  Current Outpatient Medications   Medication Sig Dispense Refill    aspirin 81 MG EC tablet Take 1 tablet by mouth Daily.      atorvastatin (LIPITOR) 80 MG tablet TAKE 1 TABLET EVERY NIGHT 90 tablet 0    cyclobenzaprine (FLEXERIL) 10 MG tablet Take 1 tablet by mouth At Night As Needed for Muscle Spasms. 30 tablet 1    diclofenac (VOLTAREN) 75 MG EC tablet Take 1 tablet by mouth 2 (Two) Times a Day. 180 tablet 3    glucose blood (RIKY CONTOUR NEXT TEST) test strip TESTING BS 4 X DAY  DX CODE E10 400 each 1    glucose blood test strip OneTouch Ultra Blue In Vitro Strip; Patient Sig: OneTouch Ultra Blue In Vitro Strip TESTING 3 TIMES DAILY; 3; 3; 13-Aug-2013; Active      ibuprofen (ADVIL,MOTRIN) 200 MG tablet ibuprofen      Insulin Aspart (novoLOG) 100 UNIT/ML injection INJECT VIA INSULIN PUMP. MAX DAILY DOSE  UNITS      Insulin Glargine (Lantus SoloStar) 100 UNIT/ML injection pen See Admin Instructions.      Insulin Pen Needle (PEN NEEDLES) 31G X 6 MM misc       Lancets (ONETOUCH ULTRASOFT) lancets 1 each by Other route.      cephalexin (Keflex) 500 MG capsule Take 1 capsule by mouth 3 (Three) Times a Day for 10 days. 30 capsule 0    lisinopril (PRINIVIL,ZESTRIL) 10 MG tablet Take 1 tablet by mouth Daily.       No current facility-administered medications for this visit.     Review of Systems   Constitutional: Negative.    Skin:         Infected left great toe and toenail    All other systems reviewed and are negative.    OBJECTIVE     Vitals:    10/05/23 0913   BP: 127/73   Pulse: 76   Temp: 98.4 °F (36.9 °C)   SpO2: 98%       WBC   Date Value Ref Range Status   06/22/2022 7.12 3.40 - 10.80 10*3/mm3 Final     RBC   Date Value Ref Range Status   06/22/2022 5.08 4.14 - 5.80 10*6/mm3 Final     Hemoglobin   Date Value Ref Range Status   06/22/2022 14.4 13.0 - 17.7 g/dL Final     Hematocrit   Date Value Ref Range Status   06/22/2022 43.5 37.5 - 51.0 % Final     MCV   Date Value Ref Range Status   06/22/2022 85.6 79.0 - 97.0 fL Final     MCH   Date Value Ref Range Status   06/22/2022 28.3 26.6 - 33.0 pg Final     MCHC   Date Value Ref Range Status   06/22/2022 33.1 31.5 - 35.7 g/dL Final     RDW   Date Value Ref Range Status   06/22/2022 12.9 12.3 - 15.4 % Final     RDW-SD   Date Value Ref Range Status   06/22/2022 40.7 37.0 - 54.0 fl Final     MPV   Date Value Ref Range Status   06/22/2022 10.9 6.0 - 12.0 fL Final     Platelets   Date Value Ref Range Status   06/22/2022 180 140 - 450 10*3/mm3 Final         Lab Results   Component Value Date    GLUCOSE 128 (H) 06/22/2022    BUN 17 06/22/2022    CREATININE 1.09 06/22/2022    EGFRIFNONA 65 07/09/2021    EGFRIFAFRI 101 12/13/2017    BCR 15.6 06/22/2022    K 4.3 06/22/2022    CO2 24.8 06/22/2022    CALCIUM 9.1 06/22/2022    PROTENTOTREF 7.4 12/13/2017    ALBUMIN 4.10 06/22/2022    LABIL2 1.4 10/19/2019    AST 20 06/22/2022    ALT 21 06/22/2022       Patient seen in no apparent distress.      PHYSICAL EXAM:     Foot/Ankle Exam    GENERAL  Diabetic foot exam performed    Orientation:  AAOx3  Affect:  appropriate  Gait:  unimpaired  Assistance:  independent  Right shoe gear: sandal  Left shoe gear: sandal    VASCULAR     Right Foot Vascularity   Normal vascular exam    Dorsalis pedis:  2+  Posterior tibial:  2+  Skin temperature:  warm  Edema grading:  None  CFT:  < 3 seconds  Pedal hair growth:  Present  Varicosities:  none     Left Foot  Vascularity   Normal vascular exam    Dorsalis pedis:  2+  Posterior tibial:  2+  Skin temperature:  warm  Edema grading:  None  CFT:  < 3 seconds  Pedal hair growth:  Present  Varicosities:  none     NEUROLOGIC     Right Foot Neurologic   Normal sensation    Light touch sensation: normal  Vibratory sensation: normal  Hot/Cold sensation: normal     Left Foot Neurologic   Normal sensation    Light touch sensation: normal  Vibratory sensation: normal  Hot/Cold sensation:  normal    MUSCULOSKELETAL     Right Foot Musculoskeletal   Hammertoe:  Third toe and second toe     Left Foot Musculoskeletal   Tenderness:  toe 1 tenderness    MUSCLE STRENGTH     Right Foot Muscle Strength   Foot dorsiflexion:  4  Foot plantar flexion:  4  Foot inversion:  4  Foot eversion:  4     Left Foot Muscle Strength   Foot dorsiflexion:  4  Foot plantar flexion:  4  Foot inversion:  4  Foot eversion:  4    RANGE OF MOTION     Right Foot Range of Motion   Foot and ankle ROM within normal limits       Left Foot Range of Motion   Foot and ankle ROM within normal limits      DERMATOLOGIC      Right Foot Dermatologic   Skin  Right foot skin is intact.      Left Foot Dermatologic   Skin  Positive for cellulitis and drainage.      Left foot additional comments: There is local edema and erythema with small amount of serous drainage at the distal medial aspect of the left great toe.  Slight tenderness to palpation.  No lymphangitis.  No abscess.  The nail is firmly attached.    Diabetic Foot Exam Performed and Monofilament Test Performed    ASSESSMENT/PLAN     Diagnoses and all orders for this visit:    1. Type 2 diabetes mellitus without complication, with long-term current use of insulin (Primary)    2. Diabetic foot    3. Cellulitis of toe of left foot  -     cephalexin (Keflex) 500 MG capsule; Take 1 capsule by mouth 3 (Three) Times a Day for 10 days.  Dispense: 30 capsule; Refill: 0    Comprehensive lower extremity examination and evaluation was  performed.    Discussed findings and treatment plan including risks, benefits, and treatment options with patient in detail. Patient agreed with treatment plan.    Medications and allergies reviewed.  Reviewed available lab values along with other pertinent labs.  These were discussed with the patient.    Left great toe and toenail: Cleanse with peroxide daily and apply over-the-counter antibiotic with a Band-Aid.  The patient states understanding and agreement with this plan.    Diabetic foot exam performed and documented this date, compliant with CQM required standards. Detail of findings as noted in physical exam.  Lower extremity Neurologic exam for diabetic patient performed and documented this date, compliant with PQRS required standards. Detail of findings as noted in physical exam.  Advised patient importance of good routine lower extremity hygiene. Advised patient importance of evaluating for intact skin and pain free nail borders.  Advised patient to use mirror to evaluate plantar/ soles of feet for better visualization. Advised patient monitor and phone office to be seen if any cracking to skin, open lesions, painful nail borders or if nails become elongated prior to next visit. Advised patient importance of daily cleansing of lower extremities, followed by good skin cream to maintain normal hydration of skin. Also advised patient importance of close daily monitoring of blood sugar. Advised to regulate diet and medications to maintain control of blood sugar in optimal range. Contact primary care provider if difficulties maintaining blood sugar levels.  Advised Patient of presence of Diabetes Mellitus condition.  Advised Patient risk of progression and worsening or improvement, then return of condition.  Will monitor condition for any change in future. Treat with most appropriate treatment pending status of condition.  Counseled and advised patient extensively on nature and ramifications of diabetes.  Standard instructions given to patient for good diabetic foot care and maintenance. Advised importance of careful monitoring to avoid break down and complications secondary to diabetes. Advised patient importance of strict maintenance of blood sugar control. Advised patient of possible ominous results from neglect of condition, i.e.: amputation/ loss of digits, feet and legs, or even death.  Patient states understands counseling, will monitor closely, continue good hygiene and routine diabetic foot care. Patient will contact office is questions or problems.      Patient is to monitor for recurrence and any new symptoms and to contact Dr. Morales's office for a follow-up appointment.      The patient states understanding and agreement with this plan.    An After Visit Summary was printed and given to the patient at discharge, including (if requested) any available informative/educational handouts regarding diagnosis, treatment, or medications. All questions were answered to patient/family satisfaction. Should symptoms fail to improve or worsen they agree to call or return to clinic or to go to the Emergency Department. Discussed the importance of following up with any needed screening tests/labs/specialist appointments and any requested follow-up recommended by me today. Importance of maintaining follow-up discussed and patient accepts that missed appointments can delay diagnosis and potentially lead to worsening of conditions.    Return in about 2 weeks (around 10/19/2023) for Podiatry Diabetic Foot Exam, Rx f/u., or sooner if acute issues arise.    This document has been electronically signed by Noman Morales DPM on October 5, 2023 09:55 EDT

## 2023-10-11 ENCOUNTER — OFFICE VISIT (OUTPATIENT)
Dept: PODIATRY | Facility: CLINIC | Age: 53
End: 2023-10-11
Payer: COMMERCIAL

## 2023-10-11 VITALS
SYSTOLIC BLOOD PRESSURE: 127 MMHG | DIASTOLIC BLOOD PRESSURE: 77 MMHG | BODY MASS INDEX: 30.48 KG/M2 | WEIGHT: 230 LBS | TEMPERATURE: 97.5 F | HEART RATE: 64 BPM | OXYGEN SATURATION: 97 % | HEIGHT: 73 IN

## 2023-10-11 DIAGNOSIS — E11.9 TYPE 2 DIABETES MELLITUS WITHOUT COMPLICATION, WITH LONG-TERM CURRENT USE OF INSULIN: Primary | ICD-10-CM

## 2023-10-11 DIAGNOSIS — E11.8 DIABETIC FOOT: ICD-10-CM

## 2023-10-11 DIAGNOSIS — L03.032 CELLULITIS OF TOE OF LEFT FOOT: ICD-10-CM

## 2023-10-11 DIAGNOSIS — Z79.4 TYPE 2 DIABETES MELLITUS WITHOUT COMPLICATION, WITH LONG-TERM CURRENT USE OF INSULIN: Primary | ICD-10-CM

## 2023-10-11 DIAGNOSIS — R21 RASH: ICD-10-CM

## 2023-10-11 RX ORDER — BETAMETHASONE DIPROPIONATE 0.5 MG/G
1 CREAM TOPICAL DAILY
Qty: 45 G | Refills: 2 | Status: SHIPPED | OUTPATIENT
Start: 2023-10-11

## 2023-10-11 NOTE — PROGRESS NOTES
[facility] - PODIATRY    Today's Date: 10/11/23    Patient Name: Rohith Henley  MRN: [id number]  CSN: [id number]  PCP: Frankie Merritt MD, Last PCP Visit:  9/7/2022  Referring Provider: No ref. provider found    SUBJECTIVE     Chief Complaint   Patient presents with    Left Foot - Follow-up     Cellulitis of toe      HPI: Rohith Henley, a 53 y.o.male, presents to clinic.    New, Established, New Problem: New problem  Location: Rash on left forefoot.  Insidious onset since last visit.    Stable, worsening, improving: Mild improvement  Aggravating factors:  Patient relates pain is aggravated by shoe gear and ambulation.    Previous Treatment:  Patient stopped taking antibiotics yesterday.    Patient states the infection now area has essentially resolved.    New, Established, New Problem: est    Duration:  >5 years    Onset:  incidious    Nature:  IDDM    Stable, worsening, improving:  stable    Aggravating factors:  Patient relates pain is aggravated by shoe gear and ambulation.      Previous Treatment: insulin pump    Patient states their most recent blood glucose reading was 238.      Patient denies any fevers, chills, nausea, vomiting, shortness of breath, nor any other constitutional signs nor symptoms.    Past Medical History:   Diagnosis Date    Arthritis     Cubital tunnel syndrome 09/03/2015    Diabetes mellitus, type 2     H/O knee surgery     2x - R    Hyperlipidemia     Lateral epicondylitis 09/03/2015    Type 2 diabetes mellitus      Past Surgical History:   Procedure Laterality Date    BACK SURGERY      CYST REMOVED    COLONOSCOPY  2022    NORMAL    CYST REMOVAL      KNEE SURGERY Right 01/30/2014    REPAIR     TOE SURGERY      IN GROWN TOENAIL     TRIGGER FINGER RELEASE Right     MIDDLE FINGER      Family History   Problem Relation Age of Onset    Stroke Father     Hypertension Father     Diabetes Brother     Diabetes Brother     Hypertension Brother     Sleep apnea Brother      Hypertension Brother      Social History     Socioeconomic History    Marital status:    Tobacco Use    Smoking status: Never    Smokeless tobacco: Never   Vaping Use    Vaping Use: Never used   Substance and Sexual Activity    Alcohol use: No    Drug use: Never    Sexual activity: Defer     No Known Allergies  Current Outpatient Medications   Medication Sig Dispense Refill    aspirin 81 MG EC tablet Take 1 tablet by mouth Daily.      atorvastatin (LIPITOR) 80 MG tablet TAKE 1 TABLET EVERY NIGHT 90 tablet 0    cyclobenzaprine (FLEXERIL) 10 MG tablet Take 1 tablet by mouth At Night As Needed for Muscle Spasms. 30 tablet 1    diclofenac (VOLTAREN) 75 MG EC tablet Take 1 tablet by mouth 2 (Two) Times a Day. 180 tablet 3    glucose blood (RIKY CONTOUR NEXT TEST) test strip TESTING BS 4 X DAY  DX CODE E10 400 each 1    glucose blood test strip OneTouch Ultra Blue In Vitro Strip; Patient Sig: OneTouch Ultra Blue In Vitro Strip TESTING 3 TIMES DAILY; 3; 3; 13-Aug-2013; Active      ibuprofen (ADVIL,MOTRIN) 200 MG tablet ibuprofen      Insulin Aspart (novoLOG) 100 UNIT/ML injection INJECT VIA INSULIN PUMP. MAX DAILY DOSE  UNITS      Insulin Glargine (Lantus SoloStar) 100 UNIT/ML injection pen See Admin Instructions.      Insulin Pen Needle (PEN NEEDLES) 31G X 6 MM misc       Lancets (ONETOUCH ULTRASOFT) lancets 1 each by Other route.      betamethasone dipropionate 0.05 % cream Apply 1 application  topically to the appropriate area as directed Daily. Apply to affected area once daily.  Discontinue when resolved. 45 g 2    cephalexin (Keflex) 500 MG capsule Take 1 capsule by mouth 3 (Three) Times a Day for 10 days. (Patient not taking: Reported on 10/11/2023) 30 capsule 0    lisinopril (PRINIVIL,ZESTRIL) 10 MG tablet Take 1 tablet by mouth Daily.       No current facility-administered medications for this visit.     Review of Systems   Constitutional: Negative.    Skin:         Infected left great toe and  toenail; new rash   All other systems reviewed and are negative.      OBJECTIVE     Vitals:    10/11/23 1510   BP: 127/77   Pulse: 64   Temp: 97.5 øF (36.4 øC)   SpO2: 97%       WBC   Date Value Ref Range Status   06/22/2022 7.12 3.40 - 10.80 10*3/mm3 Final     RBC   Date Value Ref Range Status   06/22/2022 5.08 4.14 - 5.80 10*6/mm3 Final     Hemoglobin   Date Value Ref Range Status   06/22/2022 14.4 13.0 - 17.7 g/dL Final     Hematocrit   Date Value Ref Range Status   06/22/2022 43.5 37.5 - 51.0 % Final     MCV   Date Value Ref Range Status   06/22/2022 85.6 79.0 - 97.0 fL Final     MCH   Date Value Ref Range Status   06/22/2022 28.3 26.6 - 33.0 pg Final     MCHC   Date Value Ref Range Status   06/22/2022 33.1 31.5 - 35.7 g/dL Final     RDW   Date Value Ref Range Status   06/22/2022 12.9 12.3 - 15.4 % Final     RDW-SD   Date Value Ref Range Status   06/22/2022 40.7 37.0 - 54.0 fl Final     MPV   Date Value Ref Range Status   06/22/2022 10.9 6.0 - 12.0 fL Final     Platelets   Date Value Ref Range Status   06/22/2022 180 140 - 450 10*3/mm3 Final         Lab Results   Component Value Date    GLUCOSE 128 (H) 06/22/2022    BUN 17 06/22/2022    CREATININE 1.09 06/22/2022    EGFRIFNONA 65 07/09/2021    EGFRIFAFRI 101 12/13/2017    BCR 15.6 06/22/2022    K 4.3 06/22/2022    CO2 24.8 06/22/2022    CALCIUM 9.1 06/22/2022    PROTENTOTREF 7.4 12/13/2017    ALBUMIN 4.10 06/22/2022    LABIL2 1.4 10/19/2019    AST 20 06/22/2022    ALT 21 06/22/2022       Patient seen in no apparent distress.      PHYSICAL EXAM:     Foot/Ankle Exam    GENERAL  Diabetic foot exam performed    Appearance:  appears stated age  Orientation:  AAOx3  Affect:  appropriate  Gait:  unimpaired  Assistance:  independent  Right shoe gear: sandal  Left shoe gear: sandal    VASCULAR     Right Foot Vascularity   Normal vascular exam    Dorsalis pedis:  2+  Posterior tibial:  2+  Skin temperature:  warm  Edema grading:  None  CFT:  < 3 seconds  Pedal hair  growth:  Present  Varicosities:  none     Left Foot Vascularity   Normal vascular exam    Dorsalis pedis:  2+  Posterior tibial:  2+  Skin temperature:  warm  Edema grading:  None  CFT:  < 3 seconds  Pedal hair growth:  Present  Varicosities:  none     NEUROLOGIC     Right Foot Neurologic   Normal sensation    Light touch sensation: normal  Vibratory sensation: normal  Hot/Cold sensation: normal     Left Foot Neurologic   Normal sensation    Light touch sensation: normal  Vibratory sensation: normal  Hot/Cold sensation:  normal    MUSCULOSKELETAL     Right Foot Musculoskeletal   Hammertoe:  Third toe and second toe     Left Foot Musculoskeletal   Tenderness:  (No tenderness to the left great toe)    MUSCLE STRENGTH     Right Foot Muscle Strength   Foot dorsiflexion:  4  Foot plantar flexion:  4  Foot inversion:  4  Foot eversion:  4     Left Foot Muscle Strength   Foot dorsiflexion:  4  Foot plantar flexion:  4  Foot inversion:  4  Foot eversion:  4    RANGE OF MOTION     Right Foot Range of Motion   Foot and ankle ROM within normal limits       Left Foot Range of Motion   Foot and ankle ROM within normal limits      DERMATOLOGIC      Right Foot Dermatologic   Skin  Right foot skin is intact.      Left Foot Dermatologic   Skin  Negative for cellulitis and drainage.      Left foot additional comments: Edema and erythema resolved on the left great toenail.  Patient has developed a rash on his lesser toes that stops immediately at the metatarsal phalangeal joint area.  No signs of edema, erythema, lymphangitis, nor signs of infection.      Diabetic Foot Exam Performed and Monofilament Test Performed    ASSESSMENT/PLAN     Diagnoses and all orders for this visit:    1. Type 2 diabetes mellitus without complication, with long-term current use of insulin (Primary)    2. Diabetic foot    3. Cellulitis of toe of left foot    4. Rash  -     betamethasone dipropionate 0.05 % cream; Apply 1 application  topically to the  appropriate area as directed Daily. Apply to affected area once daily.  Discontinue when resolved.  Dispense: 45 g; Refill: 2    Comprehensive lower extremity examination and evaluation was performed.    Discussed findings and treatment plan including risks, benefits, and treatment options with patient in detail. Patient agreed with treatment plan.    Medications and allergies reviewed.  Reviewed available lab values along with other pertinent labs.  These were discussed with the patient.    Diabetic foot exam performed and documented this date, compliant with CQM required standards. Detail of findings as noted in physical exam.  Lower extremity Neurologic exam for diabetic patient performed and documented this date, compliant with PQRS required standards. Detail of findings as noted in physical exam.  Advised patient importance of good routine lower extremity hygiene. Advised patient importance of evaluating for intact skin and pain free nail borders.  Advised patient to use mirror to evaluate plantar/ soles of feet for better visualization. Advised patient monitor and phone office to be seen if any cracking to skin, open lesions, painful nail borders or if nails become elongated prior to next visit. Advised patient importance of daily cleansing of lower extremities, followed by good skin cream to maintain normal hydration of skin. Also advised patient importance of close daily monitoring of blood sugar. Advised to regulate diet and medications to maintain control of blood sugar in optimal range. Contact primary care provider if difficulties maintaining blood sugar levels.  Advised Patient of presence of Diabetes Mellitus condition.  Advised Patient risk of progression and worsening or improvement, then return of condition.  Will monitor condition for any change in future. Treat with most appropriate treatment pending status of condition.  Counseled and advised patient extensively on nature and ramifications of  diabetes. Standard instructions given to patient for good diabetic foot care and maintenance. Advised importance of careful monitoring to avoid break down and complications secondary to diabetes. Advised patient importance of strict maintenance of blood sugar control. Advised patient of possible ominous results from neglect of condition, i.e.: amputation/ loss of digits, feet and legs, or even death.  Patient states understands counseling, will monitor closely, continue good hygiene and routine diabetic foot care. Patient will contact office is questions or problems.      Patient is to monitor for recurrence and any new symptoms and to contact Dr. Morales's office for a follow-up appointment.      The patient states understanding and agreement with this plan.    An After Visit Summary was printed and given to the patient at discharge, including (if requested) any available informative/educational handouts regarding diagnosis, treatment, or medications. All questions were answered to patient/family satisfaction. Should symptoms fail to improve or worsen they agree to call or return to clinic or to go to the Emergency Department. Discussed the importance of following up with any needed screening tests/labs/specialist appointments and any requested follow-up recommended by me today. Importance of maintaining follow-up discussed and patient accepts that missed appointments can delay diagnosis and potentially lead to worsening of conditions.    Return in about 1 year (around 10/11/2024) for Podiatry Diabetic Foot Exam., or sooner if acute issues arise.    This document has been electronically signed by Noman Morales DPM on October 11, 2023 15:35 EDT

## 2023-10-12 ENCOUNTER — OFFICE VISIT (OUTPATIENT)
Dept: FAMILY MEDICINE CLINIC | Age: 53
End: 2023-10-12
Payer: COMMERCIAL

## 2023-10-12 VITALS
HEIGHT: 73 IN | BODY MASS INDEX: 31.01 KG/M2 | OXYGEN SATURATION: 98 % | HEART RATE: 65 BPM | DIASTOLIC BLOOD PRESSURE: 68 MMHG | SYSTOLIC BLOOD PRESSURE: 132 MMHG | WEIGHT: 234 LBS

## 2023-10-12 DIAGNOSIS — G89.29 BILATERAL CHRONIC KNEE PAIN: Primary | ICD-10-CM

## 2023-10-12 DIAGNOSIS — M25.562 BILATERAL CHRONIC KNEE PAIN: Primary | ICD-10-CM

## 2023-10-12 DIAGNOSIS — M25.561 BILATERAL CHRONIC KNEE PAIN: Primary | ICD-10-CM

## 2023-10-12 NOTE — PROGRESS NOTES
"  Diagnoses and all orders for this visit:    1. Bilateral chronic knee pain (Primary)  Comments:  Possible Baker's cyst? Encouraged to obtain braces. Referred to Ortho and physical therapy. Ok to use Voltaren and tylenol.  Orders:  -     Ambulatory Referral to Physical Therapy Evaluate and treat  -     Ambulatory Referral to Orthopedic Surgery            Subjective     CHIEF COMPLAINT    Chief Complaint   Patient presents with    Knee Pain     Pt c/o bilateral knee pain with swelling in right knee with radiating pain down lower extremity for 1 month with swelling in the last 1.5 weeks             History of Present Illness  This is a 53 year old male who presents with bilateral knee pain and right knee swelling for the last month. He reports a PMH of right knee injury with 2 past surgeries, last being in 2017 with Dr. Franklin. He denies any specific injury or event. He works for the [employer] and states he can walk up to 30,000 steps per day. He states he has chronic pain that is usually tolerable but over the last 10 days has had an increase in pain and swelling. He states the pain goes across the back of his knees and \"feels like a rubber band stretching tight\". He reports that when he is walking at times his knee \"will give out\". He has been taking Voltaren and ibuprofen with minimal relief. He states ice and heat help decrease swelling. He has not used any bracing or compression wraps. He denies any numbness, tingling, or color change to lower extremities.             Review of Systems   Constitutional:  Negative for chills and fever.   Musculoskeletal:  Positive for arthralgias, joint swelling and myalgias. Negative for gait problem.   Skin:  Negative for color change and wound.   Neurological:  Negative for weakness and numbness.            Past Medical History:   Diagnosis Date    Arthritis     Cubital tunnel syndrome 09/03/2015    Diabetes mellitus, type 2     H/O knee surgery     2x - R    Hyperlipidemia  "    Lateral epicondylitis 09/03/2015    Type 2 diabetes mellitus             Past Surgical History:   Procedure Laterality Date    BACK SURGERY      CYST REMOVED    COLONOSCOPY  2022    NORMAL    CYST REMOVAL      KNEE SURGERY Right 01/30/2014    REPAIR     TOE SURGERY      IN GROWN TOENAIL     TRIGGER FINGER RELEASE Right     MIDDLE FINGER             Family History   Problem Relation Age of Onset    Stroke Father     Hypertension Father     Diabetes Brother     Diabetes Brother     Hypertension Brother     Sleep apnea Brother     Hypertension Brother             Social History     Socioeconomic History    Marital status:    Tobacco Use    Smoking status: Never    Smokeless tobacco: Never   Vaping Use    Vaping Use: Never used   Substance and Sexual Activity    Alcohol use: No    Drug use: Never    Sexual activity: Defer            No Known Allergies         Current Outpatient Medications on File Prior to Visit   Medication Sig Dispense Refill    aspirin 81 MG EC tablet Take 1 tablet by mouth Daily.      atorvastatin (LIPITOR) 80 MG tablet TAKE 1 TABLET EVERY NIGHT 90 tablet 0    betamethasone dipropionate 0.05 % cream Apply 1 application  topically to the appropriate area as directed Daily. Apply to affected area once daily.  Discontinue when resolved. 45 g 2    cyclobenzaprine (FLEXERIL) 10 MG tablet Take 1 tablet by mouth At Night As Needed for Muscle Spasms. 30 tablet 1    diclofenac (VOLTAREN) 75 MG EC tablet Take 1 tablet by mouth 2 (Two) Times a Day. 180 tablet 3    glucose blood (RIKY CONTOUR NEXT TEST) test strip TESTING BS 4 X DAY  DX CODE E10 400 each 1    glucose blood test strip OneTouch Ultra Blue In Vitro Strip; Patient Sig: OneTouch Ultra Blue In Vitro Strip TESTING 3 TIMES DAILY; 3; 3; 13-Aug-2013; Active      ibuprofen (ADVIL,MOTRIN) 200 MG tablet ibuprofen      Insulin Aspart (novoLOG) 100 UNIT/ML injection INJECT VIA INSULIN PUMP. MAX DAILY DOSE  UNITS      Insulin Glargine (Lantus  "SoloStar) 100 UNIT/ML injection pen See Admin Instructions.      Insulin Pen Needle (PEN NEEDLES) 31G X 6 MM misc       Lancets (ONETOUCH ULTRASOFT) lancets 1 each by Other route.      lisinopril (PRINIVIL,ZESTRIL) 10 MG tablet Take 1 tablet by mouth Daily.      [DISCONTINUED] cephalexin (Keflex) 500 MG capsule Take 1 capsule by mouth 3 (Three) Times a Day for 10 days. 30 capsule 0     No current facility-administered medications on file prior to visit.            /68 (BP Location: Left arm, Patient Position: Sitting, Cuff Size: Large Adult)   Pulse 65   Ht 185.4 cm (73\")   Wt 106 kg (234 lb)   SpO2 98%   BMI 30.87 kg/mý          Objective     Physical Exam  Vitals and nursing note reviewed.   Constitutional:       General: He is not in acute distress.     Appearance: Normal appearance.   HENT:      Head: Normocephalic and atraumatic.   Cardiovascular:      Rate and Rhythm: Normal rate and regular rhythm.      Heart sounds: Normal heart sounds.   Pulmonary:      Effort: Pulmonary effort is normal. No respiratory distress.      Breath sounds: Normal breath sounds.   Musculoskeletal:      Right knee: Swelling (mild) present. No deformity, erythema or bony tenderness. Normal range of motion. Tenderness present.      Instability Tests: Anterior drawer test negative. Posterior drawer test negative.      Left knee: No swelling, deformity, erythema or bony tenderness. Normal range of motion. Tenderness present.      Instability Tests: Anterior drawer test negative. Posterior drawer test negative.   Skin:     General: Skin is warm and dry.      Capillary Refill: Capillary refill takes less than 2 seconds.   Neurological:      General: No focal deficit present.      Mental Status: He is alert and oriented to person, place, and time.   Psychiatric:         Mood and Affect: Mood normal.         Behavior: Behavior normal.                    Diagnoses and all orders for this visit:    1. Bilateral chronic knee pain " (Primary)  Comments:  Possible Baker's cyst? Encouraged to obtain braces. Referred to Ortho and physical therapy. Ok to use Voltaren and tylenol.  Orders:  -     Ambulatory Referral to Physical Therapy Evaluate and treat  -     Ambulatory Referral to Orthopedic Surgery             Additional Instructions for the Follow-ups that You Need to Schedule       Ambulatory Referral to Physical Therapy Evaluate and treat   As directed      Specialty needed: Evaluate and treat   Follow-up needed: Yes                          FOR FULL DISCHARGE INSTRUCTIONS/COMMENTS/HANDOUTS please see the   AVS

## 2023-11-13 ENCOUNTER — TREATMENT (OUTPATIENT)
Dept: PHYSICAL THERAPY | Facility: CLINIC | Age: 53
End: 2023-11-13
Payer: COMMERCIAL

## 2023-11-13 DIAGNOSIS — R29.898 WEAKNESS OF BOTH HIPS: ICD-10-CM

## 2023-11-13 DIAGNOSIS — M25.562 CHRONIC PAIN OF BOTH KNEES: Primary | ICD-10-CM

## 2023-11-13 DIAGNOSIS — M25.561 CHRONIC PAIN OF BOTH KNEES: Primary | ICD-10-CM

## 2023-11-13 DIAGNOSIS — G89.29 CHRONIC PAIN OF BOTH KNEES: Primary | ICD-10-CM

## 2023-11-13 PROCEDURE — 97112 NEUROMUSCULAR REEDUCATION: CPT | Performed by: PHYSICAL THERAPIST

## 2023-11-13 PROCEDURE — 97161 PT EVAL LOW COMPLEX 20 MIN: CPT | Performed by: PHYSICAL THERAPIST

## 2023-11-13 PROCEDURE — 97110 THERAPEUTIC EXERCISES: CPT | Performed by: PHYSICAL THERAPIST

## 2023-11-13 NOTE — PROGRESS NOTES
Physical Therapy Initial Evaluation and Plan of Care      Patient: Rohith Henley   : 1970  Diagnosis/ICD-10 Code:  Chronic pain of both knees [M25.561, M25.562, G89.29]  Referring practitioner: Christine Myrick PA-C  Date of Initial Visit: 2023  Today's Date: 2023  Patient seen for 1 sessions         Visit Diagnoses:    ICD-10-CM ICD-9-CM   1. Chronic pain of both knees  M25.561 719.46    M25.562 338.29    G89.29    2. Weakness of both hips  R29.898 729.89       Subjective Evaluation    History of Present Illness  Mechanism of injury: Mr. Henley come to [facility] with complaints of B knee pain.   This pain has been going on 4-5 months, wear and tear overtime.  He was getting pain/rubber band tightness in the back of the knees, having trouble walking.  He was scared to walk too much, felt like something was going to blow out.    He has swelling in the R Knee, more medially.  He has had two previous R knee surgeries (mensicus, cartilage removal).  He has not had nay L knee surgeries.     He sees ortho on 23.     He works at the rail yard - climb up/down railcars, walk, getting in/out cars, stairs.  His work day varies, he worked 81 hrs last week.      R knee pain:  3/10 at rest, dull ache on the medial joint line  8/10 at worst  L knee pain:  1/10 at rest  8/10 at worst    Pain  Quality: dull ache  Relieving factors: medications, ice and rest  Aggravating factors: standing, stairs, lifting, prolonged positioning, repetitive movement and squatting    Social Support  Lives in: one-story house  Lives with: spouse and young children    Treatments  Previous treatment: physical therapy  Patient Goals  Patient goals for therapy: decreased edema, decreased pain, improved balance, increased strength, independence with ADLs/IADLs and return to sport/leisure activities             Objective          Active Range of Motion   Left Knee   Flexion: 120 degrees   Extension: 0 degrees     Right Knee   Flexion:  120 degrees   Extension: 0 degrees     Strength/Myotome Testing     Left Hip   Planes of Motion   Flexion: 4  Abduction: 4  Adduction: 4-    Right Hip   Planes of Motion   Flexion: 4-  Abduction: 4  Adduction: 4    Left Knee   Flexion: 4  Extension: 4-    Right Knee   Flexion: 4-  Extension: 4-     General Comments     Knee Comments  WB Status: WBAT and patient is not using an assistive device    Swelling: medial R knee joint line    Radicular Symptoms: none noted    Tenderness: R medial and lateral joint line, L medial joint line, patella, R/L hamstrings    Gait: decreased WB on the RLE, shorter stance time on the L         Assessment & Plan       Assessment  Impairments: abnormal gait, abnormal or restricted ROM, activity intolerance, impaired balance, impaired physical strength, lacks appropriate home exercise program, pain with function and safety issue   Functional limitations: sleeping, walking, uncomfortable because of pain, standing and stooping   Assessment details: Pt presents with limitations, noted below, that impede his ability to walk long distances, stair navigation, perform repetitive movements, perform hobbies/recreational activities, and squatting. The skills of a therapist will be required to safely and effectively implement the following treatment plan to restore maximal level of function.       Goals  Plan Goals: KNEE PROBLEMS:   1. Mobility: Walking/Moving Around Functional Limitation     LTG 1: 12 weeks:  The patient will demonstrate decreased limitation by achieving a score of 66/80 on the LEFS.    STATUS:  New   STG 1 a: 4 weeks:  The patient will demonstrate decreased limitation by achieving a score of 56/80 on the LEFS.      STATUS:  New   TREATMENT:  Manual therapy, therapeutic exercise, home exercise instruction, and modalities as needed.2. The patient has limited strength of the B knee.     LTG 2: 12 weeks: The patient will demonstrate 5 /5 strength for B knee flexion and extension in  order to allow patient improved joint stability    STATUS:  New   STG 2a: 4 weeks: The patient will demonstrate 4+ /5 strength for B knee flexion and extension    STATUS:  New   STG2b:  4 weeks:  The patient will be independent with home exercises.     STATUS:  New   TREATMENT: Manual therapy, therapeutic exercise, home exercise instruction, aquatic therapy, and modalities as needed to include:  moist heat, electrical stimulation, ultrasound, and ice.     3. The patient has gait dysfunction.   LTG 3: 12 weeks:  The patient will ambulate without assistive device, independently, for community distances with minimal limp to the right lower extremity in order to improve mobility and allow patient to perform activities such as grocery shopping with greater ease.    STATUS:  New   TREATMENT: Gait training, aquatic therapy, therapeutic exercise, and home exercise instruction.        Plan  Therapy options: will be seen for skilled therapy services  Planned modality interventions: cryotherapy, dry needling, TENS, thermotherapy (hydrocollator packs), ultrasound and electrical stimulation/Russian stimulation  Planned therapy interventions: manual therapy, balance/weight-bearing training, ADL retraining, flexibility, functional ROM exercises, gait training, home exercise program, joint mobilization, neuromuscular re-education, postural training, soft tissue mobilization, strengthening, stretching and therapeutic activities  Frequency: 2x week  Duration in weeks: 12  Treatment plan discussed with: patient  Plan details: Review HEP, update as needed.    Progress with B knee and BLE strength, trunk control/postural awareness, balance and gait training, coordination, increased stamina, decreased tightness, improved ROM/flexibility, education as needed.          History # of Personal Factors and/or Comorbidities: MODERATE (1-2)  Examination of Body System(s): # of elements: LOW (1-2)  Clinical Presentation: STABLE   Clinical  Decision Making: LOW     Timed:  Manual Therapy:         mins  53157;  Therapeutic Exercise:    8     mins  60237;     Neuromuscular Mamie:    8    mins  92960;    Therapeutic Activity:          mins  75690;     Gait Training:           mins  28367;     Ultrasound:          mins  23135;    Canalith Repos           ___  mins  51421      Untimed:  Electrical Stimulation:         mins  53622 ( );  Mechanical Traction:         mins  12518;   Dry Needling:                     mins self pay  Low Eval:                      30     mins  09497;  Medium Eval:                     mins  74115;   High Eval:                          mins  46915       Timed Treatment:   16   mins   Total Treatment:     46   mins    PT SIGNATURE: Eym Cordova PT, DPT  [place] License: [id number]  Electronically signed by Emy Cordova PT, 11/13/23, 10:24 AM EST      Initial Certification    Certification Period: 11/13/2023 thru 2/10/2024  I certify that the therapy services are furnished while this patient is under my care.  The services outlined above are required by this patient, and will be reviewed every 90 days.     PHYSICIAN: Christine Myrick PA-C  NPI: [id number]            PHYSICIAN PRINT NAME: ______________________________________________      PHYSICIAN SIGNATURE: ______________________________________________         DATE:________________________________        Please sign and return via fax to [phone].  Thank you, [facility].

## 2023-11-17 ENCOUNTER — TREATMENT (OUTPATIENT)
Dept: PHYSICAL THERAPY | Facility: CLINIC | Age: 53
End: 2023-11-17
Payer: COMMERCIAL

## 2023-11-17 DIAGNOSIS — R29.898 WEAKNESS OF BOTH HIPS: ICD-10-CM

## 2023-11-17 DIAGNOSIS — M25.561 CHRONIC PAIN OF BOTH KNEES: Primary | ICD-10-CM

## 2023-11-17 DIAGNOSIS — M25.562 CHRONIC PAIN OF BOTH KNEES: Primary | ICD-10-CM

## 2023-11-17 DIAGNOSIS — G89.29 CHRONIC PAIN OF BOTH KNEES: Primary | ICD-10-CM

## 2023-11-17 PROCEDURE — 97110 THERAPEUTIC EXERCISES: CPT | Performed by: PHYSICAL THERAPIST

## 2023-11-17 PROCEDURE — 97530 THERAPEUTIC ACTIVITIES: CPT | Performed by: PHYSICAL THERAPIST

## 2023-11-17 NOTE — PROGRESS NOTES
Physical Therapy Daily Treatment Note      Patient: Rohith Henley   : 1970  Referring practitioner: Christine Myrick PA-C  Date of Initial Visit: Type: THERAPY  Noted: 2023  Today's Date: 2023  Patient seen for 2 sessions           Subjective Evaluation    History of Present Illness    Subjective comment: 4/10       Objective   See Exercise, Manual, and Modality Logs for complete treatment.       Assessment & Plan       Assessment  Impairments: abnormal gait, abnormal or restricted ROM, activity intolerance, impaired balance, impaired physical strength, lacks appropriate home exercise program, pain with function and safety issue   Functional limitations: sleeping, walking, uncomfortable because of pain, standing and stooping   Assessment details: Pt required vc and demonstration of all exercises due to this being his first tx visit. Pt reports that his knees hurt to touch the medial aspect. With exercises R LE appears weaker then the L and Pt agrees with this, however, he states his balance seems worse on the L. Pt reports doing his HEP on a regular basis.    Goals  Plan Goals: KNEE PROBLEMS:   1. Mobility: Walking/Moving Around Functional Limitation     LTG 1: 12 weeks:  The patient will demonstrate decreased limitation by achieving a score of 66/80 on the LEFS.    STATUS:  Progressing   STG 1 a: 4 weeks:  The patient will demonstrate decreased limitation by achieving a score of 56/80 on the LEFS.      STATUS:  Progressing   TREATMENT:  Manual therapy, therapeutic exercise, home exercise instruction, and modalities as needed.2. The patient has limited strength of the B knee.     LTG 2: 12 weeks: The patient will demonstrate 5 /5 strength for B knee flexion and extension in order to allow patient improved joint stability    STATUS:  Progressing   STG 2a: 4 weeks: The patient will demonstrate 4+ /5 strength for B knee flexion and extension    STATUS:  Progressing   STG2b:  4 weeks:  The  patient will be independent with home exercises.     STATUS:  Progressing   TREATMENT: Manual therapy, therapeutic exercise, home exercise instruction, aquatic therapy, and modalities as needed to include:  moist heat, electrical stimulation, ultrasound, and ice.     3. The patient has gait dysfunction.   LTG 3: 12 weeks:  The patient will ambulate without assistive device, independently, for community distances with minimal limp to the right lower extremity in order to improve mobility and allow patient to perform activities such as grocery shopping with greater ease.    STATUS:  Progressing   TREATMENT: Gait training, aquatic therapy, therapeutic exercise, and home exercise instruction.        Plan  Therapy options: will be seen for skilled therapy services  Planned modality interventions: cryotherapy, dry needling, TENS, thermotherapy (hydrocollator packs), ultrasound and electrical stimulation/Russian stimulation  Planned therapy interventions: manual therapy, balance/weight-bearing training, ADL retraining, flexibility, functional ROM exercises, gait training, home exercise program, joint mobilization, neuromuscular re-education, postural training, soft tissue mobilization, strengthening, stretching and therapeutic activities  Frequency: 2x week  Duration in weeks: 12  Treatment plan discussed with: patient          Visit Diagnoses:    ICD-10-CM ICD-9-CM   1. Chronic pain of both knees  M25.561 719.46    M25.562 338.29    G89.29    2. Weakness of both hips  R29.898 729.89       Progress per Plan of Care    Timed:    Therapeutic Exercise:    15     mins  86696;  Manual Therapy:         mins  21335;     Neuromuscular Mamie:       mins  89990;    Therapeutic Activity:     15     mins  29400;     Gait Training:           mins  88398;     Ultrasound:          mins  77676;      Untimed:  Electrical Stimulation:         mins  29102 ( );  Mechanical Traction:         mins  04609;     Timed Treatment:   30   mins    Total Treatment:     32   mins      Leigha Diallo PTA  Physical Therapist Assistant

## 2023-12-01 ENCOUNTER — TREATMENT (OUTPATIENT)
Dept: PHYSICAL THERAPY | Facility: CLINIC | Age: 53
End: 2023-12-01
Payer: COMMERCIAL

## 2023-12-01 DIAGNOSIS — M25.561 CHRONIC PAIN OF BOTH KNEES: Primary | ICD-10-CM

## 2023-12-01 DIAGNOSIS — R29.898 WEAKNESS OF BOTH HIPS: ICD-10-CM

## 2023-12-01 DIAGNOSIS — M25.562 CHRONIC PAIN OF BOTH KNEES: Primary | ICD-10-CM

## 2023-12-01 DIAGNOSIS — G89.29 CHRONIC PAIN OF BOTH KNEES: Primary | ICD-10-CM

## 2023-12-01 PROCEDURE — 97110 THERAPEUTIC EXERCISES: CPT | Performed by: PHYSICAL THERAPIST

## 2023-12-01 PROCEDURE — 97530 THERAPEUTIC ACTIVITIES: CPT | Performed by: PHYSICAL THERAPIST

## 2023-12-01 NOTE — PROGRESS NOTES
Physical Therapy Daily Treatment Note    Patient: Rohith Henley   : 1970  Diagnosis/ICD-10 Code:  Chronic pain of both knees [M25.561, M25.562, G89.29]  Referring practitioner: Christine Myrick PA-C  Date of Initial Visit: Type: THERAPY  Noted: 2023  Today's Date: 2023  Patient seen for 3 sessions             Subjective Evaluation    History of Present Illness    Subjective comment: Patient reports he did work today so his knees are achy. Patient states his L knee pain is 6-7/10 and the R knee pain is 2-3/10. Patient states his right knee is swollen and feels tight. Patient states he does side steps and monster walks at work at least, he tries to do approximately 270 ft minimum.       Objective     See Exercise, Manual, and Modality Logs for complete treatment.     Assessment & Plan       Assessment  Impairments: abnormal gait, abnormal or restricted ROM, activity intolerance, impaired balance, impaired physical strength, lacks appropriate home exercise program, pain with function and safety issue   Functional limitations: sleeping, walking, uncomfortable because of pain, standing and stooping   Assessment details: Patients knee was locking into extension with leg press and required cuing to slow the motion to allow quad to control the motion. Knee extension was more controlled with TKE's. Pt would benefit from skilled PT to address Range of Motion  and Strength deficits, pain management and any concerns with ADLs.     Goals  Plan Goals: KNEE PROBLEMS:   1. Mobility: Walking/Moving Around Functional Limitation     LTG 1: 12 weeks:  The patient will demonstrate decreased limitation by achieving a score of 66/80 on the LEFS.    STATUS:  Progressing   STG 1 a: 4 weeks:  The patient will demonstrate decreased limitation by achieving a score of 56/80 on the LEFS.      STATUS:  Progressing   TREATMENT:  Manual therapy, therapeutic exercise, home exercise instruction, and modalities as needed.2. The  patient has limited strength of the B knee.     LTG 2: 12 weeks: The patient will demonstrate 5 /5 strength for B knee flexion and extension in order to allow patient improved joint stability    STATUS:  Progressing   STG 2a: 4 weeks: The patient will demonstrate 4+ /5 strength for B knee flexion and extension    STATUS:  Progressing   STG2b:  4 weeks:  The patient will be independent with home exercises.     STATUS:  Progressing   TREATMENT: Manual therapy, therapeutic exercise, home exercise instruction, aquatic therapy, and modalities as needed to include:  moist heat, electrical stimulation, ultrasound, and ice.     3. The patient has gait dysfunction.   LTG 3: 12 weeks:  The patient will ambulate without assistive device, independently, for community distances with minimal limp to the right lower extremity in order to improve mobility and allow patient to perform activities such as grocery shopping with greater ease.    STATUS:  Progressing   TREATMENT: Gait training, aquatic therapy, therapeutic exercise, and home exercise instruction.        Plan  Therapy options: will be seen for skilled therapy services  Planned modality interventions: cryotherapy, dry needling, TENS, thermotherapy (hydrocollator packs), ultrasound and electrical stimulation/Russian stimulation  Planned therapy interventions: manual therapy, balance/weight-bearing training, ADL retraining, flexibility, functional ROM exercises, gait training, home exercise program, joint mobilization, neuromuscular re-education, postural training, soft tissue mobilization, strengthening, stretching and therapeutic activities  Frequency: 2x week  Duration in weeks: 12  Treatment plan discussed with: patient      Progress per Plan of Care      Timed:  Manual Therapy:    0     mins  00244;  Therapeutic Exercise:    18     mins  33395;     Neuromuscular Mamie:    0    mins  30454;    Therapeutic Activity:     10     mins  84617;     Gait Trainin      mins  72725;    Aquatic Therapy:     0     mins  40091;       Untimed:  Electrical Stimulation:    0     mins  86474 ( );  Mechanical Traction:    0     mins  87676;       Timed Treatment:   28   mins   Total Treatment:     28   mins      Electronically signed:   Mackenzie Whitten PTA  Physical Therapist Assistant  [employer] License #: [id number]

## 2023-12-08 ENCOUNTER — TREATMENT (OUTPATIENT)
Dept: PHYSICAL THERAPY | Facility: CLINIC | Age: 53
End: 2023-12-08
Payer: COMMERCIAL

## 2023-12-08 DIAGNOSIS — M25.561 CHRONIC PAIN OF BOTH KNEES: Primary | ICD-10-CM

## 2023-12-08 DIAGNOSIS — G89.29 CHRONIC PAIN OF BOTH KNEES: Primary | ICD-10-CM

## 2023-12-08 DIAGNOSIS — M25.562 CHRONIC PAIN OF BOTH KNEES: Primary | ICD-10-CM

## 2023-12-08 DIAGNOSIS — R29.898 WEAKNESS OF BOTH HIPS: ICD-10-CM

## 2023-12-08 PROCEDURE — 97530 THERAPEUTIC ACTIVITIES: CPT | Performed by: PHYSICAL THERAPIST

## 2023-12-08 PROCEDURE — 97110 THERAPEUTIC EXERCISES: CPT | Performed by: PHYSICAL THERAPIST

## 2023-12-08 NOTE — PROGRESS NOTES
" Physical Therapy Daily Treatment Note    Patient: Rohith Henley   : 1970  Diagnosis/ICD-10 Code:  Chronic pain of both knees [M25.561, M25.562, G89.29]  Referring practitioner: Christine Myrick PA-C  Date of Initial Visit: Type: THERAPY  Noted: 2023  Today's Date: 2023  Patient seen for 4 sessions             Subjective Evaluation    History of Present Illness    Subjective comment: Patient reports 5/10 R knee pain and 4/10 L knee pain. Patient states he hit his left knee today at work and \"I thought I was going to cry because of the pain.\"       Objective     See Exercise, Manual, and Modality Logs for complete treatment.     Assessment & Plan       Assessment  Impairments: abnormal gait, abnormal or restricted ROM, activity intolerance, impaired balance, impaired physical strength, lacks appropriate home exercise program, pain with function and safety issue   Functional limitations: sleeping, walking, uncomfortable because of pain, standing and stooping   Assessment details: Patient tolerated session well but had some popping in his right knee with squats with side steps and he states he feels tension in the back of his knees with sit to stands; this was reduced with controlled extension.     Goals  Plan Goals: KNEE PROBLEMS:   1. Mobility: Walking/Moving Around Functional Limitation     LTG 1: 12 weeks:  The patient will demonstrate decreased limitation by achieving a score of 66/80 on the LEFS.    STATUS:  Progressing   STG 1 a: 4 weeks:  The patient will demonstrate decreased limitation by achieving a score of 56/80 on the LEFS.      STATUS:  Progressing   TREATMENT:  Manual therapy, therapeutic exercise, home exercise instruction, and modalities as needed.2. The patient has limited strength of the B knee.     LTG 2: 12 weeks: The patient will demonstrate 5 /5 strength for B knee flexion and extension in order to allow patient improved joint stability    STATUS:  Progressing   STG 2a: 4 " weeks: The patient will demonstrate 4+ /5 strength for B knee flexion and extension    STATUS:  Progressing   STG2b:  4 weeks:  The patient will be independent with home exercises.     STATUS:  Progressing   TREATMENT: Manual therapy, therapeutic exercise, home exercise instruction, aquatic therapy, and modalities as needed to include:  moist heat, electrical stimulation, ultrasound, and ice.     3. The patient has gait dysfunction.   LTG 3: 12 weeks:  The patient will ambulate without assistive device, independently, for community distances with minimal limp to the right lower extremity in order to improve mobility and allow patient to perform activities such as grocery shopping with greater ease.    STATUS:  Progressing   TREATMENT: Gait training, aquatic therapy, therapeutic exercise, and home exercise instruction.        Plan  Therapy options: will be seen for skilled therapy services  Planned modality interventions: cryotherapy, dry needling, TENS, thermotherapy (hydrocollator packs), ultrasound and electrical stimulation/Russian stimulation  Planned therapy interventions: manual therapy, balance/weight-bearing training, ADL retraining, flexibility, functional ROM exercises, gait training, home exercise program, joint mobilization, neuromuscular re-education, postural training, soft tissue mobilization, strengthening, stretching and therapeutic activities  Frequency: 2x week  Duration in weeks: 12  Treatment plan discussed with: patient      Progress per Plan of Care      Timed:  Manual Therapy:    0     mins  76425;  Therapeutic Exercise:    23     mins  81176;     Neuromuscular Mamie:    0    mins  99519;    Therapeutic Activity:     19     mins  29269;     Gait Trainin     mins  07953;    Aquatic Therapy:     0     mins  59779;       Untimed:  Electrical Stimulation:    0     mins  37033 ( );  Mechanical Traction:    0     mins  84000;       Timed Treatment:   42   mins   Total Treatment:      42   mins      Electronically signed:   Mackenzie Whitten PTA  Physical Therapist Assistant  [employer] License #: [id number]

## 2023-12-12 ENCOUNTER — OFFICE VISIT (OUTPATIENT)
Dept: SLEEP MEDICINE | Facility: HOSPITAL | Age: 53
End: 2023-12-12
Payer: COMMERCIAL

## 2023-12-12 VITALS
SYSTOLIC BLOOD PRESSURE: 138 MMHG | HEIGHT: 73 IN | DIASTOLIC BLOOD PRESSURE: 69 MMHG | WEIGHT: 224 LBS | HEART RATE: 77 BPM | BODY MASS INDEX: 29.69 KG/M2 | OXYGEN SATURATION: 97 %

## 2023-12-12 DIAGNOSIS — G47.33 OSA ON CPAP: Primary | ICD-10-CM

## 2023-12-12 DIAGNOSIS — I10 ESSENTIAL HYPERTENSION: ICD-10-CM

## 2023-12-12 DIAGNOSIS — E10.9 TYPE 1 DIABETES MELLITUS WITHOUT COMPLICATION: ICD-10-CM

## 2023-12-12 DIAGNOSIS — Z72.820 SLEEP DEPRIVATION: ICD-10-CM

## 2023-12-12 PROCEDURE — G0463 HOSPITAL OUTPT CLINIC VISIT: HCPCS

## 2023-12-12 RX ORDER — INSULIN ASPART 100 [IU]/ML
INJECTION, SOLUTION INTRAVENOUS; SUBCUTANEOUS
COMMUNITY
Start: 2023-11-13

## 2023-12-12 NOTE — PROGRESS NOTES
Sleep Disorder Follow Up    Patient Name: Rohith Henley  Age/Sex: 53 y.o. male  : 1970  MRN: [id number]    Date of Encounter Visit: 23   Referring Provider: No ref. provider found  Place of Service: [facility]  Patient Care Team:  Frankie Merritt MD as PCP - Jude Brunner OD (Optometry)  Marshall Gomez MD as Consulting Physician (Endocrinology)  Braulio Franklin DO (Orthopedic Surgery)    PROBLEM LIST:  Obstructive sleep apnea  Sleep deprivation    History of Present Illness:  Rohith Henley is a 53 y.o. male who is here for follow up on obstructive sleep apnea. Patient was last seen in the office on 2023.  Since last visit, patient was started on CPAP based on his prior sleep study.  Patient uses machine every night with no complaints from the mask or the pressure.  His compliance was limited however by his sleep deprivation because of his work schedule  Patient has to wake up at 1 AM to report work at 3 AM when he works in [employer], on other days he has to be at work at 6 AM but because of that there are several nights when he does not get more than 3 hours of sleep  Patient uses a fullface mask, which does  fit well. Patient denies any problem with air leak.  He does have some dry mouth.   Patient sleeps better and has a deeper sleep with the machine and feels more energy during the day time.  Currently on 10-20 cm H20.  [place] Sleepiness Scale (ESS) is 6.  Compliance download was reviewed and documented below.  Weight is down by 7 pounds since last visit.  Other comorbidities include hyperlipidemia, insulin-dependent diabetes    Review of Systems:   A ten-system review was conducted and was negative except for the following: Nasal congestion dry nose and postnasal drainage.        Past Medical History:  Past medical, surgical, social, and family history, except as mentioned above, was unchanged from the last visit.     Past Medical  History:   Diagnosis Date    Arthritis     Cubital tunnel syndrome 09/03/2015    Diabetes mellitus, type 2     H/O knee surgery     2x - R    Hyperlipidemia     Lateral epicondylitis 09/03/2015    Type 1 diabetes mellitus        Past Surgical History:   Procedure Laterality Date    BACK SURGERY      CYST REMOVED    COLONOSCOPY  2022    NORMAL    CYST REMOVAL      KNEE SURGERY Right 01/30/2014    REPAIR     TOE SURGERY      IN GROWN TOENAIL     TRIGGER FINGER RELEASE Right     MIDDLE FINGER        Home Medications:     Current Outpatient Medications:     aspirin 81 MG EC tablet, Take 1 tablet by mouth Daily., Disp: , Rfl:     atorvastatin (LIPITOR) 80 MG tablet, TAKE 1 TABLET EVERY NIGHT, Disp: 90 tablet, Rfl: 0    betamethasone dipropionate 0.05 % cream, Apply 1 application  topically to the appropriate area as directed Daily. Apply to affected area once daily.  Discontinue when resolved., Disp: 45 g, Rfl: 2    cyclobenzaprine (FLEXERIL) 10 MG tablet, Take 1 tablet by mouth At Night As Needed for Muscle Spasms., Disp: 30 tablet, Rfl: 1    diclofenac (VOLTAREN) 75 MG EC tablet, Take 1 tablet by mouth 2 (Two) Times a Day., Disp: 180 tablet, Rfl: 3    glucose blood (RIKY CONTOUR NEXT TEST) test strip, TESTING BS 4 X DAY  DX CODE E10, Disp: 400 each, Rfl: 1    glucose blood test strip, OneTouch Ultra Blue In Vitro Strip; Patient Sig: OneTouch Ultra Blue In Vitro Strip TESTING 3 TIMES DAILY; 3; 3; 13-Aug-2013; Active, Disp: , Rfl:     ibuprofen (ADVIL,MOTRIN) 200 MG tablet, ibuprofen, Disp: , Rfl:     Insulin Aspart (novoLOG) 100 UNIT/ML injection, INJECT VIA INSULIN PUMP. MAX DAILY DOSE  UNITS, Disp: , Rfl:     insulin aspart (novoLOG) 100 UNIT/ML injection, Inject vis insulin pump. Max daily dose is 100 Units, Disp: , Rfl:     Insulin Glargine (Lantus SoloStar) 100 UNIT/ML injection pen, See Admin Instructions., Disp: , Rfl:     Insulin Pen Needle (PEN NEEDLES) 31G X 6 MM misc, , Disp: , Rfl:     Lancets  "(ONETOUCH ULTRASOFT) lancets, 1 each by Other route., Disp: , Rfl:     lisinopril (PRINIVIL,ZESTRIL) 10 MG tablet, Take 1 tablet by mouth Daily., Disp: , Rfl:     Allergies:  No Known Allergies    Past Social History:  Social History     Socioeconomic History    Marital status:    Tobacco Use    Smoking status: Never    Smokeless tobacco: Never   Vaping Use    Vaping Use: Never used   Substance and Sexual Activity    Alcohol use: No    Drug use: Never    Sexual activity: Defer       Past Family History:  Family History   Problem Relation Age of Onset    Stroke Father     Hypertension Father     Diabetes Brother     Diabetes Brother     Hypertension Brother     Sleep apnea Brother     Hypertension Brother          Objective:        Vital Signs:   Visit Vitals  /69   Pulse 77   Ht 185.4 cm (72.99\")   Wt 102 kg (224 lb)   SpO2 97%   BMI 29.56 kg/m²     Wt Readings from Last 3 Encounters:   12/12/23 102 kg (224 lb)   10/12/23 106 kg (234 lb)   10/11/23 104 kg (230 lb)          Physical Exam:   GEN:  No acute distress, alert, cooperative, well developed   EYES:   Sclerae clear. No icterus. PERRL. Normal EOM  ENT:   External ears/nose normal, no oral lesions, no thrush, mucous membranes moist, Septum midline. Mallampati IV airway. Large uvula, Redundant membranous soft palate   NECK:  Supple, midline trachea, no JVD  LUNGS: Normal chest on inspection, CTAB, no wheezes. No rhonchi. No crackles. Respirations regular, even and unlabored.   CV:  Regular rhythm and rate. Normal S1/S2. No murmurs, gallops, or rubs noted.  ABD:  Soft, nontender and nondistended. Normal bowel sounds. No guarding  EXT:  Moves all extremities well. No cyanosis. No redness. No edema.   Skin: Dry, intact, no bleeding      Diagnostic Data:  In lab diagnostic polysomnography 7/14/2016:  Moderate obstructive sleep apnea with AHI of 15.7, with a supine of 24.7  Sleep-related hypoxemia with a camilla desaturation down to 87% with no clinically " significant hypoxemia with only 2.3-minute of total sleep time spent in the hypoxemic range, that was Cher and REM sleep  No periodic leg movement disorder  Weight was 240 pounds     In lab CPAP titration study done on 9/15/2016 showed good control on CPAP pressures of 8 and beyond however there has been some higher pressure requirements depending on certain position and REM sleep and auto CPAP 8-18 was recommended    Compliance download from 11/1/2023 - 11/30/2023 showed 87% adherence with an average nightly use of 3 hours and 29 minutes.  Patient is on auto CPAP 10-20 with a median/95th percentile pressure of 11.8/16.7 cm H2O, air leak is mild to moderate with a median/95th percentile of 10.2/31.3 L/min  Sleep apnea is controlled with residual AHI of 3.3      Assessment and Plan:       ICD-10-CM ICD-9-CM   1. EM on CPAP  G47.33 327.23   2. Sleep deprivation  Z72.820 V69.4   3. Essential hypertension  I10 401.9   4. Type 1 diabetes mellitus without complication (Endocrinology)  E10.9 250.01       Recommendations:     Patient is compliant with the CPAP as evident from the compliance download however patient is having severe sleep deprivation which is affecting his adherence time  Patient has to be at work in [place] at 3 AM so he wakes up at 1 AM which resulted in significant reduction in the time in bed and total sleep time  We had an in-depth education about sleep deprivation and the negative implication on the long-term and the importance to get more time in bed, we did discuss several techniques that can be used to increase that total sleep time  As far as the CPAP the patient has no problem with the mask or the pressure and he just had to get more sleep time on it.  Patient is getting both clinical and subjective benefit from the use of the CPAP machine and expect more if we can increase the time in bed  The CPAP machine is effective in controlling the underlying sleep apnea  CPAP is strongly recommended  to be continued  Patient to continue work on the weight loss  No pressure adjustment recommended  Will follow-up in 2 months to check on the adherence time and the usage done which should be  the reflection of his total sleep time and will consider further recommendation accordingly    No orders of the defined types were placed in this encounter.    No orders of the defined types were placed in this encounter.    Return in about 2 months (around 2/12/2024).    Ulises Gary MD   [facility]   12/12/23  15:40 EST    Dictated utilizing Dragon dictation

## 2023-12-13 DIAGNOSIS — M25.561 PAIN IN BOTH KNEES, UNSPECIFIED CHRONICITY: Primary | ICD-10-CM

## 2023-12-13 DIAGNOSIS — M25.562 PAIN IN BOTH KNEES, UNSPECIFIED CHRONICITY: Primary | ICD-10-CM

## 2023-12-14 ENCOUNTER — TREATMENT (OUTPATIENT)
Dept: PHYSICAL THERAPY | Facility: CLINIC | Age: 53
End: 2023-12-14
Payer: COMMERCIAL

## 2023-12-14 ENCOUNTER — OFFICE VISIT (OUTPATIENT)
Dept: ORTHOPEDIC SURGERY | Facility: CLINIC | Age: 53
End: 2023-12-14
Payer: COMMERCIAL

## 2023-12-14 ENCOUNTER — HOSPITAL ENCOUNTER (OUTPATIENT)
Dept: GENERAL RADIOLOGY | Facility: HOSPITAL | Age: 53
Discharge: HOME | End: 2023-12-14
Admitting: ORTHOPAEDIC SURGERY
Payer: COMMERCIAL

## 2023-12-14 VITALS — WEIGHT: 224 LBS | TEMPERATURE: 98.6 F | BODY MASS INDEX: 29.69 KG/M2 | HEIGHT: 73 IN

## 2023-12-14 DIAGNOSIS — R29.898 WEAKNESS OF BOTH HIPS: ICD-10-CM

## 2023-12-14 DIAGNOSIS — M25.562 PAIN IN BOTH KNEES, UNSPECIFIED CHRONICITY: ICD-10-CM

## 2023-12-14 DIAGNOSIS — M25.561 PAIN IN BOTH KNEES, UNSPECIFIED CHRONICITY: ICD-10-CM

## 2023-12-14 DIAGNOSIS — M25.562 CHRONIC PAIN OF BOTH KNEES: Primary | ICD-10-CM

## 2023-12-14 DIAGNOSIS — M25.561 CHRONIC PAIN OF BOTH KNEES: Primary | ICD-10-CM

## 2023-12-14 DIAGNOSIS — G89.29 CHRONIC PAIN OF BOTH KNEES: Primary | ICD-10-CM

## 2023-12-14 DIAGNOSIS — M17.11 PRIMARY OSTEOARTHRITIS OF RIGHT KNEE: Primary | ICD-10-CM

## 2023-12-14 DIAGNOSIS — M17.12 PRIMARY OSTEOARTHRITIS OF LEFT KNEE: ICD-10-CM

## 2023-12-14 PROCEDURE — 97110 THERAPEUTIC EXERCISES: CPT | Performed by: PHYSICAL THERAPIST

## 2023-12-14 PROCEDURE — 73560 X-RAY EXAM OF KNEE 1 OR 2: CPT

## 2023-12-14 PROCEDURE — 99204 OFFICE O/P NEW MOD 45 MIN: CPT | Performed by: ORTHOPAEDIC SURGERY

## 2023-12-14 PROCEDURE — 97112 NEUROMUSCULAR REEDUCATION: CPT | Performed by: PHYSICAL THERAPIST

## 2023-12-14 PROCEDURE — 97530 THERAPEUTIC ACTIVITIES: CPT | Performed by: PHYSICAL THERAPIST

## 2023-12-14 NOTE — PROGRESS NOTES
Chief Complaint  Pain and Establish Care of the Left Knee and Pain and Establish Care of the Right Knee    Subjective    History of Present Illness      Rohith Henley is a 53 y.o. male who presents to [facility] for a second opinion on bilateral knee pain.  History of Present Illness this is a patient who has been seen by Dr. Dennis noriega and Dr. Braulio Franklin in [place].  He has progressive pain and discomfort in the medial aspect of both knees.  Most of the pain is on the medial aspect of the knee.  He does have some difficulty with ambulation and radiation of the pain from the medial joint line to the proximal part of the tibia.  No history of trauma is noted.  He has had knee arthroscopy on the right side x 2.  The knee arthroscopy really did not help his symptoms to any significant disease.  This patient states that his pain is 4 on a scale of 1-10.  He has a dull aching sensation on the medial aspect of the knee.  Occasionally the knee will buckle and give out from underneath him.  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  He walks on uneven surfaces as well.  He has had intra-articular injections which have really not helped him.  Unfortunately the patient is diabetic and his hemoglobin A1c in 2022 was 7.4%.  He is really not a great candidate for steroid injections to the knee.  Pain Location:  BILATERAL knee  Radiation: none  Quality: dull, aching  Intensity/Severity: moderate to severe  Duration:  About 3 years  Progression of symptoms: yes, progressive worsening  Onset quality: gradual   Timing: intermittent  Aggravating Factors: kneeling, squatting  Alleviating Factors: NSAIDs  Previous Episodes: yes  Associated Symptoms: pain, swelling, clicking/popping  ADLs Affected: ambulating, work related activities, recreational activities/sports  Previous Treatment: NSAIDs       Objective   Vital Signs:   Temp 98.6 °F (37 °C)   Ht 185.4 cm  "(73\")   Wt 102 kg (224 lb)   BMI 29.55 kg/m²     Physical Exam  Physical Exam  Vitals signs and nursing note reviewed.   Constitutional:       Appearance: Normal appearance.   Pulmonary:      Effort: Pulmonary effort is normal.   Skin:     General: Skin is warm and dry.      Capillary Refill: Capillary refill takes less than 2 seconds.   Neurological:      General: No focal deficit present.      Mental Status: He is alert and oriented to person, place, and time. Mental status is at baseline.   Psychiatric:         Mood and Affect: Mood normal.         Behavior: Behavior normal.         Thought Content: Thought content normal.         Judgment: Judgment normal.     Ortho Exam   Bilateral knee (varus). Patient has crepitus throughout range of motion. Positive patellar grind test. Mild effusion. Lachman is negative. Pivot shift is negative. Anterior and posterior drawer signs are negative. Significant joint line tenderness is noted on the medial aspect of the knee. Patient has a varus orientation of the knee. There is fullness and tenderness in the Popliteal fossa. Mild distention of a Popliteal cyst is noted in this location. Range of motion in flexion is from 0-110 degrees. Neurovascular status is intact.  Dorsalis pedis and posterior tibial artery pulses are palpable. Common peroneal nerve function is well preserved. Patient's gait is cautious and antalgic. Skin and soft tissues are mildly swollen, consistent with synovitis and effusion. The patient has a significant limp with the first few steps after starting the gait cycle. Getting out of a chair takes a lot of effort due to pain on knee flexion.           Result Review :  The following data was reviewed by: Arsenio Baugh MD on 12/14/2023:      X-rays AP and lateral of the knee are reviewed.  There is significant narrowing of the medial joint space.  Patellofemoral distance is also narrowed.  No periarticular fractures are noted.  A mild suprapatellar effusion " is noted.          Procedures           Assessment   Assessment and Plan    Diagnoses and all orders for this visit:    1. Primary osteoarthritis of right knee (Primary)    2. Primary osteoarthritis of left knee    Other orders  -     meloxicam (Mobic) 7.5 MG tablet; Take 1 tablet by mouth Daily.  Dispense: 40 tablet; Refill: 0          Follow Up   Compression/brace to prevent the knee from buckling and giving out.  Tablet meloxicam 7.5 mg tab 1 p.o. nightly for pain swelling and discomfort.  Calcium and vitamin D for bone health.  Being diabetic he is really not a good candidate for steroid injections to the knee.  Viscosupplementation injection discussed with patient.  At this point it is too premature to offer him a total knee arthroplasty and I would recommend conservative, nonoperative management for as long as possible.  Rest, ice, compression, and elevation (RICE) therapy  Stretching and strengthening exercises partial portion of the hamstrings.  OTC Tylenol 500-1000mg by mouth every 6 hours as needed for pain   Follow up in 3 month(s)  Patient was given instructions and counseling regarding his condition or for health maintenance advice. Please see specific information pulled into the AVS if appropriate.     Arsenio Baugh MD   Date of Encounter: 12/14/2023    EMR Dragon/Transcription disclaimer:  Much of this encounter note is an electronic transcription/translation of spoken language to printed text. The electronic translation of spoken language may permit erroneous, or at times, nonsensical words or phrases to be inadvertently transcribed; Although I have reviewed the note for such errors, some may still exist.

## 2023-12-14 NOTE — PROGRESS NOTES
Re-Assessment / Re-Certification        Patient: Rohith Henley   : 1970  Diagnosis/ICD-10 Code:  Chronic pain of both knees [M25.561, M25.562, G89.29]  Referring practitioner: Christine Myrick PA-C  Date of Initial Visit: Type: THERAPY  Noted: 2023  Today's Date: 2023  Patient seen for 5 sessions      Subjective:     Visit Diagnoses:    ICD-10-CM ICD-9-CM   1. Chronic pain of both knees  M25.561 719.46    M25.562 338.29    G89.29    2. Weakness of both hips  R29.898 729.89         Clinical Progress: improved  Home Program Compliance: Yes  Treatment has included: therapeutic exercise, neuromuscular re-education, and therapeutic activity      Subjective Evaluation    History of Present Illness  Mechanism of injury: Patient's pain today is 4-5/10 on the R knee.  He hit his R knee on the control handle on the lateral side of the R knee today..  He has some burning on the L knee.  Before today, the pain has been down in both knees, but he still has his moments of swelling and pain.  The R knee swells more than the L.  Stairs are hard for him in both directions.  He does feel like the knee wants to give way.    Overall, patient feels that PT has been helpful for him. He notes an improvement of 70% since starting therapy.    Patient is still having difficulty with body fatigue on side step and monster walk.    He goes to see Dr. Baugh with ortho this afternoon.              Objective          Active Range of Motion   Left Knee   Flexion: 120 degrees   Extension: 0 degrees     Right Knee   Flexion: 120 degrees   Extension: 0 degrees     Strength/Myotome Testing     Left Hip   Planes of Motion   Flexion: 4  Abduction: 4  Adduction: 4-    Right Hip   Planes of Motion   Flexion: 4-  Abduction: 4  Adduction: 4    Left Knee   Flexion: 4  Extension: 4    Right Knee   Flexion: 4  Extension: 4-     General Comments     Knee Comments  WB Status: WBAT and patient is not using an assistive device    Swelling:  medial R knee joint line    Radicular Symptoms: none noted    Tenderness: R medial and lateral joint line, L medial joint line, patella, R/L hamstrings    Gait: decreased WB on the RLE, shorter stance time on the L       Assessment & Plan       Assessment  Impairments: abnormal gait, abnormal or restricted ROM, activity intolerance, impaired balance, impaired physical strength, lacks appropriate home exercise program, pain with function and safety issue   Functional limitations: sleeping, walking, uncomfortable because of pain, standing and stooping   Assessment details: Ronald continues to have difficulty with extension on the R knee in WB and during stairs.  The knee does still buckle on him.  The R knee gives him more issues than the L.  Added in dynamic work and unlevel surface balance skills today, all of which were very challenging for him.  He is going to see ortho today for the knees.  Patient will continue to benefit from further PT services to address their remaining deficits noted and progress to achieve their goals.       Goals  Plan Goals: KNEE PROBLEMS:   1. Mobility: Walking/Moving Around Functional Limitation     LTG 1: 12 weeks:  The patient will demonstrate decreased limitation by achieving a score of 66/80 on the LEFS.    STATUS:  Progressing   STG 1 a: 4 weeks:  The patient will demonstrate decreased limitation by achieving a score of 56/80 on the LEFS.      STATUS:  MET   TREATMENT:  Manual therapy, therapeutic exercise, home exercise instruction, and modalities as needed.2. The patient has limited strength of the B knee.     LTG 2: 12 weeks: The patient will demonstrate 5 /5 strength for B knee flexion and extension in order to allow patient improved joint stability    STATUS:  Progressing   STG 2a: 4 weeks: The patient will demonstrate 4+ /5 strength for B knee flexion and extension    STATUS:  Partially met  STG2b:  4 weeks:  The patient will be independent with home exercises.     STATUS:   Progressing   TREATMENT: Manual therapy, therapeutic exercise, home exercise instruction, aquatic therapy, and modalities as needed to include:  moist heat, electrical stimulation, ultrasound, and ice.     3. The patient has gait dysfunction.   LTG 3: 12 weeks:  The patient will ambulate without assistive device, independently, for community distances with minimal limp to the right lower extremity in order to improve mobility and allow patient to perform activities such as grocery shopping with greater ease.    STATUS:  Progressing   TREATMENT: Gait training, aquatic therapy, therapeutic exercise, and home exercise instruction.        Plan  Therapy options: will be seen for skilled therapy services  Planned modality interventions: cryotherapy, dry needling, TENS, thermotherapy (hydrocollator packs), ultrasound and electrical stimulation/Russian stimulation  Planned therapy interventions: manual therapy, balance/weight-bearing training, ADL retraining, flexibility, functional ROM exercises, gait training, home exercise program, joint mobilization, neuromuscular re-education, postural training, soft tissue mobilization, strengthening, stretching and therapeutic activities  Frequency: 2x week  Duration in weeks: 8  Treatment plan discussed with: patient  Plan details: Review any change with ortho    Continue with B knee stabilization, strength, gait, balance        Progress toward previous goals: Partially Met      Recommendations: Continue as planned  Timeframe: 2 months  Prognosis to achieve goals: good    Timed:  Manual Therapy:         mins  65352;  Therapeutic Exercise:    12     mins  18904;     Neuromuscular Mamie:    10    mins  97162;    Therapeutic Activity:     13     mins  19137;     Gait Training:           mins  80427;     Ultrasound:          mins  02936;    Canalith Repos           ___  mins  48755      Untimed:  Electrical Stimulation:         mins  83553 ( );  Mechanical Traction:         mins   24959;   Dry Needling:                     mins self pay  Re-Eval:                           mins  96959         Timed Treatment:   35   mins   Total Treatment:     37   mins          PT SIGNATURE: Emy BETSY Cordova, DPJANE  [place] License: [id number]     Certification Period: 12/14/2023 thru 3/12/2024  I certify that the therapy services are furnished while this patient is under my care.  The services outlined above are required by this patient, and will be reviewed every 90 days.     PHYSICIAN: Christine Myrick PA-C  NPI: [id number]      PHYSICIAN PRINT NAME: ______________________________________________      PHYSICIAN SIGNATURE: ______________________________________________         DATE:________________________________        Please sign and return via fax to [phone].  Thank you, [facility].

## 2023-12-27 ENCOUNTER — TREATMENT (OUTPATIENT)
Dept: PHYSICAL THERAPY | Facility: CLINIC | Age: 53
End: 2023-12-27
Payer: COMMERCIAL

## 2023-12-27 DIAGNOSIS — M25.562 CHRONIC PAIN OF BOTH KNEES: Primary | ICD-10-CM

## 2023-12-27 DIAGNOSIS — M25.561 CHRONIC PAIN OF BOTH KNEES: Primary | ICD-10-CM

## 2023-12-27 DIAGNOSIS — R29.898 WEAKNESS OF BOTH HIPS: ICD-10-CM

## 2023-12-27 DIAGNOSIS — G89.29 CHRONIC PAIN OF BOTH KNEES: Primary | ICD-10-CM

## 2023-12-27 PROCEDURE — 97530 THERAPEUTIC ACTIVITIES: CPT | Performed by: PHYSICAL THERAPIST

## 2023-12-27 PROCEDURE — 97112 NEUROMUSCULAR REEDUCATION: CPT | Performed by: PHYSICAL THERAPIST

## 2023-12-27 PROCEDURE — 97110 THERAPEUTIC EXERCISES: CPT | Performed by: PHYSICAL THERAPIST

## 2023-12-27 NOTE — PROGRESS NOTES
Physical Therapy Daily Treatment Note    Patient: Rohith Henley   : 1970  Diagnosis/ICD-10 Code:  Chronic pain of both knees [M25.561, M25.562, G89.29]  Referring practitioner: Christine Myrick PA-C  Date of Initial Visit: Type: THERAPY  Noted: 2023  Today's Date: 2023  Patient seen for 6 sessions             Subjective Evaluation    History of Present Illness    Subjective comment: Patient reports he followed up with his MD who recommended wearing a brace on the right knee to help prevent needing a knee replacement and if he had a brace for the left he could wear it as needed.       Objective     See Exercise, Manual, and Modality Logs for complete treatment.     Assessment & Plan       Assessment  Impairments: abnormal gait, abnormal or restricted ROM, activity intolerance, impaired balance, impaired physical strength, lacks appropriate home exercise program, pain with function and safety issue   Functional limitations: sleeping, walking, uncomfortable because of pain, standing and stooping   Assessment details: Patient tolerated session well with no increased pain, just fatigue and difficulty with balance exercises. Pt would benefit from skilled PT to address Range of Motion  and Strength deficits, pain management and any concerns with ADLs.     Goals  Plan Goals: KNEE PROBLEMS:   1. Mobility: Walking/Moving Around Functional Limitation     LTG 1: 12 weeks:  The patient will demonstrate decreased limitation by achieving a score of 66/80 on the LEFS.    STATUS:  Progressing   STG 1 a: 4 weeks:  The patient will demonstrate decreased limitation by achieving a score of 56/80 on the LEFS.      STATUS:  MET   TREATMENT:  Manual therapy, therapeutic exercise, home exercise instruction, and modalities as needed.2. The patient has limited strength of the B knee.     LTG 2: 12 weeks: The patient will demonstrate 5 /5 strength for B knee flexion and extension in order to allow patient improved  joint stability    STATUS:  Progressing   STG 2a: 4 weeks: The patient will demonstrate 4+ /5 strength for B knee flexion and extension    STATUS:  Partially met  STG2b:  4 weeks:  The patient will be independent with home exercises.     STATUS:  Progressing   TREATMENT: Manual therapy, therapeutic exercise, home exercise instruction, aquatic therapy, and modalities as needed to include:  moist heat, electrical stimulation, ultrasound, and ice.     3. The patient has gait dysfunction.   LTG 3: 12 weeks:  The patient will ambulate without assistive device, independently, for community distances with minimal limp to the right lower extremity in order to improve mobility and allow patient to perform activities such as grocery shopping with greater ease.    STATUS:  Progressing   TREATMENT: Gait training, aquatic therapy, therapeutic exercise, and home exercise instruction.        Plan  Therapy options: will be seen for skilled therapy services  Planned modality interventions: cryotherapy, dry needling, TENS, thermotherapy (hydrocollator packs), ultrasound and electrical stimulation/Russian stimulation  Planned therapy interventions: manual therapy, balance/weight-bearing training, ADL retraining, flexibility, functional ROM exercises, gait training, home exercise program, joint mobilization, neuromuscular re-education, postural training, soft tissue mobilization, strengthening, stretching and therapeutic activities  Frequency: 2x week  Duration in weeks: 8  Treatment plan discussed with: patient  Plan details: Review any change with ortho    Continue with B knee stabilization, strength, gait, balance      Progress per Plan of Care      Timed:  Manual Therapy:    0     mins  84623;  Therapeutic Exercise:    20     mins  39676;     Neuromuscular Mamie:    8    mins  82217;    Therapeutic Activity:     12     mins  97113;     Gait Trainin     mins  39909;    Aquatic Therapy:     0     mins  45522;        Untimed:  Electrical Stimulation:    0     mins  79048 ( );  Mechanical Traction:    0     mins  73821;       Timed Treatment:   40   mins   Total Treatment:     40   mins      Electronically signed:   Mackenzie Whitten PTA  Physical Therapist Assistant  [facility] License #: [id number]

## 2023-12-28 PROBLEM — M17.12 PRIMARY OSTEOARTHRITIS OF LEFT KNEE: Status: ACTIVE | Noted: 2023-12-28

## 2023-12-28 PROBLEM — M17.11 PRIMARY OSTEOARTHRITIS OF RIGHT KNEE: Status: ACTIVE | Noted: 2023-12-28

## 2023-12-28 RX ORDER — MELOXICAM 7.5 MG/1
7.5 TABLET ORAL DAILY
Qty: 40 TABLET | Refills: 0 | Status: SHIPPED | OUTPATIENT
Start: 2023-12-28

## 2023-12-29 ENCOUNTER — TREATMENT (OUTPATIENT)
Dept: PHYSICAL THERAPY | Facility: CLINIC | Age: 53
End: 2023-12-29
Payer: COMMERCIAL

## 2023-12-29 DIAGNOSIS — M25.562 CHRONIC PAIN OF BOTH KNEES: Primary | ICD-10-CM

## 2023-12-29 DIAGNOSIS — M25.561 CHRONIC PAIN OF BOTH KNEES: Primary | ICD-10-CM

## 2023-12-29 DIAGNOSIS — R29.898 WEAKNESS OF BOTH HIPS: ICD-10-CM

## 2023-12-29 DIAGNOSIS — G89.29 CHRONIC PAIN OF BOTH KNEES: Primary | ICD-10-CM

## 2023-12-29 PROCEDURE — 97530 THERAPEUTIC ACTIVITIES: CPT | Performed by: PHYSICAL THERAPIST

## 2023-12-29 PROCEDURE — 97110 THERAPEUTIC EXERCISES: CPT | Performed by: PHYSICAL THERAPIST

## 2023-12-29 NOTE — PROGRESS NOTES
Physical Therapy Daily Treatment Note / Discharge                      [facility] [address] [place] [address]    Patient: Rohith Henley   : 1970  Diagnosis/ICD-10 Code:  Chronic pain of both knees [M25.561, M25.562, G89.29]  Referring practitioner: Christine Myrick PA-C  Date of Initial Visit: Type: THERAPY  Noted: 2023  Today's Date: 2023  Patient seen for 7 sessions           Subjective   The patient reported no new complaints today. He feels like his knees are doing well. He will continue exercising at home. He is agreeable to discharge from PT.    Objective   See Exercise, Manual, and Modality Logs for complete treatment.     Assessment/Plan  The patient demonstrated good tolerance to all functional lower extremity strengthening exercise today. He has progressed well with PT and is capable of d/c at this time.       Timed:  Manual Therapy:    0      mins  18806;  Therapeutic Exercise:    18     mins  10419;     Neuromuscular Mamie:   0    mins  04135;    Therapeutic Activity:     10     mins  71945;     Gait Trainin     mins  84118;     Aquatics                         0      mins  36020    Un-timed:  Mechanical Traction      0     mins  21593  Dry Needling     0     mins self-pay  Electrical Stimulation:    0     mins  57981 ( );      Timed Treatment:   28   mins   Total Treatment:     28   mins    Armen Little PT  Physical Therapist    Electronically signed 2023    [place] License: PT - [id number]                       Taltz Counseling: I discussed with the patient the risks of ixekizumab including but not limited to immunosuppression, serious infections, worsening of inflammatory bowel disease and drug reactions.  The patient understands that monitoring is required including a PPD at baseline and must alert us or the primary physician if symptoms of infection or other concerning signs are noted.

## 2024-01-10 ENCOUNTER — LAB (OUTPATIENT)
Dept: LAB | Facility: HOSPITAL | Age: 54
End: 2024-01-10
Payer: COMMERCIAL

## 2024-01-10 ENCOUNTER — OFFICE VISIT (OUTPATIENT)
Dept: FAMILY MEDICINE CLINIC | Age: 54
End: 2024-01-10
Payer: COMMERCIAL

## 2024-01-10 VITALS
HEART RATE: 72 BPM | DIASTOLIC BLOOD PRESSURE: 67 MMHG | WEIGHT: 236.6 LBS | BODY MASS INDEX: 31.36 KG/M2 | SYSTOLIC BLOOD PRESSURE: 120 MMHG | HEIGHT: 73 IN

## 2024-01-10 DIAGNOSIS — Z12.5 SCREENING FOR PROSTATE CANCER: ICD-10-CM

## 2024-01-10 DIAGNOSIS — I10 ESSENTIAL HYPERTENSION: ICD-10-CM

## 2024-01-10 DIAGNOSIS — I77.810 DILATED AORTIC ROOT: ICD-10-CM

## 2024-01-10 DIAGNOSIS — E78.00 HIGH CHOLESTEROL: ICD-10-CM

## 2024-01-10 DIAGNOSIS — Z11.59 ENCOUNTER FOR SCREENING FOR OTHER VIRAL DISEASES: ICD-10-CM

## 2024-01-10 DIAGNOSIS — E10.9 TYPE 1 DIABETES MELLITUS WITHOUT COMPLICATION: Primary | ICD-10-CM

## 2024-01-10 PROBLEM — Z72.820 SLEEP DEPRIVATION: Status: RESOLVED | Noted: 2023-12-12 | Resolved: 2024-01-10

## 2024-01-10 PROBLEM — Z00.00 ANNUAL PHYSICAL EXAM: Status: RESOLVED | Noted: 2022-06-22 | Resolved: 2024-01-10

## 2024-01-10 PROBLEM — R06.83 SNORING: Status: RESOLVED | Noted: 2023-09-20 | Resolved: 2024-01-10

## 2024-01-10 PROBLEM — G47.33 OSA ON CPAP: Status: RESOLVED | Noted: 2017-08-02 | Resolved: 2024-01-10

## 2024-01-10 PROBLEM — E66.9 OBESITY (BMI 30-39.9): Status: RESOLVED | Noted: 2023-09-20 | Resolved: 2024-01-10

## 2024-01-10 PROBLEM — G47.33 OBSTRUCTIVE SLEEP APNEA: Status: ACTIVE | Noted: 2023-09-20

## 2024-01-10 LAB
ALBUMIN SERPL-MCNC: 3.9 G/DL (ref 3.5–5.2)
ALBUMIN/GLOB SERPL: 1.3 G/DL
ALP SERPL-CCNC: 94 U/L (ref 39–117)
ALT SERPL W P-5'-P-CCNC: 36 U/L (ref 1–41)
ANION GAP SERPL CALCULATED.3IONS-SCNC: 8.8 MMOL/L (ref 5–15)
AST SERPL-CCNC: 26 U/L (ref 1–40)
BILIRUB SERPL-MCNC: <0.2 MG/DL (ref 0–1.2)
BILIRUB UR QL STRIP: NEGATIVE
BUN SERPL-MCNC: 22 MG/DL (ref 6–20)
BUN/CREAT SERPL: 19.6 (ref 7–25)
CALCIUM SPEC-SCNC: 9.1 MG/DL (ref 8.6–10.5)
CHLORIDE SERPL-SCNC: 102 MMOL/L (ref 98–107)
CHOLEST SERPL-MCNC: 136 MG/DL (ref 0–200)
CLARITY UR: CLEAR
CO2 SERPL-SCNC: 27.2 MMOL/L (ref 22–29)
COLOR UR: YELLOW
CREAT SERPL-MCNC: 1.12 MG/DL (ref 0.76–1.27)
DEPRECATED RDW RBC AUTO: 38 FL (ref 37–54)
EGFRCR SERPLBLD CKD-EPI 2021: 78.6 ML/MIN/1.73
ERYTHROCYTE [DISTWIDTH] IN BLOOD BY AUTOMATED COUNT: 11.9 % (ref 12.3–15.4)
GLOBULIN UR ELPH-MCNC: 3.1 GM/DL
GLUCOSE SERPL-MCNC: 224 MG/DL (ref 65–99)
GLUCOSE UR STRIP-MCNC: ABNORMAL MG/DL
HBA1C MFR BLD: 8.3 % (ref 4.8–5.6)
HCT VFR BLD AUTO: 41.4 % (ref 37.5–51)
HCV AB SER DONR QL: NORMAL
HDLC SERPL-MCNC: 45 MG/DL (ref 40–60)
HGB BLD-MCNC: 13.8 G/DL (ref 13–17.7)
HGB UR QL STRIP.AUTO: NEGATIVE
KETONES UR QL STRIP: NEGATIVE
LDLC SERPL CALC-MCNC: 75 MG/DL (ref 0–100)
LDLC/HDLC SERPL: 1.66 {RATIO}
LEUKOCYTE ESTERASE UR QL STRIP.AUTO: NEGATIVE
MCH RBC QN AUTO: 29.2 PG (ref 26.6–33)
MCHC RBC AUTO-ENTMCNC: 33.3 G/DL (ref 31.5–35.7)
MCV RBC AUTO: 87.5 FL (ref 79–97)
NITRITE UR QL STRIP: NEGATIVE
PH UR STRIP.AUTO: 5.5 [PH] (ref 5–8)
PLATELET # BLD AUTO: 302 10*3/MM3 (ref 140–450)
PMV BLD AUTO: 10.7 FL (ref 6–12)
POTASSIUM SERPL-SCNC: 4.5 MMOL/L (ref 3.5–5.2)
PROT SERPL-MCNC: 7 G/DL (ref 6–8.5)
PROT UR QL STRIP: NEGATIVE
PSA SERPL-MCNC: 1.15 NG/ML (ref 0–4)
RBC # BLD AUTO: 4.73 10*6/MM3 (ref 4.14–5.8)
SODIUM SERPL-SCNC: 138 MMOL/L (ref 136–145)
SP GR UR STRIP: 1.02 (ref 1–1.03)
TRIGL SERPL-MCNC: 82 MG/DL (ref 0–150)
TSH SERPL DL<=0.05 MIU/L-ACNC: 1.33 UIU/ML (ref 0.27–4.2)
UROBILINOGEN UR QL STRIP: ABNORMAL
VLDLC SERPL-MCNC: 16 MG/DL (ref 5–40)
WBC NRBC COR # BLD AUTO: 6.58 10*3/MM3 (ref 3.4–10.8)

## 2024-01-10 PROCEDURE — G0103 PSA SCREENING: HCPCS | Performed by: FAMILY MEDICINE

## 2024-01-10 PROCEDURE — 82570 ASSAY OF URINE CREATININE: CPT | Performed by: FAMILY MEDICINE

## 2024-01-10 PROCEDURE — 82043 UR ALBUMIN QUANTITATIVE: CPT | Performed by: FAMILY MEDICINE

## 2024-01-10 PROCEDURE — 81003 URINALYSIS AUTO W/O SCOPE: CPT | Performed by: FAMILY MEDICINE

## 2024-01-10 PROCEDURE — 84443 ASSAY THYROID STIM HORMONE: CPT | Performed by: FAMILY MEDICINE

## 2024-01-10 PROCEDURE — 80061 LIPID PANEL: CPT | Performed by: FAMILY MEDICINE

## 2024-01-10 PROCEDURE — 36415 COLL VENOUS BLD VENIPUNCTURE: CPT | Performed by: FAMILY MEDICINE

## 2024-01-10 PROCEDURE — 86803 HEPATITIS C AB TEST: CPT | Performed by: FAMILY MEDICINE

## 2024-01-10 PROCEDURE — 80053 COMPREHEN METABOLIC PANEL: CPT | Performed by: FAMILY MEDICINE

## 2024-01-10 PROCEDURE — 85027 COMPLETE CBC AUTOMATED: CPT | Performed by: FAMILY MEDICINE

## 2024-01-10 PROCEDURE — 83036 HEMOGLOBIN GLYCOSYLATED A1C: CPT | Performed by: FAMILY MEDICINE

## 2024-01-10 NOTE — PROGRESS NOTES
Chief Complaint  Hypertension (6 months)    Subjective          Rohith Henley presents to [facility]  History of Present Illness  --TOLERATING BLOOD PRESSURE MEDICATION WITHOUT APPARENT SIDE EFFECTS  --LAST LIPIDS WERE OK, NO ISSUES WITH THE STATIN  --NO RECENT HGA1C ON FILE, FOLLOWED BY ENDOCRINE AND HAS AN INSULIN PUMP  --WAS FOUND TO HAVE A 4 CM AORTIC ROOT ON ECHO IN 2021 BUT HAS NOT HEARD FROM THE CARDIOLOGY OFFICE ABOUT A REPEAT ECHO        No Known Allergies     Health Maintenance Due   Topic Date Due    Hepatitis B (1 of 3 - 3-dose series) Never done    Pneumococcal Vaccine 0-64 (1 of 2 - PCV) Never done    HEPATITIS C SCREENING  Never done    URINE MICROALBUMIN  04/12/2020    ZOSTER VACCINE (1 of 2) Never done    INFLUENZA VACCINE  Never done    COVID-19 Vaccine (3 - 2023-24 season) 09/01/2023        Current Outpatient Medications on File Prior to Visit   Medication Sig    aspirin 81 MG EC tablet Take 1 tablet by mouth Daily.    atorvastatin (LIPITOR) 80 MG tablet TAKE 1 TABLET EVERY NIGHT    cyclobenzaprine (FLEXERIL) 10 MG tablet Take 1 tablet by mouth At Night As Needed for Muscle Spasms.    diclofenac (VOLTAREN) 75 MG EC tablet Take 1 tablet by mouth 2 (Two) Times a Day.    glucose blood (RIKY CONTOUR NEXT TEST) test strip TESTING BS 4 X DAY  DX CODE E10    glucose blood test strip OneTouch Ultra Blue In Vitro Strip; Patient Sig: OneTouch Ultra Blue In Vitro Strip TESTING 3 TIMES DAILY; 3; 3; 13-Aug-2013; Active    insulin aspart (novoLOG) 100 UNIT/ML injection Inject vis insulin pump. Max daily dose is 100 Units    Insulin Glargine (Lantus SoloStar) 100 UNIT/ML injection pen See Admin Instructions.    Insulin Pen Needle (PEN NEEDLES) 31G X 6 MM misc     Lancets (ONETOUCH ULTRASOFT) lancets 1 each by Other route.    lisinopril (PRINIVIL,ZESTRIL) 10 MG tablet Take 1 tablet by mouth Daily.    [DISCONTINUED] betamethasone dipropionate 0.05 % cream Apply 1 application   "topically to the appropriate area as directed Daily. Apply to affected area once daily.  Discontinue when resolved.    [DISCONTINUED] ibuprofen (ADVIL,MOTRIN) 200 MG tablet ibuprofen    [DISCONTINUED] meloxicam (Mobic) 7.5 MG tablet Take 1 tablet by mouth Daily.    [DISCONTINUED] Insulin Aspart (novoLOG) 100 UNIT/ML injection INJECT VIA INSULIN PUMP. MAX DAILY DOSE  UNITS     No current facility-administered medications on file prior to visit.       Immunization History   Administered Date(s) Administered    COVID-19 (MODERNA) 1st,2nd,3rd Dose Monovalent 03/31/2021, 04/28/2021    Tdap 11/08/2018       Review of Systems   Constitutional:  Negative for activity change, appetite change, chills, fatigue and fever.   HENT:  Negative for congestion, ear pain, rhinorrhea and sore throat.    Respiratory:  Negative for cough and shortness of breath.    Cardiovascular:  Negative for chest pain, palpitations and leg swelling.   Gastrointestinal:  Negative for abdominal pain, constipation, diarrhea, nausea and vomiting.   Musculoskeletal:  Negative for arthralgias and myalgias.   Neurological:  Negative for headache.        Objective     /67 (BP Location: Left arm, Patient Position: Sitting)   Pulse 72   Ht 185.4 cm (73\")   Wt 107 kg (236 lb 9.6 oz)   BMI 31.22 kg/m²       Physical Exam  Vitals and nursing note reviewed.   Constitutional:       General: He is not in acute distress.     Appearance: Normal appearance.   Cardiovascular:      Rate and Rhythm: Normal rate and regular rhythm.      Heart sounds: Normal heart sounds. No murmur heard.  Pulmonary:      Effort: Pulmonary effort is normal.      Breath sounds: Normal breath sounds.   Abdominal:      Palpations: Abdomen is soft.      Tenderness: There is no abdominal tenderness.   Musculoskeletal:      Cervical back: Neck supple.      Right lower leg: No edema.      Left lower leg: No edema.   Lymphadenopathy:      Cervical: No cervical adenopathy. "   Neurological:      General: No focal deficit present.      Mental Status: He is alert.      Cranial Nerves: No cranial nerve deficit.      Coordination: Coordination normal.      Gait: Gait normal.   Psychiatric:         Mood and Affect: Mood normal.         Behavior: Behavior normal.         Result Review :                             Assessment and Plan      Diagnoses and all orders for this visit:    1. Type 1 diabetes mellitus without complication (Endocrinology) (Primary)  Assessment & Plan:  Diabetes is improving with treatment.   Continue current treatment regimen.  Diabetes will be reassessed in 6 months.    Orders:  -     Hemoglobin A1c  -     Microalbumin / Creatinine Urine Ratio - Urine, Clean Catch    2. Essential hypertension  Assessment & Plan:  Hypertension is improving with treatment.  Continue current treatment regimen.  Blood pressure will be reassessed at the next regular appointment.    Orders:  -     CBC (No Diff)  -     TSH Rfx On Abnormal To Free T4  -     Urinalysis With Culture If Indicated - Urine, Clean Catch    3. High cholesterol  Assessment & Plan:  Lipid abnormalities are improving with treatment.  Nutritional counseling was provided.  Lipids will be reassessed in 3 months.    Orders:  -     Comprehensive Metabolic Panel  -     Lipid Panel    4. Dilated aortic root (4 CM in 2021)   Assessment & Plan:  WE WILL CHECK WITH CARDIOLOGY ABOUT A F/U VISIT WITH THEM       5. Encounter for screening for other viral diseases  -     Hepatitis C Antibody    6. Screening for prostate cancer  -     PSA Screen            Follow Up     Return in about 6 months (around 7/10/2024).    Patient was given instructions and counseling regarding his condition or for health maintenance advice. Please see specific information pulled into the AVS if appropriate.

## 2024-01-11 ENCOUNTER — TELEPHONE (OUTPATIENT)
Dept: FAMILY MEDICINE CLINIC | Age: 54
End: 2024-01-11
Payer: COMMERCIAL

## 2024-01-11 LAB
ALBUMIN UR-MCNC: <1.2 MG/DL
CREAT UR-MCNC: 86.5 MG/DL
MICROALBUMIN/CREAT UR: NORMAL MG/G{CREAT}

## 2024-01-11 NOTE — TELEPHONE ENCOUNTER
----- Message from Frankie Merritt MD sent at 1/10/2024  5:31 PM EST -----  THIS MAN IS FOLLOWED BY [facility] OFF[facility] FOR A DILATED AORTIC ROOT SEEN ON AN ECHO.  PLEASE ASK THEM WHEN HE NEEDS TO F/U WITH THEM AND LET HIM KNOW.  OK TO PLACE A NEW REFERRAL IF NEEDED.

## 2024-01-11 NOTE — TELEPHONE ENCOUNTER
Per Dr. Gonzalez's andreea JACKSON, he does need to f/u, office will call pt to schedule. He hasn't been seen since 2021

## 2024-10-08 ENCOUNTER — HOSPITAL ENCOUNTER (OUTPATIENT)
Dept: GENERAL RADIOLOGY | Facility: HOSPITAL | Age: 54
Discharge: HOME | End: 2024-10-08
Admitting: NURSE PRACTITIONER
Payer: COMMERCIAL

## 2024-10-08 ENCOUNTER — OFFICE VISIT (OUTPATIENT)
Dept: FAMILY MEDICINE CLINIC | Age: 54
End: 2024-10-08
Payer: COMMERCIAL

## 2024-10-08 VITALS
HEART RATE: 74 BPM | BODY MASS INDEX: 30.77 KG/M2 | SYSTOLIC BLOOD PRESSURE: 125 MMHG | TEMPERATURE: 97.9 F | HEIGHT: 73 IN | WEIGHT: 232.2 LBS | DIASTOLIC BLOOD PRESSURE: 78 MMHG

## 2024-10-08 DIAGNOSIS — M17.11 PRIMARY OSTEOARTHRITIS OF RIGHT KNEE: ICD-10-CM

## 2024-10-08 DIAGNOSIS — M17.11 PRIMARY OSTEOARTHRITIS OF RIGHT KNEE: Primary | ICD-10-CM

## 2024-10-08 PROCEDURE — 73562 X-RAY EXAM OF KNEE 3: CPT

## 2024-10-08 PROCEDURE — 99213 OFFICE O/P EST LOW 20 MIN: CPT | Performed by: NURSE PRACTITIONER

## 2024-10-08 RX ORDER — MELOXICAM 15 MG/1
TABLET ORAL
COMMUNITY

## 2024-10-08 NOTE — PROGRESS NOTES
Rohith Henley presents to [facility].    Chief Complaint:  Edema (Right knee swelling x 2 weeks )         History of Present Illness:    Joint pain:   Right knee   Symptoms started: 2 weeks ago, symptoms flared (better today )   associated symptoms:swelling and pain  Treatment tried: ice/ heat and Ibuprofen last night, wears a brace that ortho has given him and is on mobic daily    Previous ortho : saw [facility]   Maurizio testing : 12-14-23 R knee x-ray  Impression  Mild tricompartment arthritis of the right knee with moderate-sized joint effusion.  Mild patellofemoral compartment arthritis left knee with trace size joint effusion.       PAST MEDICAL HISTORY changes:       T1DM      Hospitalizations: [facility]                Surgical History:         Colonoscopy : 2022    Arthroscopy: RIGHT KNEE;  Dr Turner 1-  Dr Franklin did 2017     Cyst on back removed  Right middle trigger finger release      Family History:         Positive for Hypertension.      Positive for Type 1 Diabetes.          Social History:       Occupation: , [email or web address]     Marital Status:    Children: 5 sons          Review of Systems:  Review of Systems   Constitutional:  Negative for fatigue and fever.   Respiratory:  Negative for cough and shortness of breath.    Cardiovascular:  Negative for chest pain and palpitations.          Current Outpatient Medications:     aspirin 81 MG EC tablet, Take 1 tablet by mouth Daily., Disp: , Rfl:     atorvastatin (LIPITOR) 80 MG tablet, TAKE 1 TABLET EVERY NIGHT, Disp: 90 tablet, Rfl: 0    cyclobenzaprine (FLEXERIL) 10 MG tablet, Take 1 tablet by mouth At Night As Needed for Muscle Spasms., Disp: 30 tablet, Rfl: 1    glucose blood (RIKY CONTOUR NEXT TEST) test strip, TESTING BS 4 X DAY  DX CODE E10, Disp: 400 each, Rfl: 1    glucose blood test strip, OneTouch Ultra Blue In Vitro Strip; Patient Sig: OneTouch Ultra Blue In Vitro Strip TESTING 3 TIMES DAILY; 3; 3;  "13-Aug-2013; Active, Disp: , Rfl:     insulin aspart (novoLOG) 100 UNIT/ML injection, Inject vis insulin pump. Max daily dose is 100 Units, Disp: , Rfl:     Insulin Glargine (Lantus SoloStar) 100 UNIT/ML injection pen, See Admin Instructions., Disp: , Rfl:     Insulin Pen Needle (PEN NEEDLES) 31G X 6 MM misc, , Disp: , Rfl:     Lancets (ONETOUCH ULTRASOFT) lancets, 1 each by Other route., Disp: , Rfl:     lisinopril (PRINIVIL,ZESTRIL) 10 MG tablet, Take 1 tablet by mouth Daily., Disp: , Rfl:     meloxicam (MOBIC) 15 MG tablet, Take  by mouth., Disp: , Rfl:     Turmeric Curcumin 500 MG capsule, Take  by mouth., Disp: , Rfl:     Vital Signs:   Vitals:    10/08/24 1620   BP: 125/78   BP Location: Right arm   Patient Position: Sitting   Cuff Size: Large Adult   Pulse: 74   Temp: 97.9 °F (36.6 °C)   TempSrc: Oral   Weight: 105 kg (232 lb 3.2 oz)   Height: 185.4 cm (73\")              Physical Exam:  Physical Exam  Vitals reviewed.   Constitutional:       General: He is not in acute distress.     Appearance: Normal appearance.   Cardiovascular:      Rate and Rhythm: Normal rate and regular rhythm.      Heart sounds: Normal heart sounds. No murmur heard.  Pulmonary:      Effort: Pulmonary effort is normal. No respiratory distress.      Breath sounds: Normal breath sounds.   Musculoskeletal:         General: Swelling (to medial right knee) and tenderness (to palpation of anterior and medial right knee and with ROM) present.   Skin:     Findings: No erythema (or warmth to right knee).   Neurological:      Mental Status: He is alert.   Psychiatric:         Mood and Affect: Mood normal.         Behavior: Behavior normal.         Result Review      The following data was reviewed by: GORDO Proctor on 10/08/2024:    Results for orders placed or performed in visit on 01/10/24   CBC (No Diff)    Specimen: Blood   Result Value Ref Range    WBC 6.58 3.40 - 10.80 10*3/mm3    RBC 4.73 4.14 - 5.80 10*6/mm3    Hemoglobin " 13.8 13.0 - 17.7 g/dL    Hematocrit 41.4 37.5 - 51.0 %    MCV 87.5 79.0 - 97.0 fL    MCH 29.2 26.6 - 33.0 pg    MCHC 33.3 31.5 - 35.7 g/dL    RDW 11.9 (L) 12.3 - 15.4 %    RDW-SD 38.0 37.0 - 54.0 fl    MPV 10.7 6.0 - 12.0 fL    Platelets 302 140 - 450 10*3/mm3   Comprehensive Metabolic Panel    Specimen: Blood   Result Value Ref Range    Glucose 224 (H) 65 - 99 mg/dL    BUN 22 (H) 6 - 20 mg/dL    Creatinine 1.12 0.76 - 1.27 mg/dL    Sodium 138 136 - 145 mmol/L    Potassium 4.5 3.5 - 5.2 mmol/L    Chloride 102 98 - 107 mmol/L    CO2 27.2 22.0 - 29.0 mmol/L    Calcium 9.1 8.6 - 10.5 mg/dL    Total Protein 7.0 6.0 - 8.5 g/dL    Albumin 3.9 3.5 - 5.2 g/dL    ALT (SGPT) 36 1 - 41 U/L    AST (SGOT) 26 1 - 40 U/L    Alkaline Phosphatase 94 39 - 117 U/L    Total Bilirubin <0.2 0.0 - 1.2 mg/dL    Globulin 3.1 gm/dL    A/G Ratio 1.3 g/dL    BUN/Creatinine Ratio 19.6 7.0 - 25.0    Anion Gap 8.8 5.0 - 15.0 mmol/L    eGFR 78.6 >60.0 mL/min/1.73   Hemoglobin A1c    Specimen: Blood   Result Value Ref Range    Hemoglobin A1C 8.30 (H) 4.80 - 5.60 %   Hepatitis C Antibody    Specimen: Blood   Result Value Ref Range    Hepatitis C Ab Non-Reactive Non-Reactive   Lipid Panel    Specimen: Blood   Result Value Ref Range    Total Cholesterol 136 0 - 200 mg/dL    Triglycerides 82 0 - 150 mg/dL    HDL Cholesterol 45 40 - 60 mg/dL    LDL Cholesterol  75 0 - 100 mg/dL    VLDL Cholesterol 16 5 - 40 mg/dL    LDL/HDL Ratio 1.66    PSA Screen    Specimen: Blood   Result Value Ref Range    PSA 1.150 0.000 - 4.000 ng/mL   Microalbumin / Creatinine Urine Ratio - Urine, Clean Catch    Specimen: Urine, Clean Catch   Result Value Ref Range    Microalbumin/Creatinine Ratio      Creatinine, Urine 86.5 mg/dL    Microalbumin, Urine <1.2 mg/dL   TSH Rfx On Abnormal To Free T4    Specimen: Blood   Result Value Ref Range    TSH 1.330 0.270 - 4.200 uIU/mL   Urinalysis With Culture If Indicated - Urine, Clean Catch    Specimen: Urine, Clean Catch   Result Value  Ref Range    Color, UA Yellow Yellow, Straw    Appearance, UA Clear Clear    pH, UA 5.5 5.0 - 8.0    Specific Gravity, UA 1.018 1.005 - 1.030    Glucose, UA >=1000 mg/dL (3+) (A) Negative    Ketones, UA Negative Negative    Bilirubin, UA Negative Negative    Blood, UA Negative Negative    Protein, UA Negative Negative    Leuk Esterase, UA Negative Negative    Nitrite, UA Negative Negative    Urobilinogen, UA 1.0 E.U./dL 0.2 - 1.0 E.U./dL               Assessment and Plan:          Diagnoses and all orders for this visit:    1. Primary osteoarthritis of right knee (Primary)  Assessment & Plan:  Reviewed last knee x-ray, continue mobic, try tylenol arthritis, rest, elevate and use ice/heat, will send for x-ray and consider sending back to ortho     Orders:  -     XR Knee 3 View Right; Future                  Follow Up   Return for follow up pending x-ray result.  Patient was given instructions and counseling regarding his condition or for health maintenance advice. Please see specific information pulled into the AVS if appropriate.

## 2024-10-08 NOTE — ASSESSMENT & PLAN NOTE
Reviewed last knee x-ray, continue mobic, try tylenol arthritis, rest, elevate and use ice/heat, will send for x-ray and consider sending back to ortho

## 2024-10-09 ENCOUNTER — TELEPHONE (OUTPATIENT)
Dept: FAMILY MEDICINE CLINIC | Age: 54
End: 2024-10-09
Payer: COMMERCIAL

## 2024-10-09 NOTE — TELEPHONE ENCOUNTER
"    Caller: Rohith Henley \"Ronald\"    Relationship: Self    Best call back number: [phone]    What test was performed: X RAY     When was the test performed: 10.9.24    [facility]       "

## 2024-10-10 NOTE — TELEPHONE ENCOUNTER
Leigha Sarmiento, APRN  10/10/2024  8:00 AM EDT    Your x-ray is similar to your last x-ray.  If your knee symptoms are not improving I can send you back to orthopedist.  Let me know and I can make that referral.

## 2024-10-14 ENCOUNTER — TELEPHONE (OUTPATIENT)
Dept: FAMILY MEDICINE CLINIC | Age: 54
End: 2024-10-14
Payer: COMMERCIAL

## 2024-10-14 DIAGNOSIS — M17.11 PRIMARY OSTEOARTHRITIS OF RIGHT KNEE: Primary | ICD-10-CM

## 2024-10-14 DIAGNOSIS — M25.561 RIGHT KNEE PAIN, UNSPECIFIED CHRONICITY: ICD-10-CM

## 2024-10-14 NOTE — TELEPHONE ENCOUNTER
"    Caller: Rohith Henley \"Ronald\"    Relationship to patient: Self    Best call back number: [phone]    Patient is needing: PATIENT CALLED REQUESTING TO SPEAK WITH CLINICAL STAFF. PATIENT STATES HE IS CONTINUING TO HAVE KNEE PAIN AND SWELLING AFTER SEEING GORDO MALLORY LAST WEEK. PATIENT STATES HE WAS TOLD TO CALL AND LET PROVIDER KNOW IF ISSUES CONTINUED SO THAT A ORTHOPEDIC APPT COULD BE SET UP FOR HIM. PATIENT STATES HE WOULD LIKE TO GO AHEAD AND GET THIS APPT SCHEDULED.         "

## 2024-10-27 NOTE — PROGRESS NOTES
Rohith Henley  1970     Office/Outpatient Visit    Visit Date: Thu, Mar 5, 2020 03:05 pm    Provider: Frankie Merritt MD (Assistant: Kathleen Giraldo MA)    Location: [facility]        Electronically signed by Frankie Merritt MD on  03/05/2020 05:25:25 PM                             Subjective:        CC: Ronald is a 49 year old White male.  presents today due to cough, fatigue, SOA pt says he hasnt taken atorvastatin in a long time         HPI:           Patient to be evaluated for cough.  These have been present for the past one week.  The symptoms include chest congestion, cough and scant sputum production.  He reports recent exposure to illness from family members.      ROS:     CONSTITUTIONAL:  Positive for fatigue.   Negative for chills or fever.      EYES:  Negative for eye drainage.      E/N/T:  Positive for nasal congestion.   Negative for ear pain or sore throat.      CARDIOVASCULAR:  Negative for chest pain.      RESPIRATORY:  Negative for dyspnea.      GASTROINTESTINAL:  Negative for abdominal pain, nausea and vomiting.      MUSCULOSKELETAL:  Negative for myalgias.      NEUROLOGICAL:  Negative for headaches.          Past Medical History / Family History / Social History:         Last Reviewed on 3/05/2020 05:21 PM by Frankie Merritt    Past Medical History:             PAST MEDICAL HISTORY         Hospitalizations: CONCUSSION         CURRENT MEDICAL PROVIDERS:    Pulmonologist    ENDOCRINOLOGIST         Surgical History:         Positive for    Arthroscopy: RIGHT KNEE;;; ;     Positive for    Treadmill stress test ( 2014--NEG;; );         Family History:         Positive for Hypertension.      Positive for Type 1 Diabetes.          Social History:     Occupation:  [employer];     Marital Status:          Tobacco/Alcohol/Supplements:     Last Reviewed on 3/05/2020 05:21 PM by Frankie Merritt    Tobacco: He has never smoked.   Non-drinker     Caffeine:  He admits to consuming caffeine via soda ( 5 servings per day ).          Substance Abuse History:     Last Reviewed on 9/24/2018 11:13 AM by Bridgette Sarmiento        Mental Health History:     Last Reviewed on 9/24/2018 11:13 AM by Bridgette Sarmiento        Communicable Diseases (eg STDs):     Last Reviewed on 9/24/2018 11:13 AM by Bridgette Sarmiento        Current Problems:     Last Reviewed on 3/05/2020 05:21 PM by Frankie Merritt    High cholesterol    Obstructive sleep apnea (adult) (pediatric)    Type 1 diabetes mellitus without complications    Pain in unspecified joint    Cough        Immunizations:     None        Allergies:     Last Reviewed on 3/05/2020 05:21 PM by Frankie Merritt    No Known Allergies.        Current Medications:     Last Reviewed on 3/05/2020 05:21 PM by Frankie Merritt    aspirin 81 mg oral tablet, delayed release (enteric coated) [1 tab daily]    Mobic 15mg Tablet [1 tab daily]    atorvastatin 80 mg oral tablet [1 tab daily]    Novolog in pump     Cymbalta 30mg Capsules, Delayed Release [1 capsule daily]        Objective:        Vitals:         Current: 3/5/2020 3:09:44 PM    Ht:  6 ft, 0.5 in;  Wt: 253.4 lbs;  BMI: 33.9T: 98.1 F (oral);  BP: 133/75 mm Hg (left arm, sitting);  P: 77 bpm (left arm (BP Cuff), sitting);  sCr: 1.05 mg/dL;  GFR: 97.50O2 Sat: 95 % (room air)        Exams:     PHYSICAL EXAM:     GENERAL: Vitals recorded well developed, well nourished;  well groomed;  no apparent distress;     EYES: conjunctiva and cornea are normal;     E/N/T:  normal EACs, TMs, nasal/oral mucosa, teeth, gingiva, and oropharynx;     RESPIRATORY: normal respiratory rate and pattern with no distress; normal breath sounds with no rales, rhonchi, wheezes or rubs;     CARDIOVASCULAR: normal rate; rhythm is regular;  no systolic murmur; no edema;     LYMPHATIC: no enlargement of cervical or facial nodes; no supraclavicular nodes;     NEUROLOGIC:  GROSSLY INTACT         Lab/Test Results:         Influenza A and B: Negative (03/05/2020),     Performed by:: TL (03/05/2020),             Assessment:         J20.9   Acute bronchitis, unspecified           ORDERS:         Meds Prescribed:       [New Rx] azithromycin 250 mg oral tablet [take 2 tablets (500 mg) by oral route once daily for 1 day then 1 tablet (250 mg) by oral route once daily for 4 days], #6 (six) tablets, Refills: 0 (zero)       [Refilled] atorvastatin 80 mg oral tablet [1 tab daily], #30 (thirty) tablets, Refills: 5 (five)       [Refilled] Cymbalta 30 mg oral capsule,delayed release (enteric coated) [1 capsule daily], #30 (thirty) capsules, Refills: 5 (five)         Lab Orders:       15432-14  Infectious agent antigen detection by immunoassay; Influenza  (In-House)            56909  Infectious agent antigen detection by immunoassay; Influenza  (In-House)                      Plan:         Acute bronchitis, unspecified        TESTS/PROCEDURES:  Will proceed with Flu A&B Flu A Flu B to be performed/scheduled now.      FOLLOW-UP: Schedule follow-up appointments on a p.r.n. basis.:.            Prescriptions:       [New Rx] azithromycin 250 mg oral tablet [take 2 tablets (500 mg) by oral route once daily for 1 day then 1 tablet (250 mg) by oral route once daily for 4 days], #6 (six) tablets, Refills: 0 (zero)           Orders:       62466-00  Infectious agent antigen detection by immunoassay; Influenza  (In-House)            32748  Infectious agent antigen detection by immunoassay; Influenza  (In-House)                  Other Prescriptions:       [Refilled] atorvastatin 80 mg oral tablet [1 tab daily], #30 (thirty) tablets, Refills: 5 (five)       [Refilled] Cymbalta 30 mg oral capsule,delayed release (enteric coated) [1 capsule daily], #30 (thirty) capsules, Refills: 5 (five)         Patient Recommendations:        For  Acute bronchitis, unspecified:    Schedule follow-up appointments as needed.                 APPOINTMENT INFORMATION:        Monday Tuesday Wednesday Thursday Friday Saturday Sunday            Time:___________________AM  PM   Date:_____________________             Charge Capture:         Primary Diagnosis:     J20.9  Acute bronchitis, unspecified           Orders:      24933  Office/outpatient visit; established patient, level 3  (In-House)            99121-51  Infectious agent antigen detection by immunoassay; Influenza  (In-House)            48191  Infectious agent antigen detection by immunoassay; Influenza  (In-House)                  ADDENDUMS:      ____________________________________    Date: 04/03/2020 12:07 PM    Author: Milean Shearer with [employer]    [phone]    Whole body scan doesnt need pre auth with [employer] insurance.    amisha Mari     [employer]     [phone]     4-14-20@10:00    kb             Fever since thursday. BL arms and legs rash, BL hands and feet red petechia since yesterday. Denies rash in mouth or difficulty breathing.

## 2024-11-07 ENCOUNTER — OFFICE VISIT (OUTPATIENT)
Dept: ORTHOPEDIC SURGERY | Facility: CLINIC | Age: 54
End: 2024-11-07
Payer: COMMERCIAL

## 2024-11-07 VITALS
SYSTOLIC BLOOD PRESSURE: 107 MMHG | HEART RATE: 93 BPM | BODY MASS INDEX: 29.55 KG/M2 | DIASTOLIC BLOOD PRESSURE: 71 MMHG | WEIGHT: 223 LBS | HEIGHT: 73 IN | OXYGEN SATURATION: 93 %

## 2024-11-07 DIAGNOSIS — M17.11 PRIMARY OSTEOARTHRITIS OF RIGHT KNEE: Primary | ICD-10-CM

## 2024-11-07 RX ADMIN — LIDOCAINE HYDROCHLORIDE 5 ML: 10 INJECTION, SOLUTION INFILTRATION; PERINEURAL at 16:33

## 2024-11-07 RX ADMIN — TRIAMCINOLONE ACETONIDE 40 MG: 40 INJECTION, SUSPENSION INTRA-ARTICULAR; INTRAMUSCULAR at 16:33

## 2024-11-07 NOTE — PROGRESS NOTES
"Chief Complaint  Pain and Initial Evaluation of the Right Knee       Subjective      Rohith Henley presents to [facility] for an evaluation  of his right knee. His right knee has been bothering him for several years. He has seen Dr. Baugh in the past. He has had two prior arthroscopies on his right knee in the past with the last surgery about five years ago. He has been wearing a normal knee brace. He locates his pain to the medial aspect of his knee. He reports his knee swells up and states the pain occasionally radiates down his leg. He has had prior injections to his knee's in the past. He recently went to his family doctor where he had a right knee x-ray.     No Known Allergies     Social History     Socioeconomic History    Marital status:    Tobacco Use    Smoking status: Never    Smokeless tobacco: Never   Vaping Use    Vaping status: Never Used   Substance and Sexual Activity    Alcohol use: No    Drug use: Never    Sexual activity: Defer        I reviewed the patient's chief complaint, history of present illness, review of systems, past medical history, surgical history, family history, social history, medications, and allergy list.     Review of Systems     Constitutional: Denies fevers, chills, weight loss  Cardiovascular: Denies chest pain, shortness of breath  Skin: Denies rashes, acute skin changes  Neurologic: Denies headache, loss of consciousness  MSK: Right knee pain       Vital Signs:   /71   Pulse 93   Ht 185.4 cm (73\")   Wt 101 kg (223 lb)   SpO2 93%   BMI 29.42 kg/m²            Ortho Exam    Physical Exam  General:Alert. No acute distress     Right lower extremity: well healed scope scars, no effusion, full extension, flexion to 130 degrees, stable to varus/valgus stress, crepitus with range of motion, stable to anterior/posterior drawer, negative  Lachman's, pain with Jen's, tender to the medial joint line  and lateral joint line, distal " neurovascularly intact, calf soft, positive EHL, FHL, GS, and TA. Sensation intact to all 5 nerves of the foot.       Large Joint Arthrocentesis: R knee  Date/Time: 11/7/2024 4:33 PM  Consent given by: patient  Site marked: site marked  Timeout: Immediately prior to procedure a time out was called to verify the correct patient, procedure, equipment, support staff and site/side marked as required   Supporting Documentation  Indications: pain   Procedure Details  Location: knee - R knee  Needle gauge: 21g.  Medications administered: 5 mL lidocaine 1 %; 40 mg triamcinolone acetonide 40 MG/ML  Patient tolerance: patient tolerated the procedure well with no immediate complications    This injection documentation was Scribed for Devon Camejo MD by Jacy Ricks MA.  11/07/24   16:33 EST      Imaging Results (Most Recent)       None             Result Review :       XR Knee 3 View Right    Result Date: 10/9/2024  Narrative: XR KNEE 3 VW RIGHT Date of Exam: 10/8/2024 4:46 PM EDT Indication: right knee pain Comparison: 12/14/2023 and prior Findings: Decreased height is noted within the medial and lateral compartments of the joint space similar to the previous exam. Mild associated marginal osteophyte formation noted along the lateral compartment similar to slightly progressed from the comparison. Mild degenerative changes of the anterior compartment with subchondral cystic changes of the undersurface of the patella again noted. Trace amount of fluid noted within the joint space. No focal soft tissue swelling     Impression: Impression: 1. Degenerative changes in the medial and anterior compartments similar given differences in technique to comparison Electronically Signed: Ryan Cerrato MD  10/9/2024 2:48 PM EDT  Workstation ID: [id number]            Assessment and Plan     Diagnoses and all orders for this visit:    1. Primary osteoarthritis of right knee (Primary)      The patient presents here today for an  evaluation  of his right knee.     Discussed the risks and benefits of a right knee steroid injection today in the office. Patient expressed understanding and wishes to proceed. He tolerated the injection well and without any complications.      Work note provided today.     He will continue the knee brace and home exercises.      Call or return if worsening symptoms.    Follow Up     6 - 8 weeks      Patient was given instructions and counseling regarding his condition or for health maintenance advice. Please see specific information pulled into the AVS if appropriate.     Scribed for Devon Camejo MD by Lise Wynn.  11/07/24   16:12 EST    I have personally performed the services described in this document as scribed by the above individual and it is both accurate and complete. Devon Camejo MD 11/09/24

## 2024-11-09 RX ORDER — LIDOCAINE HYDROCHLORIDE 10 MG/ML
5 INJECTION, SOLUTION INFILTRATION; PERINEURAL
Status: COMPLETED | OUTPATIENT
Start: 2024-11-07 | End: 2024-11-07

## 2024-11-09 RX ORDER — TRIAMCINOLONE ACETONIDE 40 MG/ML
40 INJECTION, SUSPENSION INTRA-ARTICULAR; INTRAMUSCULAR
Status: COMPLETED | OUTPATIENT
Start: 2024-11-07 | End: 2024-11-07

## 2024-12-02 ENCOUNTER — TELEPHONE (OUTPATIENT)
Dept: ORTHOPEDIC SURGERY | Facility: CLINIC | Age: 54
End: 2024-12-02
Payer: COMMERCIAL

## 2024-12-02 NOTE — TELEPHONE ENCOUNTER
LVM WITH PATIENT TO LET HIM KNOW TO CALL THE OFFICE TO GET AN EARLIER APPT THAN HIS FEB ONE IF HE PREFERS. HE STATED THAT THE INJECTION DIDN'T HELP SO HE NEEDS AN APPT TO DISCUSS OTHER OPTIONS. HE CAN SCHEDULE FIRST AVAILABLE WITH DR MOSS OR WITH BLAYNE GALEANO.

## 2024-12-02 NOTE — TELEPHONE ENCOUNTER
Caller: MYRIAM   Relationship to Patient: SELF  Phone Number:  [phone]  Reason for Call: PATIENT CALLING STATING THAT THE KNEE INJECTION DID NOT TAKE, HE IS WONDERING WHAT HE SHOULD DO NEXT

## 2024-12-13 ENCOUNTER — OFFICE VISIT (OUTPATIENT)
Dept: ORTHOPEDIC SURGERY | Facility: CLINIC | Age: 54
End: 2024-12-13
Payer: COMMERCIAL

## 2024-12-13 VITALS
HEIGHT: 73 IN | WEIGHT: 225 LBS | BODY MASS INDEX: 29.82 KG/M2 | DIASTOLIC BLOOD PRESSURE: 76 MMHG | HEART RATE: 81 BPM | OXYGEN SATURATION: 94 % | SYSTOLIC BLOOD PRESSURE: 128 MMHG

## 2024-12-13 DIAGNOSIS — M17.11 PRIMARY OSTEOARTHRITIS OF RIGHT KNEE: Primary | ICD-10-CM

## 2024-12-13 NOTE — PROGRESS NOTES
"Chief Complaint  Follow-up of the Right Knee     Subjective      Rohith Henley presents to [facility] for a follow up for his right knee. He has been treating his right knee osteoarthritis conservatively. He was last seen in the office on 11/07/24 where he had a right knee steroid injection. He reports this injection gave him minimal relief. He has been having increase in swelling to the medial  aspect of his knee. He has been wearing a brace. He has seen Dr. Baugh in the past. He has had two prior arthroscopies on his right knee in the past with the last surgery about five years ago. He reports his knee swells up and states the pain occasionally radiates down his leg.     No Known Allergies     Social History     Socioeconomic History    Marital status:    Tobacco Use    Smoking status: Never    Smokeless tobacco: Never   Vaping Use    Vaping status: Never Used   Substance and Sexual Activity    Alcohol use: No    Drug use: Never    Sexual activity: Defer        I reviewed the patient's chief complaint, history of present illness, review of systems, past medical history, surgical history, family history, social history, medications, and allergy list.     Review of Systems     Constitutional: Denies fevers, chills, weight loss  Cardiovascular: Denies chest pain, shortness of breath  Skin: Denies rashes, acute skin changes  Neurologic: Denies headache, loss of consciousness  MSK: right knee pain       Vital Signs:   /76   Pulse 81   Ht 185.4 cm (72.99\")   Wt 102 kg (225 lb)   SpO2 94%   BMI 29.69 kg/m²            Ortho Exam    Physical Exam  General:Alert. No acute distress     Right lower extremity:  well healed scope scars, no effusion, full extension, flexion to 130 degrees, stable to varus/valgus stress, crepitus with range of motion, stable to anterior/posterior drawer, negative  Lachman's, pain with Jen's, tender to the medial joint line  and lateral joint " line, distal neurovascularly intact, calf soft, positive EHL, FHL, GS, and TA. Sensation intact to all 5 nerves of the foot.          Procedures    Imaging Results (Most Recent)       None             Result Review :       No results found.           Assessment and Plan     Diagnoses and all orders for this visit:    1. Primary osteoarthritis of right knee (Primary)      The patient presents here today for a follow up for his right knee osteoarthritis.     MRI order placed today in the office to evaluate for internal derangement.     Home exercises given today and will continue current medications.      Call or return if worsening symptoms.    Follow Up     After MRI     Patient was given instructions and counseling regarding his condition or for health maintenance advice. Please see specific information pulled into the AVS if appropriate.     Scribed for Devon Camejo MD by Lise Wynn.  12/13/24   10:18 EST    I have personally performed the services described in this document as scribed by the above individual and it is both accurate and complete. Devon Camejo MD 12/13/24

## 2025-01-17 ENCOUNTER — HOSPITAL ENCOUNTER (OUTPATIENT)
Dept: MRI IMAGING | Facility: HOSPITAL | Age: 55
Discharge: HOME | End: 2025-01-17
Admitting: ORTHOPAEDIC SURGERY
Payer: COMMERCIAL

## 2025-01-17 DIAGNOSIS — M17.11 PRIMARY OSTEOARTHRITIS OF RIGHT KNEE: ICD-10-CM

## 2025-01-17 PROCEDURE — 73721 MRI JNT OF LWR EXTRE W/O DYE: CPT

## 2025-02-07 ENCOUNTER — OFFICE VISIT (OUTPATIENT)
Dept: ORTHOPEDIC SURGERY | Facility: CLINIC | Age: 55
End: 2025-02-07
Payer: COMMERCIAL

## 2025-02-07 VITALS
OXYGEN SATURATION: 96 % | HEART RATE: 88 BPM | SYSTOLIC BLOOD PRESSURE: 137 MMHG | WEIGHT: 230 LBS | BODY MASS INDEX: 30.48 KG/M2 | HEIGHT: 73 IN | DIASTOLIC BLOOD PRESSURE: 74 MMHG

## 2025-02-07 DIAGNOSIS — M25.561 RIGHT KNEE PAIN, UNSPECIFIED CHRONICITY: Primary | ICD-10-CM

## 2025-02-07 DIAGNOSIS — M17.11 PRIMARY OSTEOARTHRITIS OF RIGHT KNEE: ICD-10-CM

## 2025-02-07 DIAGNOSIS — S83.271A COMPLEX TEAR OF LATERAL MENISCUS OF RIGHT KNEE AS CURRENT INJURY, INITIAL ENCOUNTER: ICD-10-CM

## 2025-02-07 RX ORDER — MELOXICAM 15 MG/1
15 TABLET ORAL DAILY
Qty: 30 TABLET | Refills: 2 | Status: SHIPPED | OUTPATIENT
Start: 2025-02-07

## 2025-02-07 NOTE — PROGRESS NOTES
"Chief Complaint  Follow-up of the Right Knee       Subjective      Rohith Henley presents to [facility] for a follow up for his right knee. He has been treating his right knee osteoarthritis conservatively. He was last seen in the office on 12/13/24 where we ordered an MRI on his right knee. He presents to the office today for these results. He locates his pain to the medial  and lateral  knee. He has had prior injections to his knee without much relief. To review, he has been wearing a brace. He has seen Dr. Baugh in the past. He has had two prior arthroscopies on his right knee in the past with the last surgery about five years ago. He reports his knee swells up and states the pain occasionally radiates down his leg.     No Known Allergies     Social History     Socioeconomic History    Marital status:    Tobacco Use    Smoking status: Never    Smokeless tobacco: Never   Vaping Use    Vaping status: Never Used   Substance and Sexual Activity    Alcohol use: No    Drug use: Never    Sexual activity: Defer        I reviewed the patient's chief complaint, history of present illness, review of systems, past medical history, surgical history, family history, social history, medications, and allergy list.     Review of Systems     Constitutional: Denies fevers, chills, weight loss  Cardiovascular: Denies chest pain, shortness of breath  Skin: Denies rashes, acute skin changes  Neurologic: Denies headache, loss of consciousness  MSK: right knee pain       Vital Signs:   /74   Pulse 88   Ht 185.4 cm (72.99\")   Wt 104 kg (230 lb)   SpO2 96%   BMI 30.35 kg/m²            Ortho Exam    Physical Exam  General:Alert. No acute distress     Right lower extremity:  well healed scope scars, no effusion, full extension, flexion to 130 degrees, stable to varus/valgus stress, crepitus with range of motion, stable to anterior/posterior drawer, negative Lachman's, pain with Jen's, " tender to the medial joint line and lateral joint line, distal neurovascularly intact, calf soft, positive EHL, FHL, GS, and TA. Sensation intact to all 5 nerves of the foot.      Procedures    X-Ray Report:  Right knee X-Ray  Indication: Evaluation of right knee pain   AP/Lateral and Standing view(s)  Findings: moderate degenerative changes, osteophytes to the lateral  joint and patella, no acute fracture or effusion   Prior studies available for comparison: yes       Imaging Results (Most Recent)       Procedure Component Value Units Date/Time    XR Knee 3 View Right [[id number]] Resulted: 02/07/25 1022     Updated: 02/07/25 1027             Result Review :       MRI Knee Right Without Contrast    Result Date: 1/17/2025  Narrative: MRI KNEE RIGHT  WO CONTRAST Date of Exam: 1/17/2025 11:00 AM EST Indication: pain.  Comparison: Right knee x-rays 10/8/2024 Technique:  Routine multiplanar/multisequence images of the right knee were obtained without contrast administration. Findings: There is complex macerated tearing of the body and posterior horn of the lateral meniscus (series 601 image 20, series 501 image 16). There is high-grade chondral loss at the central and peripheral aspect of the weightbearing portion of the lateral femoral condyle with underlying subchondral cystic change and marrow edema. Degenerative marginal osteophytes are noted in the lateral compartment. The lateral supporting structures appear unremarkable. The medial meniscus is normal. The medial supporting structures are normal. No high-grade chondral loss in the medial compartment. The anterior cruciate ligament and posterior cruciate ligament are normal. The quadriceps tendon and patellar tendon are normal. No edema of the anterior knee fat pads. There is high-grade chondral loss and fissuring along the inferior aspect of the lateral patellar facet and median patellar ridge with underlying subchondral cystic change. The trochlear cartilage  appears relatively well-preserved. There is a trace knee joint effusion. There is trace fluid in a popliteal cyst. The posterior knee neurovasculature is unremarkable. Normal appearance of the muscles and subcutaneous tissues. No focal bony lesion. No fracture.     Impression: Impression: Complex macerated tearing of the body and posterior horn of the lateral meniscus. High-grade chondral loss at the central and peripheral aspect of the weightbearing portion of the lateral femoral condyle. High-grade chondral loss and fissuring along the inferior aspect of the lateral patellar facet and median patellar ridge. Trace knee joint effusion and trace popliteal cyst. Electronically Signed: Juan C Cantrell MD  1/17/2025 1:22 PM EST  Workstation ID: [id number]            Assessment and Plan     Diagnoses and all orders for this visit:    1. Right knee pain, unspecified chronicity (Primary)  -     XR Knee 3 View Right    2. Primary osteoarthritis of right knee    3. Complex tear of lateral meniscus of right knee as current injury, initial encounter        The patient presents here today for a follow up for his right knee. MRI results was discussed and reviewed with the patient today in the office. X-rays were obtained in the office today and these were reviewed today.     Discussed operative treatment options regarding a right knee arthroscopy with partial lateral menisectomy and chondroplasty possible lateral release versus right knee arthroplasty with teena versus non operative treatment options regarding injections, medications and therapy. Advised the patient that if he proceed's with a right knee arthroscopy it may not give him 100% relief due to his osteoarthritis.     Patient wishes to proceed with operative treatment options regarding a right knee arthroscopy with partial  lateral menisectomy, chondroplasty and possible lateral release. Risks and benefits was discussed and reviewed with the patient today in the office.  Patient expressed understanding and wishes to proceed.      Discussed surgery., Risks/benefits discussed with patient including, but not limited to: infection, bleeding, neurovascular damage, re-rupture, aesthetic deformity, need for further surgery, and death., Surgery pamphlet given., Call or return if worsening symptoms., and I am ordering the use of the Nice1 cold therapy machine for 60 days post-op as part of an opioid-sparing approach to help manage pain and edema.  I feel this is medically necessary for the best care for this patient.       Follow Up     2 weeks post-operatively      Patient was given instructions and counseling regarding his condition or for health maintenance advice. Please see specific information pulled into the AVS if appropriate.     Scribed for Devon Camejo MD by Lise Wynn.  02/07/25   10:14 EST    I have personally performed the services described in this document as scribed by the above individual and it is both accurate and complete. Devon Camejo MD 02/08/25

## 2025-02-21 ENCOUNTER — TELEPHONE (OUTPATIENT)
Dept: ORTHOPEDIC SURGERY | Facility: CLINIC | Age: 55
End: 2025-02-21
Payer: COMMERCIAL

## 2025-02-21 NOTE — TELEPHONE ENCOUNTER
L/M ON  REQUESTING A RETURN CALL REGARDING MEDICAL CLEARANCE.   PER MEDICAL CLEARANCE/MED DIRECTIVE RECEIVED FROM  PATIENT IS TO HOLD/STOP ASPIRIN 81 MG 3 DAYS PRIOR TO SURGERY.

## 2025-03-07 ENCOUNTER — TELEPHONE (OUTPATIENT)
Dept: ORTHOPEDIC SURGERY | Facility: CLINIC | Age: 55
End: 2025-03-07

## 2025-03-07 NOTE — TELEPHONE ENCOUNTER
CALL PATIENT INFORMING HIM THAT I FAXED STD ON 02/28/2025, I HAVE NOT RECEIVED ANY FMLA FROM EMPLOYER. PATIENT VERBALIZED UNDERSTANDING TO GET IN CONTACT WITH EMPLOYER.      HUB ABLE TO RELAY TE.

## 2025-03-07 NOTE — TELEPHONE ENCOUNTER
"Provider: ISA    Caller: Rohith Henley \"Ronald\"    Relationship to Patient: Self    Phone Number: [phone]    Reason for Call: PATIENT IS ASKING FOR AN UPDATE ON HIS SHORT TERM DISABILITY PAPERWORK, HE IS ASKING IF THIS WAS SENT. HE STATES HIS EMPLOYER HASN'T RECEIVED IT. HE IS ALSO ASKING IF HIS CARDIAC CLEARANCE WAS RECEIVED.      "

## 2025-03-16 NOTE — H&P
[facility]   HISTORY AND PHYSICAL    Patient Name: Rohith Henley  : 1970  MRN: [id number]  Primary Care Physician:  Frankie Merritt MD  Date of admission: (Not on file)    Subjective   Subjective     Chief Complaint: Right knee pain    History of Present Illness: The patient is a 54-year-old male with right knee pain.  MRI reveals meniscus tear and chondromalacia.  The patient has tried conservative treatment without relief and wishes to undergo operative treatment.  He reports pain and mechanical symptoms to the knee.    Review of Systems : Negative except for those mentioned in the history of present    Personal History     Past Medical History:   Diagnosis Date    Arthritis     Cubital tunnel syndrome 2015    Diabetes mellitus, type 2     H/O knee surgery     2x - R    Hyperlipidemia     Lateral epicondylitis 2015    Type 1 diabetes mellitus        Past Surgical History:   Procedure Laterality Date    BACK SURGERY      CYST REMOVED    COLONOSCOPY      NORMAL    CYST REMOVAL      KNEE SURGERY Right 2014    REPAIR     TOE SURGERY      IN GROWN TOENAIL     TRIGGER FINGER RELEASE Right     MIDDLE FINGER        Family History: family history includes Diabetes in his brother and brother; Hypertension in his brother, brother, and father; Sleep apnea in his brother; Stroke in his father. Otherwise pertinent FHx was reviewed and not pertinent to current issue.    Social History:  reports that he has never smoked. He has never used smokeless tobacco. He reports that he does not drink alcohol and does not use drugs.    Home Medications:  Insulin Glargine, Pen Needles, Turmeric Curcumin, aspirin, atorvastatin, cyclobenzaprine, glucose blood, insulin aspart, lisinopril, meloxicam, and onetouch ultrasoft    Allergies:  No Known Allergies    Objective    Objective     Vitals:        Physical Exam    Result Review    Result Review:  I have personally reviewed the results from the time  of this admission to 3/16/2025 19:37 EDT and agree with these findings:  []  Laboratory list / accordion  []  Microbiology  [x]  Radiology  []  EKG/Telemetry   []  Cardiology/Vascular   []  Pathology  []  Old records  []  Other:  Most notable findings include: MRI: Right knee lateral meniscus tear, chondromalacia      Assessment & Plan   Assessment / Plan     Brief Patient Summary:  Rohith Henley is a 54 y.o. male who has right knee chondromalacia, lateral meniscus tear    Active Hospital Problems:  Active Hospital Problems    Diagnosis     **Complex tear of lateral meniscus of right knee as current injury      Plan:   I discussed treatment options with patient.  Operative versus nonoperative treatment was discussed.  Risks and benefits of surgery were discussed.  Informed consent was obtained and the patient wishes to proceed with right knee arthroscopy with partial lateral meniscectomy, possible chondroplasty    VTE Prophylaxis:  No VTE prophylaxis order currently exists.        CODE STATUS:       Admission Status:  I believe this patient meets outpatient status.    Devon Camejo MD

## 2025-03-17 ENCOUNTER — ANESTHESIA (OUTPATIENT)
Dept: PERIOP | Facility: HOSPITAL | Age: 55
End: 2025-03-17
Payer: COMMERCIAL

## 2025-03-17 ENCOUNTER — HOSPITAL ENCOUNTER (OUTPATIENT)
Facility: HOSPITAL | Age: 55
Setting detail: HOSPITAL OUTPATIENT SURGERY
Discharge: HOME | End: 2025-03-17
Attending: ORTHOPAEDIC SURGERY | Admitting: ORTHOPAEDIC SURGERY
Payer: COMMERCIAL

## 2025-03-17 ENCOUNTER — ANESTHESIA EVENT (OUTPATIENT)
Dept: PERIOP | Facility: HOSPITAL | Age: 55
End: 2025-03-17
Payer: COMMERCIAL

## 2025-03-17 VITALS
WEIGHT: 232.59 LBS | RESPIRATION RATE: 12 BRPM | DIASTOLIC BLOOD PRESSURE: 64 MMHG | BODY MASS INDEX: 30.83 KG/M2 | SYSTOLIC BLOOD PRESSURE: 101 MMHG | HEART RATE: 56 BPM | OXYGEN SATURATION: 94 % | HEIGHT: 73 IN | TEMPERATURE: 96.9 F

## 2025-03-17 DIAGNOSIS — S83.271A COMPLEX TEAR OF LATERAL MENISCUS OF RIGHT KNEE AS CURRENT INJURY, INITIAL ENCOUNTER: ICD-10-CM

## 2025-03-17 LAB
GLUCOSE BLDC GLUCOMTR-MCNC: 175 MG/DL (ref 70–99)
GLUCOSE BLDC GLUCOMTR-MCNC: 255 MG/DL (ref 70–99)

## 2025-03-17 PROCEDURE — 82948 REAGENT STRIP/BLOOD GLUCOSE: CPT

## 2025-03-17 PROCEDURE — 25010000002 PROPOFOL 10 MG/ML EMULSION: Performed by: MARRIAGE & FAMILY THERAPIST

## 2025-03-17 PROCEDURE — 25010000002 MORPHINE SULFATE (PF) 10 MG/ML SOLUTION: Performed by: ORTHOPAEDIC SURGERY

## 2025-03-17 PROCEDURE — 82948 REAGENT STRIP/BLOOD GLUCOSE: CPT | Performed by: ANESTHESIOLOGY

## 2025-03-17 PROCEDURE — 25010000002 FENTANYL CITRATE (PF) 50 MCG/ML SOLUTION: Performed by: MARRIAGE & FAMILY THERAPIST

## 2025-03-17 PROCEDURE — 25010000002 LIDOCAINE PF 2% 2 % SOLUTION: Performed by: MARRIAGE & FAMILY THERAPIST

## 2025-03-17 PROCEDURE — 25010000002 MIDAZOLAM PER 1MG: Performed by: ANESTHESIOLOGY

## 2025-03-17 PROCEDURE — 25810000003 LACTATED RINGERS PER 1000 ML: Performed by: ANESTHESIOLOGY

## 2025-03-17 PROCEDURE — 29881 ARTHRS KNE SRG MNISECTMY M/L: CPT | Performed by: ORTHOPAEDIC SURGERY

## 2025-03-17 PROCEDURE — 25010000002 GLYCOPYRROLATE 0.2 MG/ML SOLUTION: Performed by: MARRIAGE & FAMILY THERAPIST

## 2025-03-17 PROCEDURE — 25010000002 DEXAMETHASONE PER 1 MG: Performed by: MARRIAGE & FAMILY THERAPIST

## 2025-03-17 PROCEDURE — 25010000002 CEFAZOLIN PER 500 MG: Performed by: ORTHOPAEDIC SURGERY

## 2025-03-17 PROCEDURE — 25010000002 ONDANSETRON PER 1 MG: Performed by: MARRIAGE & FAMILY THERAPIST

## 2025-03-17 PROCEDURE — 25010000002 METOCLOPRAMIDE PER 10 MG: Performed by: ANESTHESIOLOGY

## 2025-03-17 PROCEDURE — 25010000002 BUPIVACAINE (PF) 0.5 % SOLUTION: Performed by: ORTHOPAEDIC SURGERY

## 2025-03-17 RX ORDER — SODIUM CHLORIDE, SODIUM LACTATE, POTASSIUM CHLORIDE, CALCIUM CHLORIDE 600; 310; 30; 20 MG/100ML; MG/100ML; MG/100ML; MG/100ML
20 INJECTION, SOLUTION INTRAVENOUS CONTINUOUS PRN
Status: DISCONTINUED | OUTPATIENT
Start: 2025-03-17 | End: 2025-03-17 | Stop reason: HOSPADM

## 2025-03-17 RX ORDER — OXYCODONE HYDROCHLORIDE 5 MG/1
5 TABLET ORAL
Status: DISCONTINUED | OUTPATIENT
Start: 2025-03-17 | End: 2025-03-17 | Stop reason: HOSPADM

## 2025-03-17 RX ORDER — ONDANSETRON 2 MG/ML
INJECTION INTRAMUSCULAR; INTRAVENOUS AS NEEDED
Status: DISCONTINUED | OUTPATIENT
Start: 2025-03-17 | End: 2025-03-17 | Stop reason: SURG

## 2025-03-17 RX ORDER — MIDAZOLAM HYDROCHLORIDE 2 MG/2ML
2 INJECTION, SOLUTION INTRAMUSCULAR; INTRAVENOUS ONCE
Status: DISCONTINUED | OUTPATIENT
Start: 2025-03-17 | End: 2025-03-17

## 2025-03-17 RX ORDER — HYDROCODONE BITARTRATE AND ACETAMINOPHEN 7.5; 325 MG/1; MG/1
1 TABLET ORAL EVERY 4 HOURS PRN
Qty: 36 TABLET | Refills: 0 | Status: SHIPPED | OUTPATIENT
Start: 2025-03-17

## 2025-03-17 RX ORDER — PROMETHAZINE HYDROCHLORIDE 25 MG/1
25 SUPPOSITORY RECTAL ONCE AS NEEDED
Status: DISCONTINUED | OUTPATIENT
Start: 2025-03-17 | End: 2025-03-17 | Stop reason: HOSPADM

## 2025-03-17 RX ORDER — ONDANSETRON 2 MG/ML
4 INJECTION INTRAMUSCULAR; INTRAVENOUS ONCE AS NEEDED
Status: DISCONTINUED | OUTPATIENT
Start: 2025-03-17 | End: 2025-03-17 | Stop reason: HOSPADM

## 2025-03-17 RX ORDER — MIDAZOLAM HYDROCHLORIDE 2 MG/2ML
2 INJECTION, SOLUTION INTRAMUSCULAR; INTRAVENOUS ONCE
Status: COMPLETED | OUTPATIENT
Start: 2025-03-17 | End: 2025-03-17

## 2025-03-17 RX ORDER — LIDOCAINE HYDROCHLORIDE 20 MG/ML
INJECTION, SOLUTION EPIDURAL; INFILTRATION; INTRACAUDAL; PERINEURAL AS NEEDED
Status: DISCONTINUED | OUTPATIENT
Start: 2025-03-17 | End: 2025-03-17 | Stop reason: SURG

## 2025-03-17 RX ORDER — ACETAMINOPHEN 500 MG
1000 TABLET ORAL ONCE
Status: COMPLETED | OUTPATIENT
Start: 2025-03-17 | End: 2025-03-17

## 2025-03-17 RX ORDER — PROMETHAZINE HYDROCHLORIDE 12.5 MG/1
25 TABLET ORAL ONCE AS NEEDED
Status: DISCONTINUED | OUTPATIENT
Start: 2025-03-17 | End: 2025-03-17 | Stop reason: HOSPADM

## 2025-03-17 RX ORDER — BUPIVACAINE HYDROCHLORIDE 5 MG/ML
INJECTION, SOLUTION EPIDURAL; INTRACAUDAL AS NEEDED
Status: DISCONTINUED | OUTPATIENT
Start: 2025-03-17 | End: 2025-03-17 | Stop reason: HOSPADM

## 2025-03-17 RX ORDER — ASPIRIN 325 MG
325 TABLET, DELAYED RELEASE (ENTERIC COATED) ORAL DAILY
Qty: 14 TABLET | Refills: 0 | Status: SHIPPED | OUTPATIENT
Start: 2025-03-17

## 2025-03-17 RX ORDER — PROPOFOL 10 MG/ML
VIAL (ML) INTRAVENOUS AS NEEDED
Status: DISCONTINUED | OUTPATIENT
Start: 2025-03-17 | End: 2025-03-17 | Stop reason: SURG

## 2025-03-17 RX ORDER — DEXAMETHASONE SODIUM PHOSPHATE 4 MG/ML
INJECTION, SOLUTION INTRA-ARTICULAR; INTRALESIONAL; INTRAMUSCULAR; INTRAVENOUS; SOFT TISSUE AS NEEDED
Status: DISCONTINUED | OUTPATIENT
Start: 2025-03-17 | End: 2025-03-17 | Stop reason: SURG

## 2025-03-17 RX ORDER — ACETAMINOPHEN 500 MG
1000 TABLET ORAL ONCE
Status: DISCONTINUED | OUTPATIENT
Start: 2025-03-17 | End: 2025-03-17

## 2025-03-17 RX ORDER — MORPHINE SULFATE 10 MG/ML
INJECTION, SOLUTION INTRAMUSCULAR; INTRAVENOUS AS NEEDED
Status: DISCONTINUED | OUTPATIENT
Start: 2025-03-17 | End: 2025-03-17 | Stop reason: HOSPADM

## 2025-03-17 RX ORDER — FENTANYL CITRATE 50 UG/ML
INJECTION, SOLUTION INTRAMUSCULAR; INTRAVENOUS AS NEEDED
Status: DISCONTINUED | OUTPATIENT
Start: 2025-03-17 | End: 2025-03-17 | Stop reason: SURG

## 2025-03-17 RX ORDER — GLYCOPYRROLATE 0.2 MG/ML
INJECTION INTRAMUSCULAR; INTRAVENOUS AS NEEDED
Status: DISCONTINUED | OUTPATIENT
Start: 2025-03-17 | End: 2025-03-17 | Stop reason: SURG

## 2025-03-17 RX ORDER — METOCLOPRAMIDE HYDROCHLORIDE 5 MG/ML
10 INJECTION INTRAMUSCULAR; INTRAVENOUS
Status: COMPLETED | OUTPATIENT
Start: 2025-03-17 | End: 2025-03-17

## 2025-03-17 RX ORDER — METOCLOPRAMIDE HYDROCHLORIDE 5 MG/ML
10 INJECTION INTRAMUSCULAR; INTRAVENOUS
Status: DISCONTINUED | OUTPATIENT
Start: 2025-03-17 | End: 2025-03-17

## 2025-03-17 RX ADMIN — GLYCOPYRROLATE 0.1 MG: 0.2 INJECTION INTRAMUSCULAR; INTRAVENOUS at 08:36

## 2025-03-17 RX ADMIN — FENTANYL CITRATE 50 MCG: 50 INJECTION, SOLUTION INTRAMUSCULAR; INTRAVENOUS at 08:58

## 2025-03-17 RX ADMIN — SODIUM CHLORIDE, SODIUM LACTATE, POTASSIUM CHLORIDE, CALCIUM CHLORIDE: 20; 30; 600; 310 INJECTION, SOLUTION INTRAVENOUS at 09:19

## 2025-03-17 RX ADMIN — MIDAZOLAM HYDROCHLORIDE 2 MG: 1 INJECTION, SOLUTION INTRAMUSCULAR; INTRAVENOUS at 08:19

## 2025-03-17 RX ADMIN — SODIUM CHLORIDE 2 G: 9 INJECTION, SOLUTION INTRAVENOUS at 08:40

## 2025-03-17 RX ADMIN — METOCLOPRAMIDE 10 MG: 5 INJECTION, SOLUTION INTRAMUSCULAR; INTRAVENOUS at 07:46

## 2025-03-17 RX ADMIN — LIDOCAINE HYDROCHLORIDE 50 MG: 20 INJECTION, SOLUTION INTRAVENOUS at 08:36

## 2025-03-17 RX ADMIN — ACETAMINOPHEN 1000 MG: 500 TABLET ORAL at 07:46

## 2025-03-17 RX ADMIN — FENTANYL CITRATE 50 MCG: 50 INJECTION, SOLUTION INTRAMUSCULAR; INTRAVENOUS at 08:36

## 2025-03-17 RX ADMIN — SODIUM CHLORIDE, SODIUM LACTATE, POTASSIUM CHLORIDE, CALCIUM CHLORIDE 20 ML/HR: 20; 30; 600; 310 INJECTION, SOLUTION INTRAVENOUS at 07:46

## 2025-03-17 RX ADMIN — ONDANSETRON 4 MG: 2 INJECTION INTRAMUSCULAR; INTRAVENOUS at 08:45

## 2025-03-17 RX ADMIN — LIDOCAINE HYDROCHLORIDE 50 MG: 20 INJECTION, SOLUTION INTRAVENOUS at 09:19

## 2025-03-17 RX ADMIN — PROPOFOL 150 MG: 10 INJECTION, EMULSION INTRAVENOUS at 08:36

## 2025-03-17 RX ADMIN — DEXAMETHASONE SODIUM PHOSPHATE 4 MG: 4 INJECTION, SOLUTION INTRAMUSCULAR; INTRAVENOUS at 08:36

## 2025-03-17 NOTE — OP NOTE
KNEE ARTHROSCOPY  Procedure Report    Patient Name:  Rohith Henley  YOB: 1970    Date of Surgery:  3/17/2025     Indications: The patient has failed conservative treatment and wishes to proceed with operative treatment.  We discussed the risk of surgery including bleeding, infection, damage to neurovascular structures, anesthesia complications, continued pain and disability, need for additional procedures among others.  Informed consent was obtained and they wished to proceed.    Pre-op Diagnosis:   Complex tear of lateral meniscus of right knee as current injury, initial encounter [S83.271A]       Post-Op Diagnosis Codes:     * Complex tear of lateral meniscus of right knee as current injury, initial encounter [S83.271A]  Right knee chondromalacia    Procedure:  Procedure(s):  Right KNEE ARTHROSCOPY WITH PARTIAL LATERAL MENISCECTOMY AND CHONDROPLASTY    Staff:  Surgeon(s):  Devon Camejo MD         Anesthesia: General    Estimated Blood Loss: 5 mL    Implants:    Nothing was implanted during the procedure    Specimen:          None        Findings: Grade III-IV chondromalacia patellofemoral and lateral tibiofemoral joint, lateral meniscus tear    Complications: None    Description of Procedure: The operative site was marked in the preoperative holding area.  The patient was brought to the operating room and placed supine on the operating room table.  General anesthesia was given.  All bony prominences were padded.  The operative leg had a nonsterile tourniquet placed on the thigh and was placed in arthroscopic leg gan.  The opposite leg was placed in a padded leg gan and the foot of the bed was lowered.      The patient received preoperative antibiotic.  After formal timeout was held, Esmarch was used to exsanguinate the limb and tourniquet was inflated.  A stab incision was made over the anterolateral aspect of the knee and a trocar was inserted into the knee.  The knee was extended,  and diagnostic arthroscopy was performed. Anteromedial portal was made with the spinal needle from outside in.  A stab incision was made.  An arthroscopic shaver was inserted into the knee and the knee was débrided.      Patellofemoral inspection revealed grade III-IV chondromalacia over the patellofemoral joint.  There is area of high cartilage wear over the trochlea and the lateral trochlear region as well as the central lateral patellar facet.  This was grade III-IV chondromalacia.  This is Breze motorized shaver for a chondroplasty back to a stable chondral surface.  There is no evidence of significant patellar maltracking or tilt.     The knee was flexed.  The valgus stress was placed to the knee.  A probe was inserted into the medial compartment.  Medial compartment exam revealed minimal chondromalacia at the patellofemoral joint.  Intact medial meniscus without tearing.     At this point attention was turned to the notch in the femur.  The ACL was probed and found to be stable. The PCL was intact.     The lateral compartment findings included high-grade chondromalacia to the lateral tibiofemoral compartment with grade III-IV chondromalacia of the proximal tibia weightbearing cartilage surface as well as the lateral femoral condyle in a diffuse fashion.  There was a degenerative complex lateral meniscus tear involving the anterior and posterior horns.  This was debrided with the straight meniscal punch as well as the motorized shaver for a partial lateral meniscectomy.  Around 3 to 4 mm of meniscus was removed back to a stable meniscal surface.    At this point then fluid was drained from the knee.  Tourniquet was released.  The incisions were closed with 3-0 nylon in figure-of-8 fashion.  Local anesthetic, 10 mL of Marcaine and 10 mg of morphine, were injected into the knee.  Xeroform, 4x4s, soft roll and an Ace wrap were placed.  The patient awoke from anesthesia in stable condition.  There were no  complications.  All counts were correct.  The patient was stable to recovery.           Devon Camejo MD     Date: 3/17/2025  Time: 09:24 EDT

## 2025-03-17 NOTE — NURSING NOTE
Pt with implanted insulin pump in abdomen. Finger stick blood glucose 255. Anesthesia MD Vargas notified and to leave insulin pump ON and basal rate as is.

## 2025-03-17 NOTE — ANESTHESIA POSTPROCEDURE EVALUATION
Patient: Rohith Henley    Procedure Summary       Date: 03/17/25 Room / Location: [facility] OR  / [facility]    Anesthesia Start: 0830 Anesthesia Stop: 0926    Procedure: KNEE ARTHROSCOPY WITH PARTIAL LATERAL MENISCECTOMY AND CHONDROPLASTY (Right: Knee) Diagnosis:       Complex tear of lateral meniscus of right knee as current injury, initial encounter      (Complex tear of lateral meniscus of right knee as current injury, initial encounter [S83.271A])    Surgeons: Devon Camejo MD Provider: Carlos Vargas MD    Anesthesia Type: general ASA Status: 3            Anesthesia Type: general    Vitals  Vitals Value Taken Time   BP 91/55 03/17/25 09:36   Temp 36.4 °C (97.5 °F) 03/17/25 09:25   Pulse 60 03/17/25 09:37   Resp 12 03/17/25 09:25   SpO2 97 % 03/17/25 09:37   Vitals shown include unfiled device data.        Post Anesthesia Care and Evaluation    Patient location during evaluation: bedside  Patient participation: complete - patient participated  Level of consciousness: awake  Pain score: 2  Pain management: adequate    Airway patency: patent  Anesthetic complications: No anesthetic complications  PONV Status: none  Cardiovascular status: acceptable and stable  Respiratory status: acceptable  Hydration status: acceptable

## 2025-03-17 NOTE — ANESTHESIA PREPROCEDURE EVALUATION
Anesthesia Evaluation     Patient summary reviewed and Nursing notes reviewed   no history of anesthetic complications:   NPO Solid Status: > 8 hours  NPO Liquid Status: > 2 hours           Airway   Mallampati: III  TM distance: >3 FB  Neck ROM: full  No difficulty expected  Dental      Pulmonary - normal exam    breath sounds clear to auscultation  (+) ,sleep apnea  Cardiovascular - normal exam  Exercise tolerance: good (4-7 METS)    Rhythm: regular  Rate: normal    (+) hypertension, hyperlipidemia      Neuro/Psych  (+) numbness  GI/Hepatic/Renal/Endo    (+) diabetes mellitus type 1    Musculoskeletal     Abdominal    Substance History - negative use     OB/GYN negative ob/gyn ROS         Other   arthritis,     ROS/Med Hx Other: PAT Nursing Notes unavailable.          Stress test 2021 - no chest pain, maximal stress test, equivocal for ischemic changes     Echo nl fxn, nl ef 2/21/25    Anesthesia Plan    ASA 3     general     (Patient understands anesthesia not responsible for dental damage.    Will check fsbs, pt w/ insulin pump will keep pump going at basal rate until fsbs obtained)  intravenous induction     Anesthetic plan, risks, benefits, and alternatives have been provided, discussed and informed consent has been obtained with: patient.    Use of blood products discussed with patient .    Plan discussed with CRNA.      CODE STATUS:

## 2025-03-17 NOTE — DISCHARGE INSTRUCTIONS
DISCHARGE INSTRUCTIONS  POST-OPERATIVE  Knee Arthroscopic Surgery      For your surgery you had:  General anesthesia (you may have a sore throat for the first 24 hours)      Local anesthesia      You may experience dizziness, drowsiness, or light-headedness for several hours following surgery/procedure.  Do not stay alone today or tonight.  Limit your activity for 24 hours.  Resume your diet slowly.  Follow whatever special dietary instructions you may have been given by your doctor.  You should not drive or operate machinery or drink alcohol for 24 hours or while you are taking pain medication.  You should not sign legally binding documents.    Post-Operative Day #1  Post-Operative Day #1 is counted as the 1st day after surgery.  Leave ace wrap on day one to aid in the reduction of swelling.  If dressing is too tight it can be rewrapped.  Post-Operative Day #2 wednesday  Ace wrap and dressing may be removed.  Put a 4x4 dressing to suture or staple site.  Change dressing once or twice daily until suture or staples are removed.  It is normal to have some redness or irritation around skin sutures or stapes, however, if you have any expanding areas of redness with a persistent fever and increasing pain notify the physician as soon as possible.  On Post-Operative Day #2, you may want to reapply the ace wrap.  Put ace wrap directly over the knee to add compression and aid in swelling reduction.  It is normal to have some swelling down into the calf and ankle after knee arthroscopy or Anterior Cruciate Ligament (ACL) reconstruction.  If you notice a significant amount of swelling or increasing pain in calf area, notify the physician immediately.   Post-Operative Day #2 wednesday  You may shower.  During shower, avoid too much water to incision site.  Let water run down leg and then pat dry.  Do not put any ointments on the incision unless directed by surgeon.  Reapply dry dressing to sutures or staple sites.    General  Information  Full weight bearing with crutches is allowed after a standard knee arthroscopy or ACL reconstruction unless instructed otherwise by surgeon.  You may put as much weight on knee as tolerable.    Knee range of motion exercises should be started as early as the day of surgery.  Try to achieve full extension and start working on range of motion and flexion of the knee.  Move the ankle up and down periodically to help reduce swelling as well as reduce blood pooling.  To allow full extension of the knee and prevent blood clots it is very important to put pillows at the level of the mid-calf to the foot.  DO NOT put pillows directly behind knee.  SPECIAL INSTRUCTIONS:                                               DISCHARGE INSTRUCTIONS  POST-OPERATIVE   Knee Arthroscopic Surgery      General Instructions  You will receive a prescription for physical therapy, unless otherwise instructed by physician.  Physical therapy is imperative especially after ACL reconstruction and some knee arthroscopies for swelling reduction, knee range of motion and strengthening. Start later this week    [] Use ice pack on the knee for 20 minutes on and 20 minutes off.  Never place ice directly on the skin.  By following this, you will cool the knee sufficiently.  Avoid getting dressing wet.  To help reduce swelling and minimize throbbing pain, use pillows to keep the knee elevated above the level of the heart, even while sleeping.  Sit with the leg propped up on a footstool or chair with pillows.  Exercises toes for 10 minutes every hour while awake.  If you are given a prescription for pain medication, take it as prescribed.  If you are able to take over-the-counter anti-inflammatory medications such as Motrin or Aleve, you may take as per package instructions in between the pain medication to help enhance pain control and reduce swelling.  If you are taking the pain medication prescribed, do not take Tylenol too unless you consult  with the pharmacist. Generally, the pain medications prescribed have Tylenol in them and too much Tylenol can be harmful.  You should stop the anti-inflammatory medication if you experience any stomach/GI disturbances.  Consult with your physician or your pharmacist before taking over-the-counter pain medications/anti-inflammatories. It is not uncommon to have a fever post-operatively especially in the first 24-48 hours.  Deep breathing and coughing and early ambulation will help.  Anti-inflammatories can be used for fever reduction also.  If unable to urinate 6 to 8 hours after surgery or urinating frequently in small amounts, notify the physician or go to the nearest Emergency Room.  NOTIFY YOUR PHYSICIAN IF YOU EXPERIENCE:  Numbness of toes.  Inability to move toes.  Extreme coldness, paleness or blue dis-coloration of toes.  Any pain other than from the incision, such as swelling of the leg that blocks circulation of the toes.  Follow verbal instructions from your doctor.  Medications per physician instructions as indicated on Discharge Medication Information Sheet.  You should see  ______________________for follow-up care  on   .  Phone number: _________________________  Missing your appointment/follow-up could lead to serious complications.  If you have an emergency and cannot reach your doctor, go to an Emergency Room nearest your home.  Access Lab and other Diagnostic Test results and manage your personal health records by going to: [email or web address]

## 2025-03-20 ENCOUNTER — TREATMENT (OUTPATIENT)
Dept: PHYSICAL THERAPY | Facility: CLINIC | Age: 55
End: 2025-03-20
Payer: COMMERCIAL

## 2025-03-20 DIAGNOSIS — Z98.890 S/P ARTHROSCOPIC PARTIAL LATERAL MENISCECTOMY OF RIGHT KNEE: Primary | ICD-10-CM

## 2025-03-20 DIAGNOSIS — Z87.828 S/P ARTHROSCOPIC PARTIAL LATERAL MENISCECTOMY OF RIGHT KNEE: Primary | ICD-10-CM

## 2025-03-20 DIAGNOSIS — M25.661 DECREASED RANGE OF MOTION OF RIGHT KNEE: ICD-10-CM

## 2025-03-20 DIAGNOSIS — M25.561 ACUTE PAIN OF RIGHT KNEE: ICD-10-CM

## 2025-03-20 DIAGNOSIS — R29.898 WEAKNESS OF RIGHT LEG: ICD-10-CM

## 2025-03-20 NOTE — PROGRESS NOTES
Physical Therapy Initial Evaluation and Plan of Care  [facility], [address], [place] [address]      Patient: Rohith Henley   : 1970  Diagnosis/ICD-10 Code:  Acute pain of right knee [M25.561]  Referring practitioner: Devon Camejo MD  Date of Initial Visit: 3/20/2025  Today's Date: 3/20/2025  Patient seen for 1 sessions         Visit Diagnoses:    ICD-10-CM ICD-9-CM   1. Acute pain of right knee  M25.561 719.46   2. S/P arthroscopic partial lateral meniscectomy of right knee  Z98.890 V45.89    Z87.828 V15.59   3. Decreased range of motion of right knee  M25.661 719.56   4. Weakness of right leg  R29.898 729.89       Subjective Evaluation    History of Present Illness  Mechanism of injury: Ronald comes to OPPT s/p 3/17/25  R knee arthroscopy with partial lateral menisectomy and chondroplasty.  He goes back to ortho on 25 for a follow up.    This is the 3rd knee surgery on the R knee.    He has no restriction, doing WBAT and activates as tolerated.     The knee is in bad shape, cartilage is almost gone.  He will eventually have to have a TKR.  Bed helps stabilize the knee      Patient Occupation: [employer]  Current pain ratin  Quality: dull ache, burning and tight  Relieving factors: ice and medications  Aggravating factors: stairs, lifting, standing, ambulation, prolonged positioning, sleeping and squatting    Social Support  Lives with: spouse and young children    Hand dominance: left    Treatments  Previous treatment: physical therapy  Patient Goals  Patient goals for therapy: decreased edema, decreased pain, improved balance, increased motion, increased strength, independence with ADLs/IADLs, return to sport/leisure activities and return to work             Objective           General Comments     Knee Comments  R knee AROM  Flexion: 95 degrees  Extension: -5 degrees    R knee MMT  Flexion: 3+/5  Extension: 3+/5    R hip MMT  Flexion: 4-/5  Extension: 4-/5  Abduction:  3+/5  Adduction: 3+/5    WB Status: WBAT and patient is  using an assistive device (B axillary crutches), slight off balance, still occasionally getting 3 pt touch with crutches and legs, education on heel strike    Swelling: mild swelling in the R knee    Radicular Symptoms: numbness at the patellar tendon, joint line    Tenderness: medial R knee, joint line R knee         Assessment & Plan       Assessment  Impairments: abnormal gait, abnormal or restricted ROM, activity intolerance, impaired balance, impaired physical strength, lacks appropriate home exercise program and pain with function   Functional limitations: lifting, sleeping, walking, uncomfortable because of pain, sitting, standing, stooping and unable to perform repetitive tasks   Assessment details: Pt presents with limitations, noted below, that impede his ability to work, bend, squat, stair navigation, long distance walking, hobbies, getting in/out bath, putting on socks/shoes. The skills of a therapist will be required to safely and effectively implement the following treatment plan to restore maximal level of function.       Goals  Plan Goals: KNEE PROBLEMS:     1. The patient has limited ROM of the right knee.   LTG 1: 12 weeks:  The patient will demonstrate 0 to 120 degrees of ROM for the right knee in order to allow patient to perform ADLs and navigate stairs.    STATUS:  New   STG 1a: 4 weeks:  The patient will demonstrate -3 to 105 degrees of ROM for the right knee.    STATUS:  New   TREATMENT: Manual therapy, therapeutic exercise, home exercise instruction, and modalities as needed to include:  moist heat, electrical stimulation, ultrasound, and ice.    2. The patient has limited strength of the right knee.   LTG 2: 12 weeks: The patient will demonstrate 4+ /5 strength for right knee flexion and extension in order to allow patient improved joint stability    STATUS:  New   STG 2a: 4 weeks: The patient will demonstrate 4 /5 strength for right  knee flexion and extension    STATUS:  New   STG2b:  4 weeks:  The patient will be independent with home exercises.     STATUS:  New   TREATMENT: Manual therapy, therapeutic exercise, home exercise instruction, aquatic therapy, and modalities as needed to include:  moist heat, electrical stimulation, ultrasound, and ice.     3. The patient has gait dysfunction.   LTG 3: 12 weeks:  The patient will ambulate without assistive device, independently, for community distances with minimal limp to the right lower extremity in order to improve mobility and allow patient to perform activities such as grocery shopping with greater ease.    STATUS:  New   TREATMENT: Gait training, aquatic therapy, therapeutic exercise, and home exercise instruction.    4. Mobility: Walking/Moving Around Functional Limitation     LTG 4: 12 weeks:  The patient will demonstrate decreased limitation by achieving a score of 55/80 on the LEFS.    STATUS:  New   STG 4 a: 4 weeks:  The patient will demonstrate decreased limitation by achieving a score of 25/80 on the LEFS.      STATUS:  New   TREATMENT:  Manual therapy, therapeutic exercise, home exercise instruction, and modalities as needed.      Plan  Therapy options: will be seen for skilled therapy services  Planned modality interventions: cryotherapy, dry needling, electrical stimulation/Russian stimulation, TENS and thermotherapy (hydrocollator packs)  Planned therapy interventions: ADL retraining, balance/weight-bearing training, body mechanics training, flexibility, functional ROM exercises, gait training, home exercise program, IADL retraining, joint mobilization, manual therapy, neuromuscular re-education, postural training, soft tissue mobilization, strengthening, stretching and therapeutic activities  Frequency: 2x week  Duration in weeks: 12  Treatment plan discussed with: patient  Plan details: Review HEP, update as needed.    Progress with R knee and BLE strength, trunk  control/postural awareness, balance and gait training, coordination, increased stamina, decreased tightness, improved ROM/flexibility, education as needed.            History # of Personal Factors and/or Comorbidities: MODERATE (1-2)  Examination of Body System(s): # of elements: LOW (1-2)  Clinical Presentation: STABLE   Clinical Decision Making: LOW     Timed:  Manual Therapy:    8     mins  76259;  Therapeutic Exercise:    8     mins  98199;     Neuromuscular Mamie:       mins  78971;    Therapeutic Activity:          mins  89108;     Gait Training:           mins  80383;     Ultrasound:                          mins  47353;  Self Care:           mins  70173          Un-timed:  Electrical Stimulation:         mins  67903 ( );  Dry Needling          mins self-pay;  Mechanical Traction           mins  15861;  Low Eval     18     mins 27164;  Moderate Eval          mins 28153;  High Eval          mins 97494;  Re-Eval          mins 74566;  Canalith Repos         mins 61106      Timed Treatment:   16   mins   Total Treatment:     34   mins        PT SIGNATURE: Emy Cordova PT, DPT  [place] License: [id number]  Electronically signed by Emy Cordova PT, 03/20/25, 1:46 PM EDT      Initial Certification    Certification Period: 3/20/2025 thru 6/17/2025  I certify that the therapy services are furnished while this patient is under my care.  The services outlined above are required by this patient, and will be reviewed every 90 days.     PHYSICIAN: Devon Camejo MD  NPI: [id number]            PHYSICIAN PRINT NAME: ______________________________________________      PHYSICIAN SIGNATURE: ______________________________________________         DATE:________________________________        Please sign and return via fax to [phone].  Thank you, [facility].

## 2025-03-24 ENCOUNTER — TREATMENT (OUTPATIENT)
Dept: PHYSICAL THERAPY | Facility: CLINIC | Age: 55
End: 2025-03-24
Payer: COMMERCIAL

## 2025-03-24 DIAGNOSIS — M25.661 DECREASED RANGE OF MOTION OF RIGHT KNEE: ICD-10-CM

## 2025-03-24 DIAGNOSIS — Z98.890 S/P ARTHROSCOPIC PARTIAL LATERAL MENISCECTOMY OF RIGHT KNEE: ICD-10-CM

## 2025-03-24 DIAGNOSIS — R29.898 WEAKNESS OF RIGHT LEG: ICD-10-CM

## 2025-03-24 DIAGNOSIS — Z87.828 S/P ARTHROSCOPIC PARTIAL LATERAL MENISCECTOMY OF RIGHT KNEE: ICD-10-CM

## 2025-03-24 DIAGNOSIS — M25.561 ACUTE PAIN OF RIGHT KNEE: Primary | ICD-10-CM

## 2025-03-24 PROCEDURE — 97530 THERAPEUTIC ACTIVITIES: CPT | Performed by: PHYSICAL THERAPIST

## 2025-03-24 PROCEDURE — 97110 THERAPEUTIC EXERCISES: CPT | Performed by: PHYSICAL THERAPIST

## 2025-03-24 PROCEDURE — 97116 GAIT TRAINING THERAPY: CPT | Performed by: PHYSICAL THERAPIST

## 2025-03-24 NOTE — PROGRESS NOTES
Physical Therapy Daily Treatment Note  [facility], [address], [place] [address]      Patient: Rohith Henley   : 1970  Referring practitioner: Devon Camejo MD  Date of Initial Visit: Type: THERAPY  Noted: 3/20/2025  Today's Date: 3/24/2025  Patient seen for 2 sessions           Subjective Evaluation    History of Present Illness    Subjective comment: 5/10 in the R knee       Objective   See Exercise, Manual, and Modality Logs for complete treatment.       Assessment & Plan       Assessment  Assessment details: Pt required vc and demonstration of all exercises due to this being his first tx visit. Pt attempted to perform step ups on 4 inch step but was unable. Pt has pressure in the R knee and with both flexion (front) and extension (back). Pt reports having a pop in the knee over the weekend where the pain went to an 8 or 9 and the knee swelled up within an hour.           Visit Diagnoses:    ICD-10-CM ICD-9-CM   1. Acute pain of right knee  M25.561 719.46   2. S/P arthroscopic partial lateral meniscectomy of right knee  Z98.890 V45.89    Z87.828 V15.59   3. Decreased range of motion of right knee  M25.661 719.56   4. Weakness of right leg  R29.898 729.89       Progress per Plan of Care    Timed:    Therapeutic Exercise:    10     mins  32168;  Manual Therapy:         mins  46690;     Neuromuscular Mamie:       mins  45682;    Therapeutic Activity:     14     mins  56121;     Gait Trainin     mins  96760;     Ultrasound:          mins  60875;      Untimed:  Electrical Stimulation:         mins  73455 ( );  Mechanical Traction:         mins  98730;   Group           mins  69498    Timed Treatment:   32   mins   Total Treatment:     33   mins      Leigha Diallo PTA  Physical Therapist Assistant

## 2025-03-25 ENCOUNTER — TELEPHONE (OUTPATIENT)
Dept: ORTHOPEDIC SURGERY | Facility: CLINIC | Age: 55
End: 2025-03-25
Payer: COMMERCIAL

## 2025-03-25 NOTE — TELEPHONE ENCOUNTER
PATIENT STATES HE FELT A POP IN HIS KNEE OVER THE WEEKEND AND IT IMMEDIATELY HURT WORSE, AND SWELLED UP MORE. HE STATES NOW SOME OF THE SWELLING HAS GONE DOWN BUT THE KNEE IS STILL QUITE PAINFUL. HE HAS NOT BEEN TAKING THE PAIN MEDICATION REGULARLY AND HAS NOT BEEN ICING MUCH. ELEVATING THE KNEE CAUSES DISCOMFORT. PER DR. MOSS THIS POP MAY HAVE BEEN FROM THE ARTHRITIS IN THE KNEE OR FLUID. ENCOURAGED PATIENT TO ICE THE KNEE FOR SWELLING AND TAKE TYLENOL/NSAID FOR PAIN CONTROL. HE IS UNABLE TO COME IN SOONER DUE TO APPTS ON THURSDAY. ADVISED HIM TO MONITOR SYMPTOMS AND CALL THE OFFICE IF HE HAS ANY WORSENING ISSUES. HE VOICED UNDERSTANDING.

## 2025-03-27 ENCOUNTER — TREATMENT (OUTPATIENT)
Dept: PHYSICAL THERAPY | Facility: CLINIC | Age: 55
End: 2025-03-27
Payer: COMMERCIAL

## 2025-03-27 ENCOUNTER — LAB (OUTPATIENT)
Dept: LAB | Facility: HOSPITAL | Age: 55
End: 2025-03-27
Payer: COMMERCIAL

## 2025-03-27 ENCOUNTER — TRANSCRIBE ORDERS (OUTPATIENT)
Dept: ADMINISTRATIVE | Facility: HOSPITAL | Age: 55
End: 2025-03-27
Payer: COMMERCIAL

## 2025-03-27 DIAGNOSIS — E10.9 INSULIN DEPENDENT DIABETES MELLITUS TYPE IA: Primary | ICD-10-CM

## 2025-03-27 DIAGNOSIS — E78.5 HYPERLIPIDEMIA, UNSPECIFIED HYPERLIPIDEMIA TYPE: ICD-10-CM

## 2025-03-27 DIAGNOSIS — M25.661 DECREASED RANGE OF MOTION OF RIGHT KNEE: ICD-10-CM

## 2025-03-27 DIAGNOSIS — R29.898 WEAKNESS OF RIGHT LEG: ICD-10-CM

## 2025-03-27 DIAGNOSIS — M25.561 ACUTE PAIN OF RIGHT KNEE: Primary | ICD-10-CM

## 2025-03-27 DIAGNOSIS — Z98.890 S/P ARTHROSCOPIC PARTIAL LATERAL MENISCECTOMY OF RIGHT KNEE: ICD-10-CM

## 2025-03-27 DIAGNOSIS — E10.9 INSULIN DEPENDENT DIABETES MELLITUS TYPE IA: ICD-10-CM

## 2025-03-27 DIAGNOSIS — E55.9 VITAMIN D DEFICIENCY: ICD-10-CM

## 2025-03-27 DIAGNOSIS — Z87.828 S/P ARTHROSCOPIC PARTIAL LATERAL MENISCECTOMY OF RIGHT KNEE: ICD-10-CM

## 2025-03-27 LAB
25(OH)D3 SERPL-MCNC: 19.8 NG/ML (ref 30–100)
ALBUMIN SERPL-MCNC: 3.9 G/DL (ref 3.5–5.2)
ALBUMIN UR-MCNC: <1.2 MG/DL
ALBUMIN/GLOB SERPL: 1.3 G/DL
ALP SERPL-CCNC: 91 U/L (ref 39–117)
ALT SERPL W P-5'-P-CCNC: 20 U/L (ref 1–41)
ANION GAP SERPL CALCULATED.3IONS-SCNC: 8 MMOL/L (ref 5–15)
AST SERPL-CCNC: 21 U/L (ref 1–40)
BILIRUB SERPL-MCNC: 0.5 MG/DL (ref 0–1.2)
BUN SERPL-MCNC: 16 MG/DL (ref 6–20)
BUN/CREAT SERPL: 13.2 (ref 7–25)
CALCIUM SPEC-SCNC: 9 MG/DL (ref 8.6–10.5)
CHLORIDE SERPL-SCNC: 103 MMOL/L (ref 98–107)
CHOLEST SERPL-MCNC: 144 MG/DL (ref 0–200)
CO2 SERPL-SCNC: 26 MMOL/L (ref 22–29)
CREAT SERPL-MCNC: 1.21 MG/DL (ref 0.76–1.27)
CREAT UR-MCNC: 77.1 MG/DL
EGFRCR SERPLBLD CKD-EPI 2021: 71.2 ML/MIN/1.73
GLOBULIN UR ELPH-MCNC: 2.9 GM/DL
GLUCOSE SERPL-MCNC: 144 MG/DL (ref 65–99)
HDLC SERPL-MCNC: 50 MG/DL (ref 40–60)
LDLC SERPL CALC-MCNC: 76 MG/DL (ref 0–100)
LDLC/HDLC SERPL: 1.5 {RATIO}
MICROALBUMIN/CREAT UR: NORMAL MG/G{CREAT}
POTASSIUM SERPL-SCNC: 4.3 MMOL/L (ref 3.5–5.2)
PROT SERPL-MCNC: 6.8 G/DL (ref 6–8.5)
SODIUM SERPL-SCNC: 137 MMOL/L (ref 136–145)
TRIGL SERPL-MCNC: 96 MG/DL (ref 0–150)
VLDLC SERPL-MCNC: 18 MG/DL (ref 5–40)

## 2025-03-27 PROCEDURE — 80061 LIPID PANEL: CPT

## 2025-03-27 PROCEDURE — 82043 UR ALBUMIN QUANTITATIVE: CPT

## 2025-03-27 PROCEDURE — 36415 COLL VENOUS BLD VENIPUNCTURE: CPT

## 2025-03-27 PROCEDURE — 82306 VITAMIN D 25 HYDROXY: CPT

## 2025-03-27 PROCEDURE — 80053 COMPREHEN METABOLIC PANEL: CPT

## 2025-03-27 PROCEDURE — 82570 ASSAY OF URINE CREATININE: CPT

## 2025-03-27 NOTE — PROGRESS NOTES
Physical Therapy Daily Treatment Note  [facility], [address], [place] [address]      Patient: Rohith Henley   : 1970  Referring practitioner: Devon Camejo MD  Date of Initial Visit: Type: THERAPY  Noted: 3/20/2025  Today's Date: 3/27/2025  Patient seen for 3 sessions           Subjective Evaluation    History of Present Illness    Subjective comment: 6/10 in the posterior R knee       Objective   See Exercise, Manual, and Modality Logs for complete treatment.       Assessment & Plan       Assessment  Assessment details: Pt has some bruising from the pop he spoke of last visit and he reports having a feeling of the knee moving in an unstable way this morning. There is difficulty with step up more then last visit but it seemed to get better as he completed them. Pt is using 2 crutches for amb but was using a STC at last visit that may have been too short.          Visit Diagnoses:    ICD-10-CM ICD-9-CM   1. Acute pain of right knee  M25.561 719.46   2. S/P arthroscopic partial lateral meniscectomy of right knee  Z98.890 V45.89    Z87.828 V15.59   3. Decreased range of motion of right knee  M25.661 719.56   4. Weakness of right leg  R29.898 729.89       Progress per Plan of Care    Timed:    Therapeutic Exercise:    10     mins  86731;  Manual Therapy:         mins  98798;     Neuromuscular Mamie:   10    mins  31043;    Therapeutic Activity:     10     mins  66829;     Gait Training:           mins  78051;     Ultrasound:          mins  45780;      Untimed:  Electrical Stimulation:         mins  09786 ( );  Mechanical Traction:         mins  43986;   Group           mins  07608    Timed Treatment:   30   mins   Total Treatment:     32   mins      Leigha Diallo PTA  Physical Therapist Assistant

## 2025-03-31 ENCOUNTER — TREATMENT (OUTPATIENT)
Dept: PHYSICAL THERAPY | Facility: CLINIC | Age: 55
End: 2025-03-31
Payer: COMMERCIAL

## 2025-03-31 DIAGNOSIS — R29.898 WEAKNESS OF RIGHT LEG: ICD-10-CM

## 2025-03-31 DIAGNOSIS — M25.661 DECREASED RANGE OF MOTION OF RIGHT KNEE: ICD-10-CM

## 2025-03-31 DIAGNOSIS — Z98.890 S/P ARTHROSCOPIC PARTIAL LATERAL MENISCECTOMY OF RIGHT KNEE: ICD-10-CM

## 2025-03-31 DIAGNOSIS — M25.561 ACUTE PAIN OF RIGHT KNEE: Primary | ICD-10-CM

## 2025-03-31 DIAGNOSIS — Z87.828 S/P ARTHROSCOPIC PARTIAL LATERAL MENISCECTOMY OF RIGHT KNEE: ICD-10-CM

## 2025-03-31 PROCEDURE — 97112 NEUROMUSCULAR REEDUCATION: CPT | Performed by: PHYSICAL THERAPIST

## 2025-03-31 PROCEDURE — 97110 THERAPEUTIC EXERCISES: CPT | Performed by: PHYSICAL THERAPIST

## 2025-03-31 PROCEDURE — 97530 THERAPEUTIC ACTIVITIES: CPT | Performed by: PHYSICAL THERAPIST

## 2025-03-31 NOTE — PROGRESS NOTES
Physical Therapy Daily Treatment Note  [facility], [address], [place] [address]      Patient: Rohith Henley   : 1970  Referring practitioner: Devon Camejo MD  Date of Initial Visit: Type: THERAPY  Noted: 3/20/2025  Today's Date: 3/31/2025  Patient seen for 4 sessions           Visit Diagnoses:    ICD-10-CM ICD-9-CM   1. Acute pain of right knee  M25.561 719.46   2. S/P arthroscopic partial lateral meniscectomy of right knee  Z98.890 V45.89    Z87.828 V15.59   3. Decreased range of motion of right knee  M25.661 719.56   4. Weakness of right leg  R29.898 729.89       Subjective Evaluation    History of Present Illness  Mechanism of injury: Ronald is going to see ortho tomorrow for a follow up.              Objective   See Exercise, Manual, and Modality Logs for complete treatment.       Assessment & Plan       Assessment  Assessment details: Ronald's swelling is down today compared to last week after having the pop in the knee.  He is ambulating with a cane.  Good quad control and no extensor lag noted.  He does get increased tightness and pain in the posterior R Knee with quad sets and TKE.  Added in some hs stretching to help with tightness.     Review any changes at next appt if any from ortho visit on 25.           Progress per Plan of Care and Progress strengthening /stabilization /functional activity             Timed:  Manual Therapy:         mins  07807;  Therapeutic Exercise:    10     mins  58197;     Neuromuscular Mamie:   14    mins  98990;    Therapeutic Activity:     10     mins  34260;     Gait Training:           mins  48704;     Ultrasound:                          mins  21623;  Self Care:           mins  40601          Un-timed:  Electrical Stimulation:         mins  81714 ( );  Dry Needling          mins self-pay;  Mechanical Traction           mins  07830;  Low Eval          mins 87197;  Moderate Eval          mins 30399;  High Eval          mins  56388;  Re-Eval          mins 46075;  [facility]         mins 97117      Timed Treatment:   34   mins   Total Treatment:     34   mins    Emy Cordova PT, DPT  [place] License #: [id number]

## 2025-04-01 ENCOUNTER — OFFICE VISIT (OUTPATIENT)
Dept: ORTHOPEDIC SURGERY | Facility: CLINIC | Age: 55
End: 2025-04-01
Payer: COMMERCIAL

## 2025-04-01 VITALS
DIASTOLIC BLOOD PRESSURE: 78 MMHG | SYSTOLIC BLOOD PRESSURE: 132 MMHG | HEIGHT: 73 IN | HEART RATE: 68 BPM | WEIGHT: 244.2 LBS | OXYGEN SATURATION: 95 % | BODY MASS INDEX: 32.37 KG/M2

## 2025-04-01 DIAGNOSIS — Z48.89 AFTERCARE FOLLOWING SURGERY: Primary | ICD-10-CM

## 2025-04-01 PROCEDURE — 99024 POSTOP FOLLOW-UP VISIT: CPT | Performed by: ORTHOPAEDIC SURGERY

## 2025-04-01 NOTE — PROGRESS NOTES
"Chief Complaint  Follow-up of the Right Knee       Subjective      Rohith Henley presents to [facility] for a follow up for his right knee. He underwent a right knee arthroscopy with partial lateral menisectomy and chondroplasty performed on 03/17/25. He presents to the office today for his two week post-operative appointment where his sutures we removed. He is still having a lot of swelling to his right knee. He is ambulating today with a cane. He states he had a pop to his knee last weekend and had increase in pain. He has been attending outpatient physical therapy.     No Known Allergies     Social History     Socioeconomic History    Marital status:    Tobacco Use    Smoking status: Never    Smokeless tobacco: Never   Vaping Use    Vaping status: Never Used   Substance and Sexual Activity    Alcohol use: No    Drug use: Never    Sexual activity: Defer        I reviewed the patient's chief complaint, history of present illness, review of systems, past medical history, surgical history, family history, social history, medications, and allergy list.     Review of Systems     Constitutional: Denies fevers, chills, weight loss  Cardiovascular: Denies chest pain, shortness of breath  Skin: Denies rashes, acute skin changes  Neurologic: Denies headache, loss of consciousness  MSK: right knee pain       Vital Signs:   /78   Pulse 68   Ht 185.4 cm (73\")   Wt 111 kg (244 lb 3.2 oz)   SpO2 95%   BMI 32.22 kg/m²            Ortho Exam    Physical Exam  General:Alert. No acute distress     Right upper extremity: sutures were removed today in the office, no redness, no active drainage, no signs of infection, -5 degrees extension, flexion  to 90 degrees, stable to varus/valgus stress, calf soft, mild swelling, distal neurovascularly intact, positive  pulses, positive EHL, FHL, GS, and TA. Sensation intact to all 5 nerves of the foot.      Procedures    Imaging Results (Most " Recent)       None             Result Review :       No results found.           Assessment and Plan     Diagnoses and all orders for this visit:    1. Aftercare following surgery right knee arthroscopy with partial lateral menisectomy and chondroplasty performed on 03/17/25. (Primary)        The patient presents here today for a follow up for his right knee arthroscopy with partial medial  menisectomy and chondroplasty performed on 03/17/25.     Incision care was discussed and reviewed with the patient today in the office.     He will continue current medications and outpatient physical therapy.     Work note provided today.      Call or return if worsening symptoms.    Follow Up     4 weeks     Patient was given instructions and counseling regarding his condition or for health maintenance advice. Please see specific information pulled into the AVS if appropriate.     Scribed for Devon Camejo MD by Lise Wynn.  04/01/25   10:17 EDT    I have personally performed the services described in this document as scribed by the above individual and it is both accurate and complete. Devon Camejo MD 04/02/25

## 2025-04-03 ENCOUNTER — TREATMENT (OUTPATIENT)
Dept: PHYSICAL THERAPY | Facility: CLINIC | Age: 55
End: 2025-04-03
Payer: COMMERCIAL

## 2025-04-03 DIAGNOSIS — Z98.890 S/P ARTHROSCOPIC PARTIAL LATERAL MENISCECTOMY OF RIGHT KNEE: ICD-10-CM

## 2025-04-03 DIAGNOSIS — M25.661 DECREASED RANGE OF MOTION OF RIGHT KNEE: ICD-10-CM

## 2025-04-03 DIAGNOSIS — M25.561 ACUTE PAIN OF RIGHT KNEE: Primary | ICD-10-CM

## 2025-04-03 DIAGNOSIS — R29.898 WEAKNESS OF RIGHT LEG: ICD-10-CM

## 2025-04-03 DIAGNOSIS — Z87.828 S/P ARTHROSCOPIC PARTIAL LATERAL MENISCECTOMY OF RIGHT KNEE: ICD-10-CM

## 2025-04-03 NOTE — PROGRESS NOTES
Physical Therapy Daily Treatment Note      Patient: Rohith Henley   : 1970  Referring practitioner: Devon Camejo MD  Date of Initial Visit: Type: THERAPY  Noted: 3/20/2025  Today's Date: 4/3/2025  Patient seen for 5 sessions       Visit Diagnoses:    ICD-10-CM ICD-9-CM   1. Acute pain of right knee  M25.561 719.46   2. S/P arthroscopic partial lateral meniscectomy of right knee  Z98.890 V45.89    Z87.828 V15.59   3. Decreased range of motion of right knee  M25.661 719.56   4. Weakness of right leg  R29.898 729.89       Subjective   Patient reported a 2-3/10 pain to his right knee medial border this day. He said that he was taking hydrocodone when pain flares up. He also states that he looking to go back to work May 12, but is wishful to find a new position in his job that does not require a lot of lifting or being on his knees. He also wishes to get back to hiking with 40lbs on his back.   Objective   See Exercise, Manual, and Modality Logs for complete treatment.       Assessment/Plan  Patient tolerated functional strength training, balance training, and functional activities this day. He shows discomfort with any squatting bellow the knee. He showed great ability to balance on stable surfaces SLS on RLE, but is much more difficult on unstable surfaces. Will continue to add balance activities and strengthen muscles around the knee in open chain exercises.    Timed:         Manual Therapy:    0     mins  69723;     Therapeutic Exercise:    12     mins  80146;     Neuromuscular Mamie:    10    mins  38116;    Therapeutic Activity:     10     mins  36442;     Gait Trainin     mins  78166;     Ultrasound:     0     mins  59645;    Ionto                               0    mins   85423  Self Care                       0     mins   02591      Un-Timed:  Electrical Stimulation:    0     mins  41629 (MC );  Dry Needling     0     mins self-pay  Traction     0     mins 54261  Canalith Repos    0      mins 18288    Timed Treatment:   32   mins   Total Treatment:     32   mins    JOHNATHON Garcia  Supervised by: Armen Little, PT  [place] License: [id number]

## 2025-04-04 NOTE — PROGRESS NOTES
I have reviewed the notes, assessments, and/or procedures performed by William Hutson, I concur with his documentation of Rohith Henley.     Armen Little, PT

## 2025-04-10 ENCOUNTER — TREATMENT (OUTPATIENT)
Dept: PHYSICAL THERAPY | Facility: CLINIC | Age: 55
End: 2025-04-10
Payer: COMMERCIAL

## 2025-04-10 DIAGNOSIS — Z98.890 S/P ARTHROSCOPIC PARTIAL LATERAL MENISCECTOMY OF RIGHT KNEE: ICD-10-CM

## 2025-04-10 DIAGNOSIS — R29.898 WEAKNESS OF RIGHT LEG: ICD-10-CM

## 2025-04-10 DIAGNOSIS — M25.561 ACUTE PAIN OF RIGHT KNEE: Primary | ICD-10-CM

## 2025-04-10 DIAGNOSIS — Z87.828 S/P ARTHROSCOPIC PARTIAL LATERAL MENISCECTOMY OF RIGHT KNEE: ICD-10-CM

## 2025-04-10 DIAGNOSIS — M25.661 DECREASED RANGE OF MOTION OF RIGHT KNEE: ICD-10-CM

## 2025-04-10 NOTE — PROGRESS NOTES
Physical Therapy Daily Treatment Note  [facility], [address], [place] [address]      Patient: Rohith Henley   : 1970  Referring practitioner: Deovn Camejo MD  Date of Initial Visit: Type: THERAPY  Noted: 3/20/2025  Today's Date: 4/10/2025  Patient seen for 6 sessions           Visit Diagnoses:    ICD-10-CM ICD-9-CM   1. Acute pain of right knee  M25.561 719.46   2. S/P arthroscopic partial lateral meniscectomy of right knee  Z98.890 V45.89    Z87.828 V15.59   3. Decreased range of motion of right knee  M25.661 719.56   4. Weakness of right leg  R29.898 729.89       Subjective Evaluation    History of Present Illness    Subjective comment: Increased soreness in the R knee today.  He walked 3 miles.         Objective   See Exercise, Manual, and Modality Logs for complete treatment.       Assessment & Plan       Assessment  Assessment details: Progressed with further strengthening work today, increased step heigh, and added in multiple planes of movement.  Decreased knee extension with ambulation.  Swelling still present.           Progress per Plan of Care and Progress strengthening /stabilization /functional activity             Timed:  Manual Therapy:         mins  27544;  Therapeutic Exercise:    10     mins  62976;     Neuromuscular Mamie:       mins  60358;    Therapeutic Activity:     10     mins  27156;     Gait Training:      10     mins  93589;     Ultrasound:                          mins  41105;  Self Care:           mins  22169          Un-timed:  Electrical Stimulation:         mins  97712 ( );  Dry Needling          mins self-pay;  Mechanical Traction           mins  37765;  Low Eval          mins 00377;  Moderate Eval          mins 55178;  High Eval          mins 71882;  Re-Eval          mins 00278;  Canalith Repos         mins 05283      Timed Treatment:   30   mins   Total Treatment:     30   mins    Emy Cordova, PT, DPT  [place] License #: [id number]

## 2025-04-16 ENCOUNTER — TREATMENT (OUTPATIENT)
Dept: PHYSICAL THERAPY | Facility: CLINIC | Age: 55
End: 2025-04-16
Payer: COMMERCIAL

## 2025-04-16 DIAGNOSIS — R29.898 WEAKNESS OF RIGHT LEG: ICD-10-CM

## 2025-04-16 DIAGNOSIS — Z87.828 S/P ARTHROSCOPIC PARTIAL LATERAL MENISCECTOMY OF RIGHT KNEE: ICD-10-CM

## 2025-04-16 DIAGNOSIS — M25.661 DECREASED RANGE OF MOTION OF RIGHT KNEE: ICD-10-CM

## 2025-04-16 DIAGNOSIS — M25.561 ACUTE PAIN OF RIGHT KNEE: Primary | ICD-10-CM

## 2025-04-16 DIAGNOSIS — Z98.890 S/P ARTHROSCOPIC PARTIAL LATERAL MENISCECTOMY OF RIGHT KNEE: ICD-10-CM

## 2025-04-16 NOTE — PROGRESS NOTES
"Outpatient Physical Therapy                   Physical Therapy Daily Treatment Note    Patient: Rohith Henley   : 1970  Diagnosis/ICD-10 Code:  Acute pain of right knee [M25.561]  Referring practitioner: Devon Camejo MD  Date of Initial Visit: Type: THERAPY  Noted: 3/20/2025  Today's Date: 2025  Patient seen for 7 sessions             Subjective   Rohith Henley reports: he has trying to do more without his cane. Pt ambulated four miles in town yesterday; he used his cane some but carried it mostly. Pt notes that his knee is popping and if he moves it the wrong way \"it feels like its going to explode;\" this lasts a couple minutes.      Pain: 5-6/10 pain, at time of arrival.     Objective     See Exercise, Manual, and Modality Logs for complete treatment.     Assessment/Plan  Rohith still experiencing increased right knee pain, especially if he turns wrong. Pt tolerated exercises well, with some complaints of mild pain with step ups. Pt would benefit from skilled PT to address Range of Motion and Strength deficits, pain management and any concerns with ADLs.     Progress per Plan of Care      Timed:  Manual Therapy:    0     mins  16446;  Therapeutic Exercise:    19     mins  77978;     Neuromuscular Mamie:    0    mins  99406;    Therapeutic Activity:     10     mins  64789;     Self care:                       0     mins  55315;  Gait Trainin     mins  41138;       Ultrasound:                    0     mins  47394;      Untimed:  Mechanical Traction:    0     mins  73744;   Electrical Stimulation:    0     mins  27114 ( );      Timed Treatment:   29   mins   Total Treatment:     29   mins      Electronically signed:   Mackenzie Whitten PTA  Physical Therapist Assistant  [facility] License #: [id number]  "

## 2025-04-18 ENCOUNTER — TREATMENT (OUTPATIENT)
Dept: PHYSICAL THERAPY | Facility: CLINIC | Age: 55
End: 2025-04-18
Payer: COMMERCIAL

## 2025-04-18 DIAGNOSIS — Z87.828 S/P ARTHROSCOPIC PARTIAL LATERAL MENISCECTOMY OF RIGHT KNEE: ICD-10-CM

## 2025-04-18 DIAGNOSIS — R29.898 WEAKNESS OF RIGHT LEG: ICD-10-CM

## 2025-04-18 DIAGNOSIS — M25.561 ACUTE PAIN OF RIGHT KNEE: Primary | ICD-10-CM

## 2025-04-18 DIAGNOSIS — M25.661 DECREASED RANGE OF MOTION OF RIGHT KNEE: ICD-10-CM

## 2025-04-18 DIAGNOSIS — Z98.890 S/P ARTHROSCOPIC PARTIAL LATERAL MENISCECTOMY OF RIGHT KNEE: ICD-10-CM

## 2025-04-18 NOTE — PROGRESS NOTES
Re-Assessment / Re-Certification  [facility], [address], [place] [address]      Patient: Rohith Henley   : 1970  Diagnosis/ICD-10 Code:  Acute pain of right knee [M25.561]  Referring practitioner: Devon Camejo MD  Date of Initial Visit: Type: THERAPY  Noted: 3/20/2025  Today's Date: 2025  Patient seen for 8 sessions      Subjective:     Visit Diagnoses:    ICD-10-CM ICD-9-CM   1. Acute pain of right knee  M25.561 719.46   2. S/P arthroscopic partial lateral meniscectomy of right knee  Z98.890 V45.89    Z87.828 V15.59   3. Decreased range of motion of right knee  M25.661 719.56   4. Weakness of right leg  R29.898 729.89       Clinical Progress: improved  Home Program Compliance: Yes  Treatment has included: therapeutic exercise, neuromuscular re-education, manual therapy, therapeutic activity, and gait training      Subjective Evaluation    History of Present Illness  Mechanism of injury: Patient's pain today is 4-5/10 consistently, the pain increases to 6-7/10 with activity.  He continues to have increased pain medial knee and around the lateral joint line. He isn't sleeping well.  If he moves the wrong way, it will wake up him.  Overall, patient feels that PT has been helpful for him.  Returns back to the doctor 25.           Objective           General Comments     Knee Comments  R knee AROM  Flexion: 103 degrees  Extension: 0 degrees    R knee MMT  Flexion: 4/5  Extension: 4-/5    R hip MMT  Flexion: 4-/5  Extension: 4-/5  Abduction: 4/5  Adduction: 4/5    WB Status: WBAT and patient is  using an assistive device (B axillary crutches), slight off balance, still occasionally getting 3 pt touch with crutches and legs, education on heel strike    Swelling: mild swelling in the R knee    Radicular Symptoms: numbness occasionally around the joint line    Tenderness: medial and lateral joint line R knee, medial distal hamstring       Assessment & Plan  "      Assessment  Impairments: abnormal gait, abnormal or restricted ROM, activity intolerance, impaired balance, impaired physical strength, lacks appropriate home exercise program and pain with function   Functional limitations: lifting, sleeping, walking, uncomfortable because of pain, sitting, standing, stooping and unable to perform repetitive tasks   Assessment details: Ronald is progressing, but he is still having increased pain levels.  The back of the knee is very tight.  He \"feels like something is going to blow\".  Added in further stretching today to help with the hamstring tightness.  The joint line both medially and laterally is TTP.   Swelling is still present around the R knee.   ROM is improving in both directions, as well as strength, but still room to go.  He still has a significant limp and unable to get full extension during ambulation.  He goes back to ortho 4/29/25.  Patient will continue to benefit from further PT services to address their remaining deficits noted and progress to achieve their goals.       Goals  Plan Goals: KNEE PROBLEMS:     1. The patient has limited ROM of the right knee.   LTG 1: 12 weeks:  The patient will demonstrate 0 to 120 degrees of ROM for the right knee in order to allow patient to perform ADLs and navigate stairs.    STATUS:  Partially met   STG 1a: 4 weeks:  The patient will demonstrate -3 to 105 degrees of ROM for the right knee.    STATUS:  Partially met   TREATMENT: Manual therapy, therapeutic exercise, home exercise instruction, and modalities as needed to include:  moist heat, electrical stimulation, ultrasound, and ice.    2. The patient has limited strength of the right knee.   LTG 2: 12 weeks: The patient will demonstrate 4+ /5 strength for right knee flexion and extension in order to allow patient improved joint stability    STATUS:  Progressing   STG 2a: 4 weeks: The patient will demonstrate 4 /5 strength for right knee flexion and extension    STATUS:  " Progressing  STG2b:  4 weeks:  The patient will be independent with home exercises.     STATUS:  Progressing   TREATMENT: Manual therapy, therapeutic exercise, home exercise instruction, aquatic therapy, and modalities as needed to include:  moist heat, electrical stimulation, ultrasound, and ice.     3. The patient has gait dysfunction.   LTG 3: 12 weeks:  The patient will ambulate without assistive device, independently, for community distances with minimal limp to the right lower extremity in order to improve mobility and allow patient to perform activities such as grocery shopping with greater ease.    STATUS:  Progressing   TREATMENT: Gait training, aquatic therapy, therapeutic exercise, and home exercise instruction.    4. Mobility: Walking/Moving Around Functional Limitation     LTG 4: 12 weeks:  The patient will demonstrate decreased limitation by achieving a score of 55/80 on the LEFS.    STATUS:  Progressing   STG 4 a: 4 weeks:  The patient will demonstrate decreased limitation by achieving a score of 25/80 on the LEFS.      STATUS:  MET   TREATMENT:  Manual therapy, therapeutic exercise, home exercise instruction, and modalities as needed.      Plan  Therapy options: will be seen for skilled therapy services  Planned modality interventions: cryotherapy, dry needling, electrical stimulation/Russian stimulation, TENS and thermotherapy (hydrocollator packs)  Planned therapy interventions: ADL retraining, balance/weight-bearing training, body mechanics training, flexibility, functional ROM exercises, gait training, home exercise program, IADL retraining, joint mobilization, manual therapy, neuromuscular re-education, postural training, soft tissue mobilization, strengthening, stretching and therapeutic activities  Frequency: 2x week  Duration in weeks: 8  Treatment plan discussed with: patient  Plan details: Review HEP, update as needed.    Progress with R knee and BLE strength, trunk control/postural  awareness, balance and gait training, coordination, increased stamina, decreased tightness, improved ROM/flexibility, education as needed.          Progress toward previous goals: Partially Met      Recommendations: Continue as planned  Timeframe: 2 months  Prognosis to achieve goals: good    Timed:  Manual Therapy:    10     mins  73417;  Therapeutic Exercise:    10     mins  21045;     Neuromuscular Mamie:   10    mins  69400;    Therapeutic Activity:          mins  81723;     Gait Training:           mins  93211;     Ultrasound:                          mins  47712;  Self Care:           mins  44565          Un-timed:  Electrical Stimulation:         mins  23788 ( );  Dry Needling          mins self-pay;  Mechanical Traction           mins  36797;  Low Eval          mins 74293;  Moderate Eval          mins 15041;  High Eval          mins 18888;  Re-Eval          mins 48411;  Canalith Repos         mins 45808      Timed Treatment:   30   mins   Total Treatment:     30   mins      PT SIGNATURE: Emy Cordova PT, DPT  [place] License: [id number]       Certification Period: 4/18/2025 thru 7/16/2025  I certify that the therapy services are furnished while this patient is under my care.  The services outlined above are required by this patient, and will be reviewed every 90 days.     PHYSICIAN: Devon Camejo MD  NPI: [id number]      PHYSICIAN PRINT NAME: ______________________________________________      PHYSICIAN SIGNATURE: ______________________________________________         DATE:________________________________        Please sign and return via fax to [phone].  Thank you, [facility].

## 2025-04-21 ENCOUNTER — TREATMENT (OUTPATIENT)
Dept: PHYSICAL THERAPY | Facility: CLINIC | Age: 55
End: 2025-04-21
Payer: COMMERCIAL

## 2025-04-21 DIAGNOSIS — M25.561 ACUTE PAIN OF RIGHT KNEE: Primary | ICD-10-CM

## 2025-04-21 DIAGNOSIS — Z87.828 S/P ARTHROSCOPIC PARTIAL LATERAL MENISCECTOMY OF RIGHT KNEE: ICD-10-CM

## 2025-04-21 DIAGNOSIS — M25.661 DECREASED RANGE OF MOTION OF RIGHT KNEE: ICD-10-CM

## 2025-04-21 DIAGNOSIS — Z98.890 S/P ARTHROSCOPIC PARTIAL LATERAL MENISCECTOMY OF RIGHT KNEE: ICD-10-CM

## 2025-04-21 DIAGNOSIS — R29.898 WEAKNESS OF RIGHT LEG: ICD-10-CM

## 2025-04-21 PROCEDURE — 97110 THERAPEUTIC EXERCISES: CPT | Performed by: PHYSICAL THERAPIST

## 2025-04-21 PROCEDURE — 97530 THERAPEUTIC ACTIVITIES: CPT | Performed by: PHYSICAL THERAPIST

## 2025-04-21 NOTE — PROGRESS NOTES
Outpatient Physical Therapy                   Physical Therapy Daily Treatment Note    Patient: Rohith Henley   : 1970  Diagnosis/ICD-10 Code:  Acute pain of right knee [M25.561]  Referring practitioner: Devon Camejo MD  Date of Initial Visit: Type: THERAPY  Noted: 3/20/2025  Today's Date: 2025  Patient seen for 9 sessions             Subjective   Rohith Henley reports: he just has his usual pain.     Pain: 3/10 pain, at time of arrival.     Objective     See Exercise, Manual, and Modality Logs for complete treatment.     Assessment/Plan  Pt tolerated exercises well with minimal difficulties; may progress exercises as tolerated in up coming sessions. Pt would benefit from skilled PT to address Range of Motion  and Strength deficits, pain management and any concerns with ADLs.     Progress per Plan of Care      Timed:  Manual Therapy:    0     mins  78478;  Therapeutic Exercise:    20     mins  08563;     Neuromuscular Mamie:    0    mins  81138;    Therapeutic Activity:     8     mins  18630;     Self care:                       0     mins  73088;  Gait Trainin     mins  03282;       Ultrasound:                    0     mins  68248;      Untimed:  Mechanical Traction:    0     mins  44592;   Electrical Stimulation:    0     mins  24397 ( );      Timed Treatment:   28   mins   Total Treatment:     28   mins      Electronically signed:   Mackenzie Whitten PTA  Physical Therapist Assistant  [facility] License #: [id number]

## 2025-04-24 ENCOUNTER — TREATMENT (OUTPATIENT)
Dept: PHYSICAL THERAPY | Facility: CLINIC | Age: 55
End: 2025-04-24
Payer: COMMERCIAL

## 2025-04-24 DIAGNOSIS — M25.561 ACUTE PAIN OF RIGHT KNEE: Primary | ICD-10-CM

## 2025-04-24 DIAGNOSIS — R29.898 WEAKNESS OF RIGHT LEG: ICD-10-CM

## 2025-04-24 DIAGNOSIS — Z87.828 S/P ARTHROSCOPIC PARTIAL LATERAL MENISCECTOMY OF RIGHT KNEE: ICD-10-CM

## 2025-04-24 DIAGNOSIS — Z98.890 S/P ARTHROSCOPIC PARTIAL LATERAL MENISCECTOMY OF RIGHT KNEE: ICD-10-CM

## 2025-04-24 DIAGNOSIS — M25.661 DECREASED RANGE OF MOTION OF RIGHT KNEE: ICD-10-CM

## 2025-04-24 NOTE — PROGRESS NOTES
Physical Therapy Daily Treatment Note  [facility], [address], [place] [address]      Patient: Rohith Henley   : 1970  Referring practitioner: Devon Camejo MD  Date of Initial Visit: Type: THERAPY  Noted: 3/20/2025  Today's Date: 2025  Patient seen for 10 sessions           Visit Diagnoses:    ICD-10-CM ICD-9-CM   1. Acute pain of right knee  M25.561 719.46   2. S/P arthroscopic partial lateral meniscectomy of right knee  Z98.890 V45.89    Z87.828 V15.59   3. Decreased range of motion of right knee  M25.661 719.56   4. Weakness of right leg  R29.898 729.89       Subjective Evaluation    History of Present Illness  Mechanism of injury: Ronald is wondering if this is as good as his knee is going to get.  He came to PT on Monday, he says the knew blew up and had increased swelling in the R knee.  He goes back to ortho 25.            Objective   See Exercise, Manual, and Modality Logs for complete treatment.       Assessment & Plan       Assessment  Assessment details: Progressed further with Ronald's R knee strengthening, balance work, quad control, and dynamic activities.  He is able to tolerate them, but some difficulty with the pain.  Swelling comes and goes around the R knee.  He is still having tightness at end ranges, along with pain.  He has difficulty with getting knee extended during gait.           Progress per Plan of Care and Progress strengthening /stabilization /functional activity             Timed:  Manual Therapy:    8     mins  82587;  Therapeutic Exercise:    9     mins  07408;     Neuromuscular Mamie:   8    mins  89786;    Therapeutic Activity:     8     mins  10209;     Gait Training:           mins  16119;     Ultrasound:                          mins  85045;  Self Care:           mins  89494          Un-timed:  Electrical Stimulation:         mins  17347 ( );  Dry Needling          mins self-pay;  Mechanical Traction           mins  89967;  Low Eval           mins 06825;  Moderate Eval          mins 89187;  High Eval          mins 06069;  Re-Eval          mins 19751;  Canalith Repos         mins 18048      Timed Treatment:   33   mins   Total Treatment:     33   mins    Emy Cordova PT, DPT  [place] License #: [id number]

## 2025-04-28 ENCOUNTER — TREATMENT (OUTPATIENT)
Dept: PHYSICAL THERAPY | Facility: CLINIC | Age: 55
End: 2025-04-28
Payer: COMMERCIAL

## 2025-04-28 DIAGNOSIS — M25.561 ACUTE PAIN OF RIGHT KNEE: Primary | ICD-10-CM

## 2025-04-28 DIAGNOSIS — Z98.890 S/P ARTHROSCOPIC PARTIAL LATERAL MENISCECTOMY OF RIGHT KNEE: ICD-10-CM

## 2025-04-28 DIAGNOSIS — Z87.828 S/P ARTHROSCOPIC PARTIAL LATERAL MENISCECTOMY OF RIGHT KNEE: ICD-10-CM

## 2025-04-28 DIAGNOSIS — M25.661 DECREASED RANGE OF MOTION OF RIGHT KNEE: ICD-10-CM

## 2025-04-28 DIAGNOSIS — R29.898 WEAKNESS OF RIGHT LEG: ICD-10-CM

## 2025-04-28 PROCEDURE — 97530 THERAPEUTIC ACTIVITIES: CPT | Performed by: PHYSICAL THERAPIST

## 2025-04-28 PROCEDURE — 97110 THERAPEUTIC EXERCISES: CPT | Performed by: PHYSICAL THERAPIST

## 2025-04-28 PROCEDURE — 97112 NEUROMUSCULAR REEDUCATION: CPT | Performed by: PHYSICAL THERAPIST

## 2025-04-28 NOTE — PROGRESS NOTES
Physical Therapy Daily Treatment Note  [facility], [address], [place] [address]      Patient: Rohith Henley   : 1970  Referring practitioner: Devon Camejo MD  Date of Initial Visit: Type: THERAPY  Noted: 3/20/2025  Today's Date: 2025  Patient seen for 11 sessions           Subjective Evaluation    History of Present Illness    Subjective comment: 4/10 in the R knee       Objective   See Exercise, Manual, and Modality Logs for complete treatment.       Assessment & Plan       Assessment  Assessment details: Pt cont to have moderate pain in the R knee at this visit. There is some balance issues seen with foam and rocker board activities. Pt was able to tolerate more weight on the single leg press but shaking with the action was noted. Pt is to see the MD tomorrow.           Visit Diagnoses:    ICD-10-CM ICD-9-CM   1. Acute pain of right knee  M25.561 719.46   2. Decreased range of motion of right knee  M25.661 719.56   3. S/P arthroscopic partial lateral meniscectomy of right knee  Z98.890 V45.89    Z87.828 V15.59   4. Weakness of right leg  R29.898 729.89       Progress per Plan of Care    Timed:    Therapeutic Exercise:    10     mins  30776;  Manual Therapy:         mins  27210;     Neuromuscular Mamie:   11    mins  02234;    Therapeutic Activity:     14     mins  27759;     Gait Training:           mins  96011;     Ultrasound:          mins  04009;      Untimed:  Electrical Stimulation:         mins  42647 ( );  Mechanical Traction:         mins  23998;   Group           mins  10551    Timed Treatment:   35   mins   Total Treatment:     37   mins      Leigha Diallo PTA  Physical Therapist Assistant

## 2025-04-29 ENCOUNTER — OFFICE VISIT (OUTPATIENT)
Dept: ORTHOPEDIC SURGERY | Facility: CLINIC | Age: 55
End: 2025-04-29
Payer: COMMERCIAL

## 2025-04-29 VITALS
HEIGHT: 73 IN | OXYGEN SATURATION: 96 % | DIASTOLIC BLOOD PRESSURE: 79 MMHG | WEIGHT: 240 LBS | SYSTOLIC BLOOD PRESSURE: 145 MMHG | HEART RATE: 78 BPM | BODY MASS INDEX: 31.81 KG/M2

## 2025-04-29 DIAGNOSIS — M25.561 RIGHT KNEE PAIN, UNSPECIFIED CHRONICITY: ICD-10-CM

## 2025-04-29 DIAGNOSIS — S83.271A COMPLEX TEAR OF LATERAL MENISCUS OF RIGHT KNEE AS CURRENT INJURY, INITIAL ENCOUNTER: ICD-10-CM

## 2025-04-29 DIAGNOSIS — Z48.89 AFTERCARE FOLLOWING SURGERY: Primary | ICD-10-CM

## 2025-04-29 DIAGNOSIS — M17.11 PRIMARY OSTEOARTHRITIS OF RIGHT KNEE: ICD-10-CM

## 2025-04-29 PROCEDURE — 99024 POSTOP FOLLOW-UP VISIT: CPT | Performed by: PHYSICIAN ASSISTANT

## 2025-04-29 RX ORDER — MELOXICAM 15 MG/1
15 TABLET ORAL DAILY
Qty: 30 TABLET | Refills: 2 | Status: SHIPPED | OUTPATIENT
Start: 2025-04-29 | End: 2025-05-02 | Stop reason: SDUPTHER

## 2025-04-29 NOTE — PROGRESS NOTES
"Chief Complaint  Follow-up of the Right Knee    Subjective          History of Present Illness      Rohith Henley is a 54 y.o. male     History of Present Illness  The patient presents for a follow-up of right knee arthroscopic partial lateral meniscectomy and chondroplasty performed on 03/17/2025.    He reports persistent pain in the medial aspect of his right knee, accompanied by intermittent swelling. He is not experiencing any systemic symptoms such as fever or chills. His pain management strategy involves the use of analgesics on an as-needed basis, which he attempts to minimize. He has been actively participating in physical therapy sessions at [facility]. Currently, he is not engaged in any occupational activities.        No Known Allergies     Social History     Socioeconomic History    Marital status:    Tobacco Use    Smoking status: Never    Smokeless tobacco: Never   Vaping Use    Vaping status: Never Used   Substance and Sexual Activity    Alcohol use: No    Drug use: Never    Sexual activity: Defer        REVIEW OF SYSTEMS    Constitutional: Awake alert and oriented x3, no acute distress, denies fevers, chills, weight loss  Respiratory: No respiratory distress  Vascular: Brisk cap refill, Intact distal pulses, No cyanosis, compartments soft with no signs or symptoms of compartment syndrome or DVT.   Cardiovascular: Denies chest pain, shortness of breath  Skin: Denies rashes, acute skin changes  Neurologic: Denies headache, loss of consciousness  MSK: Right knee pain      Objective   Vital Signs:   /79   Pulse 78   Ht 185.4 cm (72.99\")   Wt 109 kg (240 lb)   SpO2 96%   BMI 31.67 kg/m²     Body mass index is 31.67 kg/m².         Physical Exam  The right knee has full extension, flexion to 115 degrees, mild pain with range of motion, and is stable to varus/valgus stress. The knee is nontender. Calf shows negative Nena testing. The incisions on the knee are well healed " with no erythema, ecchymosis or swelling.        Procedures    Imaging Results (Most Recent)       None                     Assessment and Plan    Diagnoses and all orders for this visit:    1. Aftercare following surgery right knee arthroscopy with partial lateral menisectomy and chondroplasty performed on 03/17/25. (Primary)    2. Right knee pain, unspecified chronicity  -     meloxicam (MOBIC) 15 MG tablet; Take 1 tablet by mouth Daily.  Dispense: 30 tablet; Refill: 2    3. Primary osteoarthritis of right knee  -     meloxicam (MOBIC) 15 MG tablet; Take 1 tablet by mouth Daily.  Dispense: 30 tablet; Refill: 2    4. Complex tear of lateral meniscus of right knee as current injury, initial encounter  -     meloxicam (MOBIC) 15 MG tablet; Take 1 tablet by mouth Daily.  Dispense: 30 tablet; Refill: 2        Assessment & Plan  1. Post-operative status following right knee arthroscopic partial lateral meniscectomy and chondroplasty.  Discussed diagnosis and treatment options with the patient, he is advised to continue with physical therapy. A prescription refill for meloxicam 15 mg daily has been provided for its anti-inflammatory benefits. He is advised to remain off work.    Follow-up  The patient will follow up in 6 weeks for a recheck.    PROCEDURE  The patient underwent a right knee arthroscopic partial lateral meniscectomy and chondroplasty on 03/17/2025.      Call or return if worsening symptoms.    Follow Up   Return in about 6 weeks (around 6/10/2025) for Recheck.  Patient was given instructions and counseling regarding his condition or for health maintenance advice. Please see specific information pulled into the AVS if appropriate.       Contains text text generated by [employer]ATHY [employer]  EMR Dragon/Transcription disclaimer:  Part of this note may be an electronic transcription/translation of spoken language to printed text using the Dragon Dictation System

## 2025-05-01 ENCOUNTER — TREATMENT (OUTPATIENT)
Dept: PHYSICAL THERAPY | Facility: CLINIC | Age: 55
End: 2025-05-01
Payer: COMMERCIAL

## 2025-05-01 DIAGNOSIS — M25.561 ACUTE PAIN OF RIGHT KNEE: Primary | ICD-10-CM

## 2025-05-01 DIAGNOSIS — R29.898 WEAKNESS OF RIGHT LEG: ICD-10-CM

## 2025-05-01 DIAGNOSIS — Z87.828 S/P ARTHROSCOPIC PARTIAL LATERAL MENISCECTOMY OF RIGHT KNEE: ICD-10-CM

## 2025-05-01 DIAGNOSIS — M25.661 DECREASED RANGE OF MOTION OF RIGHT KNEE: ICD-10-CM

## 2025-05-01 DIAGNOSIS — Z98.890 S/P ARTHROSCOPIC PARTIAL LATERAL MENISCECTOMY OF RIGHT KNEE: ICD-10-CM

## 2025-05-01 NOTE — PROGRESS NOTES
Physical Therapy Daily Treatment Note  [facility], [address], [place] [address]      Patient: Rohith Henley   : 1970  Referring practitioner: Devon Camejo MD  Date of Initial Visit: Type: THERAPY  Noted: 3/20/2025  Today's Date: 2025  Patient seen for 12 sessions           Visit Diagnoses:    ICD-10-CM ICD-9-CM   1. Acute pain of right knee  M25.561 719.46   2. Decreased range of motion of right knee  M25.661 719.56   3. S/P arthroscopic partial lateral meniscectomy of right knee  Z98.890 V45.89    Z87.828 V15.59   4. Weakness of right leg  R29.898 729.89       Subjective Evaluation    History of Present Illness    Subjective comment: 4/10 medial knee pain on the R.  Saw ortho office, continue with further PT.         Objective   See Exercise, Manual, and Modality Logs for complete treatment.       Assessment & Plan       Assessment  Assessment details: Progressed further with strengthening, balance on the RLE.  Increased weakness and shakiness noted.  The knee still wants to lock when he steps up.  Swelling is still present around the R knee.           Progress per Plan of Care and Progress strengthening /stabilization /functional activity             Timed:  Manual Therapy:         mins  80052;  Therapeutic Exercise:    8     mins  84840;     Neuromuscular Mamie:   8    mins  90134;    Therapeutic Activity:     8     mins  10533;     Gait Trainin     mins  85970;     Ultrasound:                          mins  75032;  Self Care:           mins  58611          Un-timed:  Electrical Stimulation:         mins  39354 ( );  Dry Needling          mins self-pay;  Mechanical Traction           mins  19594;  Low Eval          mins 17233;  Moderate Eval          mins 73838;  High Eval          mins 80700;  Re-Eval          mins 74111;  Canalith Repos         mins 09815      Timed Treatment:   32   mins   Total Treatment:     32   mins    Emy Cordova PT, DPT  [place] License #:  [id number]

## 2025-05-02 ENCOUNTER — TELEPHONE (OUTPATIENT)
Dept: ORTHOPEDIC SURGERY | Facility: CLINIC | Age: 55
End: 2025-05-02
Payer: COMMERCIAL

## 2025-05-02 RX ORDER — MELOXICAM 15 MG/1
15 TABLET ORAL DAILY
Qty: 90 TABLET | Refills: 0 | Status: SHIPPED | OUTPATIENT
Start: 2025-05-02 | End: 2025-07-31

## 2025-05-02 NOTE — TELEPHONE ENCOUNTER
PATIENT CALLED STATING HIS SCRIPT FOR MELOXICAM WAS SUPPOSE TO GO TO [facility] IN [facility]. IT WAS SENT TO [facility].

## 2025-05-05 ENCOUNTER — TREATMENT (OUTPATIENT)
Dept: PHYSICAL THERAPY | Facility: CLINIC | Age: 55
End: 2025-05-05
Payer: COMMERCIAL

## 2025-05-05 DIAGNOSIS — R29.898 WEAKNESS OF RIGHT LEG: ICD-10-CM

## 2025-05-05 DIAGNOSIS — Z98.890 S/P ARTHROSCOPIC PARTIAL LATERAL MENISCECTOMY OF RIGHT KNEE: ICD-10-CM

## 2025-05-05 DIAGNOSIS — M25.561 ACUTE PAIN OF RIGHT KNEE: Primary | ICD-10-CM

## 2025-05-05 DIAGNOSIS — M25.661 DECREASED RANGE OF MOTION OF RIGHT KNEE: ICD-10-CM

## 2025-05-05 DIAGNOSIS — Z87.828 S/P ARTHROSCOPIC PARTIAL LATERAL MENISCECTOMY OF RIGHT KNEE: ICD-10-CM

## 2025-05-05 PROCEDURE — 97530 THERAPEUTIC ACTIVITIES: CPT | Performed by: PHYSICAL THERAPIST

## 2025-05-05 PROCEDURE — 97140 MANUAL THERAPY 1/> REGIONS: CPT | Performed by: PHYSICAL THERAPIST

## 2025-05-05 PROCEDURE — 97110 THERAPEUTIC EXERCISES: CPT | Performed by: PHYSICAL THERAPIST

## 2025-05-05 NOTE — PROGRESS NOTES
Outpatient Physical Therapy                   Physical Therapy Daily Treatment Note    Patient: Roihth Henley   : 1970  Diagnosis/ICD-10 Code:  Acute pain of right knee [M25.561]  Referring practitioner: Devon Camejo MD  Date of Initial Visit: Type: THERAPY  Noted: 3/20/2025  Today's Date: 2025  Patient seen for 13 sessions             Subjective   Rohith Henley reports: his leg was hurting a little during the night and this morning. It felt tight so he massaged it and it helped some. He drove to [place] at 9:30 this morning and during the drive his knee throbbed at knee hurting so on his way back he had to use the tahmina control so he could extend his knee.     Pain: 6/10 pain, at time of arrival.     Objective     See Exercise, Manual, and Modality Logs for complete treatment.     Assessment/Plan  Pt seemed to have pain with step ups today but states its was okay. Pt states he feels a little better after manual therapy and was educated how to work on the muscles at home. Pt would benefit from skilled PT to address Range of Motion  and Strength deficits, pain management and any concerns with ADLs.       Progress per Plan of Care      Timed:  Manual Therapy:    9     mins  72372;  Therapeutic Exercise:    11     mins  34472;     Neuromuscular Mamie:    0    mins  82843;    Therapeutic Activity:     10     mins  03989;     Self care:                       0     mins  12574;  Gait Trainin     mins  10600;       Ultrasound:                    0     mins  47663;      Untimed:  Mechanical Traction:    0     mins  75719;   Electrical Stimulation:    0     mins  99775 ( );      Timed Treatment:   30   mins   Total Treatment:     30   mins      Electronically signed:   Mackenzie Whitten PTA  Physical Therapist Assistant  [facility] License #: [id number]

## 2025-05-07 ENCOUNTER — TREATMENT (OUTPATIENT)
Dept: PHYSICAL THERAPY | Facility: CLINIC | Age: 55
End: 2025-05-07
Payer: COMMERCIAL

## 2025-05-07 DIAGNOSIS — M25.661 DECREASED RANGE OF MOTION OF RIGHT KNEE: ICD-10-CM

## 2025-05-07 DIAGNOSIS — Z87.828 S/P ARTHROSCOPIC PARTIAL LATERAL MENISCECTOMY OF RIGHT KNEE: ICD-10-CM

## 2025-05-07 DIAGNOSIS — Z98.890 S/P ARTHROSCOPIC PARTIAL LATERAL MENISCECTOMY OF RIGHT KNEE: ICD-10-CM

## 2025-05-07 DIAGNOSIS — R29.898 WEAKNESS OF RIGHT LEG: ICD-10-CM

## 2025-05-07 DIAGNOSIS — M25.561 ACUTE PAIN OF RIGHT KNEE: Primary | ICD-10-CM

## 2025-05-07 NOTE — PROGRESS NOTES
Outpatient Physical Therapy                   Physical Therapy Daily Treatment Note    Patient: Rohith Henley   : 1970  Diagnosis/ICD-10 Code:  Acute pain of right knee [M25.561]  Referring practitioner: Devon Camejo MD  Date of Initial Visit: Type: THERAPY  Noted: 3/20/2025  Today's Date: 2025  Patient seen for 14 sessions             Subjective   Rohith Henley reports: 2-3/10 pain, at time of arrival in the right knee.     Objective     See Exercise, Manual, and Modality Logs for complete treatment.     Assessment/Plan  Rohith demonstrated good tolerance to all functional lower strengthening . Pt was challenged by balance exercises; especially with single leg stance on the rocker board so one finger touch was added to ensure safety. Continue to progress with current PT plan of care per patient tolerance.       Progress per Plan of Care      Timed:  Manual Therapy:    0     mins  57886;  Therapeutic Exercise:    23     mins  13603;     Neuromuscular Mamie:    8    mins  11154;    Therapeutic Activity:     11     mins  55545;     Self care:                       0     mins  82740;  Gait Trainin     mins  57689;       Ultrasound:                    0     mins  50366;      Untimed:  Mechanical Traction:    0     mins  81121;   Electrical Stimulation:    0     mins  27037 ( );      Timed Treatment:   42   mins   Total Treatment:     42   mins      Electronically signed:   Mackenzie Whitten PTA  Physical Therapist Assistant  [facility] License #: [id number]

## 2025-05-08 ENCOUNTER — TELEPHONE (OUTPATIENT)
Dept: ORTHOPEDIC SURGERY | Facility: CLINIC | Age: 55
End: 2025-05-08

## 2025-05-08 NOTE — TELEPHONE ENCOUNTER
Caller:  MYRIAM   Relationship to Patient: SELF  Phone Number: [phone]  Reason for Call: PATIENT NEEDING AN EXTENSION ON HIS FMLA PAPERWORK UNTIL HIS 06/12/25 APPT

## 2025-05-12 ENCOUNTER — TREATMENT (OUTPATIENT)
Dept: PHYSICAL THERAPY | Facility: CLINIC | Age: 55
End: 2025-05-12
Payer: COMMERCIAL

## 2025-05-12 DIAGNOSIS — Z87.828 S/P ARTHROSCOPIC PARTIAL LATERAL MENISCECTOMY OF RIGHT KNEE: ICD-10-CM

## 2025-05-12 DIAGNOSIS — M25.561 ACUTE PAIN OF RIGHT KNEE: Primary | ICD-10-CM

## 2025-05-12 DIAGNOSIS — M25.661 DECREASED RANGE OF MOTION OF RIGHT KNEE: ICD-10-CM

## 2025-05-12 DIAGNOSIS — R29.898 WEAKNESS OF RIGHT LEG: ICD-10-CM

## 2025-05-12 DIAGNOSIS — Z98.890 S/P ARTHROSCOPIC PARTIAL LATERAL MENISCECTOMY OF RIGHT KNEE: ICD-10-CM

## 2025-05-12 PROCEDURE — 97110 THERAPEUTIC EXERCISES: CPT | Performed by: PHYSICAL THERAPIST

## 2025-05-12 PROCEDURE — 97530 THERAPEUTIC ACTIVITIES: CPT | Performed by: PHYSICAL THERAPIST

## 2025-05-12 NOTE — PROGRESS NOTES
Physical Therapy Daily Treatment Note  [facility], [address], [place] [address]      Patient: Rohith Henley   : 1970  Referring practitioner: Devon Camejo MD  Date of Initial Visit: Type: THERAPY  Noted: 3/20/2025  Today's Date: 2025  Patient seen for 15 sessions           Subjective Evaluation    History of Present Illness    Subjective comment: 4/10 in the R knee       Objective   See Exercise, Manual, and Modality Logs for complete treatment.       Assessment & Plan       Assessment  Assessment details: Pt had great difficulty with SL TG squats. Pt showed fatigue tremors and exertions. There is good control of all other activities with the lateral step ups being slightly difficult. There is still not full extension in the R knee.          Visit Diagnoses:    ICD-10-CM ICD-9-CM   1. Acute pain of right knee  M25.561 719.46   2. Decreased range of motion of right knee  M25.661 719.56   3. S/P arthroscopic partial lateral meniscectomy of right knee  Z98.890 V45.89    Z87.828 V15.59   4. Weakness of right leg  R29.898 729.89       Progress per Plan of Care    Timed:    Therapeutic Exercise:    12     mins  29269;  Manual Therapy:         mins  76786;     Neuromuscular Mamie:       mins  74778;    Therapeutic Activity:     13     mins  24860;     Gait Training:           mins  44622;     Ultrasound:          mins  08019;      Untimed:  Electrical Stimulation:         mins  43468 ( );  Mechanical Traction:         mins  74054;   Group           mins  89801    Timed Treatment:   25   mins   Total Treatment:     27   mins      Leigha Diallo PTA  Physical Therapist Assistant

## 2025-05-14 ENCOUNTER — TREATMENT (OUTPATIENT)
Dept: PHYSICAL THERAPY | Facility: CLINIC | Age: 55
End: 2025-05-14
Payer: COMMERCIAL

## 2025-05-14 DIAGNOSIS — Z98.890 S/P ARTHROSCOPIC PARTIAL LATERAL MENISCECTOMY OF RIGHT KNEE: ICD-10-CM

## 2025-05-14 DIAGNOSIS — M25.561 ACUTE PAIN OF RIGHT KNEE: Primary | ICD-10-CM

## 2025-05-14 DIAGNOSIS — M25.661 DECREASED RANGE OF MOTION OF RIGHT KNEE: ICD-10-CM

## 2025-05-14 DIAGNOSIS — R29.898 WEAKNESS OF RIGHT LEG: ICD-10-CM

## 2025-05-14 DIAGNOSIS — Z87.828 S/P ARTHROSCOPIC PARTIAL LATERAL MENISCECTOMY OF RIGHT KNEE: ICD-10-CM

## 2025-05-14 NOTE — PROGRESS NOTES
Outpatient Physical Therapy                   Physical Therapy Daily Treatment Note    Patient: Rohith Henley   : 1970  Diagnosis/ICD-10 Code:  Acute pain of right knee [M25.561]  Referring practitioner: Devon Camejo MD  Date of Initial Visit: Type: THERAPY  Noted: 3/20/2025  Today's Date: 2025  Patient seen for 16 sessions             Subjective   Rohith Henley reports: his knee is hurting more today than normal. Pt states his knee is aching but he has a different pain in his medial knee.     Pain: 4/10 pain, at time of arrival.     Objective     See Exercise, Manual, and Modality Logs for complete treatment.     Assessment/Plan  Pt arrived stiff and hurting more than usual. Pt experienced some popping near the knee cap when extending after knee flexion stretches. Medial patellar glide stopped the popping so k-tape was applied to help improve tracking. Skin was prepped with alcohol but tape didn't seem to stick very well. Plan to see if patient noticed any changes with tape next session.      Progress per Plan of Care      Timed:  Manual Therapy:    23     mins  53485;  Therapeutic Exercise:    12     mins  87222;     Neuromuscular Mamie:    0    mins  33676;    Therapeutic Activity:     0     mins  72783;     Self care:                       0     mins  35167;  Gait Trainin     mins  73802;       Ultrasound:                    0     mins  90518;      Untimed:  Mechanical Traction:    0     mins  48230;   Electrical Stimulation:    0     mins  68476 ( );      Timed Treatment:   35   mins   Total Treatment:     35   mins      Electronically signed:   Mackenzie Whitten PTA  Physical Therapist Assistant  [facility] License #: [id number]

## 2025-05-19 ENCOUNTER — TREATMENT (OUTPATIENT)
Dept: PHYSICAL THERAPY | Facility: CLINIC | Age: 55
End: 2025-05-19
Payer: COMMERCIAL

## 2025-05-19 DIAGNOSIS — R29.898 WEAKNESS OF RIGHT LEG: ICD-10-CM

## 2025-05-19 DIAGNOSIS — Z98.890 S/P ARTHROSCOPIC PARTIAL LATERAL MENISCECTOMY OF RIGHT KNEE: ICD-10-CM

## 2025-05-19 DIAGNOSIS — Z87.828 S/P ARTHROSCOPIC PARTIAL LATERAL MENISCECTOMY OF RIGHT KNEE: ICD-10-CM

## 2025-05-19 DIAGNOSIS — M25.661 DECREASED RANGE OF MOTION OF RIGHT KNEE: ICD-10-CM

## 2025-05-19 DIAGNOSIS — M25.561 ACUTE PAIN OF RIGHT KNEE: Primary | ICD-10-CM

## 2025-05-19 PROCEDURE — 97140 MANUAL THERAPY 1/> REGIONS: CPT | Performed by: PHYSICAL THERAPIST

## 2025-05-19 PROCEDURE — 97110 THERAPEUTIC EXERCISES: CPT | Performed by: PHYSICAL THERAPIST

## 2025-05-19 PROCEDURE — 97112 NEUROMUSCULAR REEDUCATION: CPT | Performed by: PHYSICAL THERAPIST

## 2025-05-19 NOTE — PROGRESS NOTES
Re-Assessment / Re-Certification  [facility], [address], [place] [address]      Patient: Rohith Henley   : 1970  Diagnosis/ICD-10 Code:  Acute pain of right knee [M25.561]  Referring practitioner: Devon Camejo MD  Date of Initial Visit: Type: THERAPY  Noted: 3/20/2025  Today's Date: 2025  Patient seen for 17 sessions      Subjective:     Visit Diagnoses:    ICD-10-CM ICD-9-CM   1. Acute pain of right knee  M25.561 719.46   2. Decreased range of motion of right knee  M25.661 719.56   3. S/P arthroscopic partial lateral meniscectomy of right knee  Z98.890 V45.89    Z87.828 V15.59   4. Weakness of right leg  R29.898 729.89       Clinical Progress: improved  Home Program Compliance: Yes  Treatment has included: therapeutic exercise, neuromuscular re-education, manual therapy, and therapeutic activity      Subjective Evaluation    History of Present Illness  Mechanism of injury: Patient's pain today is 3-4/10.    Overall, patient feels that PT has been helpful for him. He notes an improvement of 60-70% since starting therapy.    Patient has noted improvements with walking.    Patient is still having difficulty with the swelling.  He taped his knee last session, but he mowed the grass and his knee swelled up, the tape came off.  Stairs are still hard for him (descending is harder than ascending).     Returns back to the doctor 25.             Objective           General Comments     Knee Comments  R knee AROM  Flexion: 115 degrees  Extension: 0 degrees    R knee MMT  Flexion: 4+/5  Extension: 4/5    R hip MMT  Flexion: 4/5  Extension: 4-/5  Abduction: 4/5  Adduction: 4/5    WB Status: WBAT and patient is no longer using an assistive device    Swelling: mild swelling in the R knee    Radicular Symptoms: none noted    Tenderness: medial and lateral joint line R knee, medial side of patella       Assessment & Plan       Assessment  Impairments: abnormal gait, abnormal or  restricted ROM, activity intolerance, impaired balance, impaired physical strength, lacks appropriate home exercise program and pain with function   Functional limitations: lifting, sleeping, walking, uncomfortable because of pain, sitting, standing, stooping and unable to perform repetitive tasks   Assessment details: Ronald is progressing, but he is still having pain in the R knee.  The knee flexion has improved, extension is full.  He still is getting increased swelling in the R knee, especially with being on his feet or increased activity.  He gets a pop in the R knee when he moves from deep knee flexion back into extension. He still has a limp and unable to get full extension during ambulation.  He goes back to ortho 6/12/25.  Patient will continue to benefit from further PT services to address their remaining deficits noted and progress to achieve their goals.     Goals  Plan Goals: KNEE PROBLEMS:     1. The patient has limited ROM of the right knee.   LTG 1: 12 weeks:  The patient will demonstrate 0 to 120 degrees of ROM for the right knee in order to allow patient to perform ADLs and navigate stairs.    STATUS:  Partially met   STG 1a: 4 weeks:  The patient will demonstrate -3 to 105 degrees of ROM for the right knee.    STATUS:  MET   TREATMENT: Manual therapy, therapeutic exercise, home exercise instruction, and modalities as needed to include:  moist heat, electrical stimulation, ultrasound, and ice.    2. The patient has limited strength of the right knee.   LTG 2: 12 weeks: The patient will demonstrate 4+ /5 strength for right knee flexion and extension in order to allow patient improved joint stability    STATUS:  Progressing   STG 2a: 4 weeks: The patient will demonstrate 4 /5 strength for right knee flexion and extension    STATUS:  MET  STG2b:  4 weeks:  The patient will be independent with home exercises.     STATUS:  Progressing   TREATMENT: Manual therapy, therapeutic exercise, home exercise  instruction, aquatic therapy, and modalities as needed to include:  moist heat, electrical stimulation, ultrasound, and ice.     3. The patient has gait dysfunction.   LTG 3: 12 weeks:  The patient will ambulate without assistive device, independently, for community distances with minimal limp to the right lower extremity in order to improve mobility and allow patient to perform activities such as grocery shopping with greater ease.    STATUS:  Progressing   TREATMENT: Gait training, aquatic therapy, therapeutic exercise, and home exercise instruction.    4. Mobility: Walking/Moving Around Functional Limitation     LTG 4: 12 weeks:  The patient will demonstrate decreased limitation by achieving a score of 55/80 on the LEFS.    STATUS:  Progressing   STG 4 a: 4 weeks:  The patient will demonstrate decreased limitation by achieving a score of 25/80 on the LEFS.      STATUS:  MET   TREATMENT:  Manual therapy, therapeutic exercise, home exercise instruction, and modalities as needed.      Plan  Therapy options: will be seen for skilled therapy services  Planned modality interventions: cryotherapy, dry needling, electrical stimulation/Russian stimulation, TENS, thermotherapy (hydrocollator packs) and ultrasound  Planned therapy interventions: ADL retraining, balance/weight-bearing training, body mechanics training, flexibility, functional ROM exercises, gait training, home exercise program, IADL retraining, joint mobilization, manual therapy, neuromuscular re-education, postural training, soft tissue mobilization, strengthening, stretching and therapeutic activities  Frequency: 2x week  Duration in weeks: 4  Treatment plan discussed with: patient  Plan details: Review HEP, update as needed.    Progress with R knee and BLE strength, trunk control/postural awareness, balance and gait training, coordination, increased stamina, decreased tightness, improved ROM/flexibility, education as needed.          Progress toward  previous goals: Partially Met      Recommendations: Continue as planned  Timeframe: 1 month  Prognosis to achieve goals: good    Timed:  Manual Therapy:    11     mins  87423;  Therapeutic Exercise:    8     mins  38403;     Neuromuscular Mamie:   8    mins  48043;    Therapeutic Activity:          mins  37411;     Gait Training:           mins  89073;     Ultrasound:                          mins  46207;  Self Care:           mins  43857          Un-timed:  Electrical Stimulation:         mins  26305 (MC );  Dry Needling          mins self-pay;  Mechanical Traction           mins  20391;  Low Eval          mins 91433;  Moderate Eval          mins 50737;  High Eval          mins 98464;  Re-Eval          mins 22775;  Canalith Repos         mins 12692      Timed Treatment:   27   mins   Total Treatment:     30   mins        PT SIGNATURE: Emy Cordova PT, DPT  [place] License: [id number]        Certification Period: 5/19/2025 thru 8/16/2025  I certify that the therapy services are furnished while this patient is under my care.  The services outlined above are required by this patient, and will be reviewed every 90 days.     PHYSICIAN: Devon Camejo MD  NPI: [id number]      PHYSICIAN PRINT NAME: ______________________________________________      PHYSICIAN SIGNATURE: ______________________________________________         DATE:________________________________        Please sign and return via fax to [phone].  Thank you, [facility].

## 2025-05-22 ENCOUNTER — TELEPHONE (OUTPATIENT)
Dept: PHYSICAL THERAPY | Facility: CLINIC | Age: 55
End: 2025-05-22

## 2025-05-22 NOTE — TELEPHONE ENCOUNTER
"  Caller: Rohith Henley \"Ronald\"    Relationship: Self         What was the call regarding: HAS A FEAVER          "

## 2025-06-03 ENCOUNTER — TREATMENT (OUTPATIENT)
Dept: PHYSICAL THERAPY | Facility: CLINIC | Age: 55
End: 2025-06-03
Payer: COMMERCIAL

## 2025-06-03 DIAGNOSIS — M25.561 ACUTE PAIN OF RIGHT KNEE: Primary | ICD-10-CM

## 2025-06-03 DIAGNOSIS — Z98.890 S/P ARTHROSCOPIC PARTIAL LATERAL MENISCECTOMY OF RIGHT KNEE: ICD-10-CM

## 2025-06-03 DIAGNOSIS — R29.898 WEAKNESS OF RIGHT LEG: ICD-10-CM

## 2025-06-03 DIAGNOSIS — Z87.828 S/P ARTHROSCOPIC PARTIAL LATERAL MENISCECTOMY OF RIGHT KNEE: ICD-10-CM

## 2025-06-03 DIAGNOSIS — M25.661 DECREASED RANGE OF MOTION OF RIGHT KNEE: ICD-10-CM

## 2025-06-03 PROCEDURE — 97110 THERAPEUTIC EXERCISES: CPT | Performed by: PHYSICAL THERAPIST

## 2025-06-03 PROCEDURE — 97112 NEUROMUSCULAR REEDUCATION: CPT | Performed by: PHYSICAL THERAPIST

## 2025-06-03 PROCEDURE — 97530 THERAPEUTIC ACTIVITIES: CPT | Performed by: PHYSICAL THERAPIST

## 2025-06-03 NOTE — PROGRESS NOTES
Physical Therapy Daily Treatment Note  [facility], [address], [place] [address]      Patient: Rohith Henley   : 1970  Referring practitioner: Devon Camejo MD  Date of Initial Visit: Type: THERAPY  Noted: 3/20/2025  Today's Date: 6/3/2025  Patient seen for 18 sessions           Subjective Evaluation    History of Present Illness    Subjective comment: 2/10 in the R knee       Objective   See Exercise, Manual, and Modality Logs for complete treatment.       Assessment & Plan       Assessment  Assessment details: Ronald is not able to fully extend the R knee with amb or standing. There is strength deficits still present in the R knee. Pain is low at tx time. Pt was able to tolerate all activities without any c/o an increase in pain or discomfort.           Visit Diagnoses:    ICD-10-CM ICD-9-CM   1. Acute pain of right knee  M25.561 719.46   2. Decreased range of motion of right knee  M25.661 719.56   3. S/P arthroscopic partial lateral meniscectomy of right knee  Z98.890 V45.89    Z87.828 V15.59   4. Weakness of right leg  R29.898 729.89       Progress per Plan of Care    Timed:    Therapeutic Exercise:    10     mins  09396;  Manual Therapy:         mins  71019;     Neuromuscular Mamie:   8    mins  26193;    Therapeutic Activity:     12     mins  28755;     Gait Training:           mins  48347;     Ultrasound:          mins  39952;      Untimed:  Electrical Stimulation:         mins  03660 ( );  Mechanical Traction:         mins  60859;   Group           mins  80587    Timed Treatment:   30   mins   Total Treatment:     32   mins      Leigha Diallo PTA  Physical Therapist Assistant

## 2025-06-09 ENCOUNTER — TREATMENT (OUTPATIENT)
Dept: PHYSICAL THERAPY | Facility: CLINIC | Age: 55
End: 2025-06-09
Payer: COMMERCIAL

## 2025-06-09 DIAGNOSIS — M25.561 ACUTE PAIN OF RIGHT KNEE: Primary | ICD-10-CM

## 2025-06-09 DIAGNOSIS — Z98.890 S/P ARTHROSCOPIC PARTIAL LATERAL MENISCECTOMY OF RIGHT KNEE: ICD-10-CM

## 2025-06-09 DIAGNOSIS — R29.898 WEAKNESS OF RIGHT LEG: ICD-10-CM

## 2025-06-09 DIAGNOSIS — Z87.828 S/P ARTHROSCOPIC PARTIAL LATERAL MENISCECTOMY OF RIGHT KNEE: ICD-10-CM

## 2025-06-09 DIAGNOSIS — M25.661 DECREASED RANGE OF MOTION OF RIGHT KNEE: ICD-10-CM

## 2025-06-09 PROCEDURE — 97530 THERAPEUTIC ACTIVITIES: CPT | Performed by: PHYSICAL THERAPIST

## 2025-06-09 PROCEDURE — 97110 THERAPEUTIC EXERCISES: CPT | Performed by: PHYSICAL THERAPIST

## 2025-06-09 PROCEDURE — 97112 NEUROMUSCULAR REEDUCATION: CPT | Performed by: PHYSICAL THERAPIST

## 2025-06-09 NOTE — PROGRESS NOTES
Physical Therapy Daily Treatment Note  [facility], [address], [place] [address]      Patient: Rohith Henley   : 1970  Referring practitioner: Devon Camejo MD  Date of Initial Visit: Type: THERAPY  Noted: 3/20/2025  Today's Date: 2025  Patient seen for 19 sessions           Subjective Evaluation    History of Present Illness    Subjective comment: 2/10 in the R knee and states it comes and goes at times.       Objective   See Exercise, Manual, and Modality Logs for complete treatment.       Assessment & Plan       Assessment  Assessment details: Pt is performing exercises about the same as in the last few visits with the only area of struggle in the mini squats on upside down BOSU. There is no new signs of improvement at this visit and pt is able to tolerate all exercises.          Visit Diagnoses:    ICD-10-CM ICD-9-CM   1. Acute pain of right knee  M25.561 719.46   2. Decreased range of motion of right knee  M25.661 719.56   3. S/P arthroscopic partial lateral meniscectomy of right knee  Z98.890 V45.89    Z87.828 V15.59   4. Weakness of right leg  R29.898 729.89       Progress per Plan of Care    Timed:    Therapeutic Exercise:    10     mins  26963;  Manual Therapy:         mins  50290;     Neuromuscular Mamie:   10    mins  45841;    Therapeutic Activity:     10     mins  59468;     Gait Training:           mins  73952;     Ultrasound:          mins  91663;      Untimed:  Electrical Stimulation:         mins  51996 ( );  Mechanical Traction:         mins  89814;   Group           mins  60882    Timed Treatment:   30   mins   Total Treatment:     32   mins      Leigha Diallo PTA  Physical Therapist Assistant

## 2025-06-11 ENCOUNTER — TREATMENT (OUTPATIENT)
Dept: PHYSICAL THERAPY | Facility: CLINIC | Age: 55
End: 2025-06-11
Payer: COMMERCIAL

## 2025-06-11 DIAGNOSIS — Z87.828 S/P ARTHROSCOPIC PARTIAL LATERAL MENISCECTOMY OF RIGHT KNEE: ICD-10-CM

## 2025-06-11 DIAGNOSIS — Z98.890 S/P ARTHROSCOPIC PARTIAL LATERAL MENISCECTOMY OF RIGHT KNEE: ICD-10-CM

## 2025-06-11 DIAGNOSIS — M25.561 ACUTE PAIN OF RIGHT KNEE: Primary | ICD-10-CM

## 2025-06-11 DIAGNOSIS — R29.898 WEAKNESS OF RIGHT LEG: ICD-10-CM

## 2025-06-11 DIAGNOSIS — M25.661 DECREASED RANGE OF MOTION OF RIGHT KNEE: ICD-10-CM

## 2025-06-11 NOTE — PROGRESS NOTES
Physical Therapy Daily Treatment Note  [facility], [address], [place] [address]      Patient: Rohith Henley   : 1970  Referring practitioner: Devon Camejo MD  Date of Initial Visit: Type: THERAPY  Noted: 3/20/2025  Today's Date: 2025  Patient seen for 20 sessions           Subjective Evaluation    History of Present Illness    Subjective comment: 4/10 in the lateral and medial aspects of the R knee.       Objective   See Exercise, Manual, and Modality Logs for complete treatment.       Assessment & Plan       Assessment  Assessment details: Pt cont to have pain in the knee at both the lateral and medial aspects of the knee. There is good tolerance to most activities. There was difficulty with the step overs at L2 so that was changed to L1 and was more tolerant.  There is still some challenges with balance and strength still seen but pt cont to work toward improvement in these areas.          Visit Diagnoses:    ICD-10-CM ICD-9-CM   1. Acute pain of right knee  M25.561 719.46   2. Decreased range of motion of right knee  M25.661 719.56   3. S/P arthroscopic partial lateral meniscectomy of right knee  Z98.890 V45.89    Z87.828 V15.59   4. Weakness of right leg  R29.898 729.89       Progress per Plan of Care    Timed:    Therapeutic Exercise:    12     mins  45806;  Manual Therapy:         mins  17688;     Neuromuscular Mamie:   14    mins  12443;    Therapeutic Activity:     14     mins  30634;     Gait Training:           mins  81587;     Ultrasound:          mins  08457;      Untimed:  Electrical Stimulation:         mins  30795 ( );  Mechanical Traction:         mins  09828;   Group           mins  53782    Timed Treatment:   40   mins   Total Treatment:     42   mins      Leigha Diallo PTA  Physical Therapist Assistant

## 2025-06-12 ENCOUNTER — OFFICE VISIT (OUTPATIENT)
Dept: ORTHOPEDIC SURGERY | Facility: CLINIC | Age: 55
End: 2025-06-12
Payer: COMMERCIAL

## 2025-06-12 VITALS
HEART RATE: 74 BPM | OXYGEN SATURATION: 96 % | DIASTOLIC BLOOD PRESSURE: 80 MMHG | SYSTOLIC BLOOD PRESSURE: 120 MMHG | HEIGHT: 73 IN | WEIGHT: 240 LBS | BODY MASS INDEX: 31.81 KG/M2

## 2025-06-12 DIAGNOSIS — Z48.89 AFTERCARE FOLLOWING SURGERY: Primary | ICD-10-CM

## 2025-06-12 PROCEDURE — 99024 POSTOP FOLLOW-UP VISIT: CPT | Performed by: PHYSICIAN ASSISTANT

## 2025-06-12 NOTE — PROGRESS NOTES
"Chief Complaint  Follow-up of the Right Knee    Subjective          History of Present Illness      Rohtih Henley is a 55 y.o. 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  He states it is weak mainly with activities that the therapy team have been putting him through.  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    No Known Allergies     Social History     Socioeconomic History    Marital status:    Tobacco Use    Smoking status: Never    Smokeless tobacco: Never   Vaping Use    Vaping status: Never Used   Substance and Sexual Activity    Alcohol use: No    Drug use: Never    Sexual activity: Defer        REVIEW OF SYSTEMS    Constitutional: Awake alert and oriented x3, no acute distress, denies fevers, chills, weight loss  Respiratory: No respiratory distress  Vascular: Brisk cap refill, Intact distal pulses, No cyanosis, compartments soft with no signs or symptoms of compartment syndrome or DVT.   Cardiovascular: Denies chest pain, shortness of breath  Skin: Denies rashes, acute skin changes  Neurologic: Denies headache, loss of consciousness  MSK: Right knee pain      Objective   Vital Signs:   /80   Pulse 74   Ht 185.4 cm (72.99\")   Wt 109 kg (240 lb)   SpO2 96%   BMI 31.67 kg/m²     Body mass index is 31.67 kg/m².    Physical Exam       Right knee: Well-healed incisions, no erythema, no ecchymosis or swelling, no signs of infection full extension flexion 120, stable to varus/valgus stress stable anterior/posterior drawer      Procedures    Imaging Results (Most Recent)       None                   Assessment and Plan    Diagnoses and all orders for this visit:    1. Aftercare following surgery right knee arthroscopy " with partial lateral menisectomy and chondroplasty performed on 03/17/25. (Primary)        Discussed diagnosis and treatment options with the patient patient states he does not feel ready to return to work he would like to continue therapy he will continue meloxicam, follow-up in 6 weeks for recheck he was given a work note to remain off of work per his request    Call or return if worsening symptoms.    Follow Up   Return in about 6 weeks (around 7/24/2025) for Recheck.  Patient was given instructions and counseling regarding his condition or for health maintenance advice. Please see specific information pulled into the AVS if appropriate.       EMR Dragon/Transcription disclaimer:  Part of this note may be an electronic transcription/translation of spoken language to printed text using the Dragon Dictation System

## 2025-06-23 ENCOUNTER — TELEPHONE (OUTPATIENT)
Dept: PHYSICAL THERAPY | Facility: CLINIC | Age: 55
End: 2025-06-23
Payer: COMMERCIAL

## 2025-06-25 ENCOUNTER — TREATMENT (OUTPATIENT)
Dept: PHYSICAL THERAPY | Facility: CLINIC | Age: 55
End: 2025-06-25
Payer: COMMERCIAL

## 2025-06-25 ENCOUNTER — TELEPHONE (OUTPATIENT)
Dept: ORTHOPEDIC SURGERY | Facility: CLINIC | Age: 55
End: 2025-06-25
Payer: COMMERCIAL

## 2025-06-25 DIAGNOSIS — R29.898 WEAKNESS OF RIGHT LEG: ICD-10-CM

## 2025-06-25 DIAGNOSIS — Z98.890 S/P ARTHROSCOPIC PARTIAL LATERAL MENISCECTOMY OF RIGHT KNEE: ICD-10-CM

## 2025-06-25 DIAGNOSIS — Z87.828 S/P ARTHROSCOPIC PARTIAL LATERAL MENISCECTOMY OF RIGHT KNEE: ICD-10-CM

## 2025-06-25 DIAGNOSIS — M25.661 DECREASED RANGE OF MOTION OF RIGHT KNEE: ICD-10-CM

## 2025-06-25 DIAGNOSIS — M25.561 ACUTE PAIN OF RIGHT KNEE: Primary | ICD-10-CM

## 2025-06-25 NOTE — TELEPHONE ENCOUNTER
Caller: MYRIAM   Relationship to Patient: SELF  Phone Number: [phone]  Reason for Call: PATIENT STATES THAT HE HAS STOPPED RECEIVING PAYMENT FROM SHORT TERM DISABILITY DUE TO HIS PAPERWORK TO EXTEND IT 6 WEEKS STATES THAT IT WAS VOLUNTARY FROM THE PATIENT BUT PATIENT STATES THAT IT WAS AGREED UPON WITH THE PROVIDER AND HE ALSO STATES THAT HIS NOTES STATE HE HAS FULL ROM BUT HIS PT PAPERWORK STATES HE HAS DECREASED ROM STATES THAT THEY ALSO DISCUSSED A KNEE REPLACEMENT AND THIS IS NOT ON HIS NOTES

## 2025-06-25 NOTE — PROGRESS NOTES
Re-Assessment / Re-Certification  [facility], [address], [place] [address]      Patient: Rohith Henley   : 1970  Diagnosis/ICD-10 Code:  Acute pain of right knee [M25.561]  Referring practitioner: Devon Camejo MD  Date of Initial Visit: Type: THERAPY  Noted: 3/20/2025  Today's Date: 2025  Patient seen for 21 sessions      Subjective:     Visit Diagnoses:    ICD-10-CM ICD-9-CM   1. Acute pain of right knee  M25.561 719.46   2. Decreased range of motion of right knee  M25.661 719.56   3. S/P arthroscopic partial lateral meniscectomy of right knee  Z98.890 V45.89    Z87.828 V15.59   4. Weakness of right leg  R29.898 729.89       Clinical Progress: improved  Home Program Compliance: Yes  Treatment has included: therapeutic exercise, neuromuscular re-education, manual therapy, therapeutic activity, and gait training      Subjective Evaluation    History of Present Illness  Mechanism of injury: Patient's pain today is 3-4/10, aching pain.   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  He thinks the front/back is not as bad, he still knows the knee is weak, but the lateral movements hurt.      Overall, patient feels that PT has been helpful for him. He notes an improvement of 70-75% since starting therapy.    Patient has noted improvements with walking straight on flat ground.      Patient is still having difficulty with weakness. He is trying to walk more, walking on level and unlevel surfaces, trying to take a dog for a walk, but maybe last 1.5 blocks.  The knee had a big, heavy, aching pain.  He has tried to  heavy things and move them, but he felt like the knee was going to go.  He notes his balance is horrible.     Returns back to the doctor 25.           Objective           General Comments     Knee Comments  R knee AROM  Flexion: 121 degrees  Extension: 0 degrees    R knee MMT  Flexion: 4+/5  Extension: 4/5    R hip MMT  Flexion: 4+/5  Extension:  4-/5  Abduction: 4/5  Adduction: 4+/5    WB Status: WBAT and patient is no longer using an assistive device  Limp still noted with the RLE, decreased knee extension during ambulation and standing.    Swelling: mild swelling in the R knee    Radicular Symptoms: none noted    Tenderness: medial and lateral joint line R knee       Assessment & Plan       Assessment  Impairments: abnormal gait, abnormal or restricted ROM, activity intolerance, impaired balance, impaired physical strength, lacks appropriate home exercise program and pain with function   Functional limitations: lifting, sleeping, walking, uncomfortable because of pain, sitting, standing, stooping and unable to perform repetitive tasks   Assessment details: Ronald is progressing, but he is still having pain in the R knee.  The knee ROM is WFL in both directions.  Decreased extension with ambulation and standing activities, but more lack of strength than ROM.  He still is getting increased swelling in the R knee, especially with being on his feet or increased activity.  He continues to limp and unable to get full extension during ambulation.  Ronald has more difficulty with lateral movements and pivoting than he does with forwards/backwards movement.  Strength and balance continue to be his limitations.  He goes back to ortho 7/24/25.  Patient will continue to benefit from further PT services to address their remaining deficits noted and progress to achieve their goals.     Goals  Plan Goals: KNEE PROBLEMS:     1. The patient has limited ROM of the right knee.   LTG 1: 12 weeks:  The patient will demonstrate 0 to 120 degrees of ROM for the right knee in order to allow patient to perform ADLs and navigate stairs.    STATUS: MET   STG 1a: 4 weeks:  The patient will demonstrate -3 to 105 degrees of ROM for the right knee.    STATUS:  MET   TREATMENT: Manual therapy, therapeutic exercise, home exercise instruction, and modalities as needed to include:  moist heat,  electrical stimulation, ultrasound, and ice.    2. The patient has limited strength of the right knee.   LTG 2: 12 weeks: The patient will demonstrate 4+ /5 strength for right knee flexion and extension in order to allow patient improved joint stability    STATUS:  Progressing   STG 2a: 4 weeks: The patient will demonstrate 4 /5 strength for right knee flexion and extension    STATUS:  MET  STG2b:  4 weeks:  The patient will be independent with home exercises.     STATUS:  Progressing   TREATMENT: Manual therapy, therapeutic exercise, home exercise instruction, aquatic therapy, and modalities as needed to include:  moist heat, electrical stimulation, ultrasound, and ice.     3. The patient has gait dysfunction.   LTG 3: 12 weeks:  The patient will ambulate without assistive device, independently, for community distances with minimal limp to the right lower extremity in order to improve mobility and allow patient to perform activities such as grocery shopping with greater ease.    STATUS:  Progressing   TREATMENT: Gait training, aquatic therapy, therapeutic exercise, and home exercise instruction.    4. Mobility: Walking/Moving Around Functional Limitation     LTG 4: 12 weeks:  The patient will demonstrate decreased limitation by achieving a score of 55/80 on the LEFS.    STATUS:  Progressing   STG 4 a: 4 weeks:  The patient will demonstrate decreased limitation by achieving a score of 25/80 on the LEFS.      STATUS:  MET   TREATMENT:  Manual therapy, therapeutic exercise, home exercise instruction, and modalities as needed.      Plan  Therapy options: will be seen for skilled therapy services  Planned modality interventions: cryotherapy, dry needling, electrical stimulation/Russian stimulation, TENS, thermotherapy (hydrocollator packs) and ultrasound  Planned therapy interventions: ADL retraining, balance/weight-bearing training, body mechanics training, flexibility, functional ROM exercises, gait training, home  exercise program, IADL retraining, joint mobilization, manual therapy, neuromuscular re-education, postural training, soft tissue mobilization, strengthening, stretching and therapeutic activities  Frequency: 2x week  Duration in weeks: 8  Treatment plan discussed with: patient  Plan details: Review HEP, update as needed.    Progress with R knee and BLE strength, trunk control/postural awareness, balance and gait training, coordination, increased stamina, decreased tightness, improved ROM/flexibility, education as needed.    Progress ROM and strength in different planes of movement.           Progress toward previous goals: Partially Met      Recommendations: Continue as planned  Timeframe: 2 months  Prognosis to achieve goals: good    Timed:  Manual Therapy:    8     mins  84953;  Therapeutic Exercise:    8     mins  69379;     Neuromuscular Mamie:       mins  21355;    Therapeutic Activity:     8     mins  47590;     Gait Training:           mins  29635;     Ultrasound:                          mins  10972;  Self Care:           mins  18747          Un-timed:  Electrical Stimulation:         mins  19263 ( );  Dry Needling          mins self-pay;  Mechanical Traction           mins  36772;  Low Eval          mins 68574;  Moderate Eval          mins 87926;  High Eval          mins 08099;  Re-Eval      7    mins 69032;  Canalith Repos        mins 72391      Timed Treatment:   24   mins   Total Treatment:     31  mins        PT SIGNATURE: Emy Cordova PT, DPT  [place] License: [id number]      90 Day Recertification     Certification Period: 6/25/2025 thru 9/22/2025  I certify that the therapy services are furnished while this patient is under my care.  The services outlined above are required by this patient, and will be reviewed every 90 days.     PHYSICIAN: Devon Camejo MD  NPI: [id number]      PHYSICIAN PRINT NAME: ______________________________________________      PHYSICIAN SIGNATURE:  ______________________________________________         DATE:________________________________        Please sign and return via fax to [phone].  Thank you, [facility].

## 2025-06-27 ENCOUNTER — TREATMENT (OUTPATIENT)
Dept: PHYSICAL THERAPY | Facility: CLINIC | Age: 55
End: 2025-06-27
Payer: COMMERCIAL

## 2025-06-27 DIAGNOSIS — M25.561 ACUTE PAIN OF RIGHT KNEE: Primary | ICD-10-CM

## 2025-06-27 DIAGNOSIS — M25.661 DECREASED RANGE OF MOTION OF RIGHT KNEE: ICD-10-CM

## 2025-06-27 DIAGNOSIS — R29.898 WEAKNESS OF RIGHT LEG: ICD-10-CM

## 2025-06-27 DIAGNOSIS — Z87.828 S/P ARTHROSCOPIC PARTIAL LATERAL MENISCECTOMY OF RIGHT KNEE: ICD-10-CM

## 2025-06-27 DIAGNOSIS — Z98.890 S/P ARTHROSCOPIC PARTIAL LATERAL MENISCECTOMY OF RIGHT KNEE: ICD-10-CM

## 2025-06-27 NOTE — PROGRESS NOTES
Physical Therapy Daily Treatment Note  [facility], [address], [place] [address]      Patient: Rohith Henley   : 1970  Referring practitioner: Devon Camejo MD  Date of Initial Visit: Type: THERAPY  Noted: 3/20/2025  Today's Date: 2025  Patient seen for 22 sessions           Visit Diagnoses:    ICD-10-CM ICD-9-CM   1. Acute pain of right knee  M25.561 719.46   2. Decreased range of motion of right knee  M25.661 719.56   3. S/P arthroscopic partial lateral meniscectomy of right knee  Z98.890 V45.89    Z87.828 V15.59   4. Weakness of right leg  R29.898 729.89       Subjective Evaluation    History of Present Illness    Subjective comment: 2/10 pain on the medial side of the knee.  Strength is his biggest limitation         Objective   See Exercise, Manual, and Modality Logs for complete treatment.       Assessment & Plan       Assessment  Assessment details: The R knee buckled on him with step downs at a low level, but he did not fall.  Strength continues to be a challenge for him with the R quad.  Stair navigation continued in both directions today, really able to see the weakness in the R quad.         Progress per Plan of Care and Progress strengthening /stabilization /functional activity             Timed:  Manual Therapy:         mins  66781;  Therapeutic Exercise:    11     mins  36145;     Neuromuscular Mamie:   10    mins  81946;    Therapeutic Activity:     10     mins  14413;     Gait Training:           mins  64727;     Ultrasound:                         mins  89909;  Self Care:           mins  77606          Un-timed:  Electrical Stimulation:         mins  15087 ( );  Dry Needling          mins self-pay;  Mechanical Traction           mins  27052;  Low Eval          mins 80464;  Moderate Eval          mins 70308;  High Eval          mins 80652;  Re-Eval          mins 34822;  Canalith Repos         mins 43557      Timed Treatment:   31   mins   Total Treatment:      31   mins    Emy Cordova, PT, DPT  [place] License #: [id number]

## 2025-06-30 ENCOUNTER — TREATMENT (OUTPATIENT)
Dept: PHYSICAL THERAPY | Facility: CLINIC | Age: 55
End: 2025-06-30
Payer: COMMERCIAL

## 2025-06-30 DIAGNOSIS — Z87.828 S/P ARTHROSCOPIC PARTIAL LATERAL MENISCECTOMY OF RIGHT KNEE: ICD-10-CM

## 2025-06-30 DIAGNOSIS — R29.898 WEAKNESS OF RIGHT LEG: ICD-10-CM

## 2025-06-30 DIAGNOSIS — M25.561 ACUTE PAIN OF RIGHT KNEE: Primary | ICD-10-CM

## 2025-06-30 DIAGNOSIS — M25.661 DECREASED RANGE OF MOTION OF RIGHT KNEE: ICD-10-CM

## 2025-06-30 DIAGNOSIS — Z98.890 S/P ARTHROSCOPIC PARTIAL LATERAL MENISCECTOMY OF RIGHT KNEE: ICD-10-CM

## 2025-06-30 NOTE — PROGRESS NOTES
"Outpatient Physical Therapy                   Physical Therapy Daily Treatment Note    Patient: Rohith Henley   : 1970  Diagnosis/ICD-10 Code:  Acute pain of right knee [M25.561]  Referring practitioner: Devon Camejo MD  Date of Initial Visit: Type: THERAPY  Noted: 3/20/2025  Today's Date: 2025  Patient seen for 23 sessions             Subjective   Rohith Henley reports: he has been trying to push it a little more. He continues to have difficulty going down stairs due to strength. He also notes on Friday afternoon he pivoted on his leg during a turn and noticed that his knee has been popping some since. He states it occurs approximally 8 times a day and it causes a quick sharp pain that \"is enough to get my attention\" but not too severe. Pt states the pop happens when straightening his knee from a bent position. He also carried several cases of can goods Friday night and Saturday which may have been pushing his knee at this point. He feels balance and strength are his biggest deficits at this point.     Pain: 2/10 achy pain, at time of arrival.     Objective     See Exercise, Manual, and Modality Logs for complete treatment.     Assessment/Plan  Rohith still experiencing increased right knee pain and occasional popping. Pt tolerated exercises well, with some discomfort with TKE's. Modifications were implemented to help reduce this pain. Pt would benefit from skilled PT to address Range of Motion and Strength deficits, pain management and any concerns with ADLs.       Progress per Plan of Care      Timed:  Manual Therapy:    0     mins  07980;  Therapeutic Exercise:    17     mins  19241;  Neuromuscular Mamie:    0    mins  11577;  Therapeutic Activity:     9     mins  87123;  Self care:                       0     mins  14864;  Gait Trainin     mins  98849;  Ultrasound:                    0     mins  50476;    Untimed:  Mechanical Traction:    0     mins  44552;  Electrical " Stimulation:    0     mins  52734 ( );      Timed Treatment:   26   mins  Total Treatment:     26   mins      Electronically signed:   Mackenzie Whitten PTA  Physical Therapist Assistant  Leslie BEAL License #: [id number]

## 2025-07-09 ENCOUNTER — TELEPHONE (OUTPATIENT)
Age: 55
End: 2025-07-09
Payer: COMMERCIAL

## 2025-07-09 NOTE — TELEPHONE ENCOUNTER
CALLED PATIENT - LVM TO CALL OFFICE TO RESCHEDULE APPT.  PROVIDER OUT OF OFFICE.  HUB OKAY TO RESCHEDULE APPT WITH [facility] MAY FIRST AVAILABLE.

## 2025-07-09 NOTE — TELEPHONE ENCOUNTER
Spoke to patient. Notified patient that unfortunately note would not be able to be changed, per provider. Patient verbalized understanding. Patient states PT was able to help him.

## 2025-07-24 ENCOUNTER — HOSPITAL ENCOUNTER (OUTPATIENT)
Dept: GENERAL RADIOLOGY | Facility: HOSPITAL | Age: 55
Discharge: HOME | End: 2025-07-24
Payer: COMMERCIAL

## 2025-07-24 ENCOUNTER — TRANSCRIBE ORDERS (OUTPATIENT)
Dept: ADMINISTRATIVE | Facility: HOSPITAL | Age: 55
End: 2025-07-24
Payer: COMMERCIAL

## 2025-07-24 ENCOUNTER — LAB (OUTPATIENT)
Dept: LAB | Facility: HOSPITAL | Age: 55
End: 2025-07-24
Payer: COMMERCIAL

## 2025-07-24 ENCOUNTER — OFFICE VISIT (OUTPATIENT)
Age: 55
End: 2025-07-24
Payer: COMMERCIAL

## 2025-07-24 VITALS — HEIGHT: 73 IN | WEIGHT: 252 LBS | BODY MASS INDEX: 33.4 KG/M2

## 2025-07-24 DIAGNOSIS — Z48.89 AFTERCARE FOLLOWING SURGERY: Primary | ICD-10-CM

## 2025-07-24 DIAGNOSIS — M25.561 RIGHT KNEE PAIN, UNSPECIFIED CHRONICITY: Primary | ICD-10-CM

## 2025-07-24 DIAGNOSIS — E10.9 INSULIN DEPENDENT DIABETES MELLITUS TYPE IA: Primary | ICD-10-CM

## 2025-07-24 DIAGNOSIS — M25.561 RIGHT KNEE PAIN, UNSPECIFIED CHRONICITY: ICD-10-CM

## 2025-07-24 DIAGNOSIS — E78.5 HYPERLIPIDEMIA, UNSPECIFIED HYPERLIPIDEMIA TYPE: ICD-10-CM

## 2025-07-24 DIAGNOSIS — E10.9 INSULIN DEPENDENT DIABETES MELLITUS TYPE IA: ICD-10-CM

## 2025-07-24 LAB
ALBUMIN SERPL-MCNC: 4 G/DL (ref 3.5–5.2)
ALBUMIN UR-MCNC: <1.2 MG/DL
ALBUMIN/GLOB SERPL: 1.4 G/DL
ALP SERPL-CCNC: 78 U/L (ref 39–117)
ALT SERPL W P-5'-P-CCNC: 23 U/L (ref 1–41)
ANION GAP SERPL CALCULATED.3IONS-SCNC: 8 MMOL/L (ref 5–15)
AST SERPL-CCNC: 21 U/L (ref 1–40)
BILIRUB SERPL-MCNC: 0.3 MG/DL (ref 0–1.2)
BUN SERPL-MCNC: 9 MG/DL (ref 6–20)
BUN/CREAT SERPL: 9.1 (ref 7–25)
CALCIUM SPEC-SCNC: 9 MG/DL (ref 8.6–10.5)
CHLORIDE SERPL-SCNC: 104 MMOL/L (ref 98–107)
CHOLEST SERPL-MCNC: 121 MG/DL (ref 0–200)
CO2 SERPL-SCNC: 25 MMOL/L (ref 22–29)
CREAT SERPL-MCNC: 0.99 MG/DL (ref 0.76–1.27)
CREAT UR-MCNC: 57.5 MG/DL
EGFRCR SERPLBLD CKD-EPI 2021: 90 ML/MIN/1.73
GLOBULIN UR ELPH-MCNC: 2.8 GM/DL
GLUCOSE SERPL-MCNC: 237 MG/DL (ref 65–99)
HDLC SERPL-MCNC: 54 MG/DL (ref 40–60)
LDLC SERPL CALC-MCNC: 49 MG/DL (ref 0–100)
LDLC/HDLC SERPL: 0.89 {RATIO}
MICROALBUMIN/CREAT UR: NORMAL MG/G{CREAT}
POTASSIUM SERPL-SCNC: 4.6 MMOL/L (ref 3.5–5.2)
PROT SERPL-MCNC: 6.8 G/DL (ref 6–8.5)
SODIUM SERPL-SCNC: 137 MMOL/L (ref 136–145)
TRIGL SERPL-MCNC: 95 MG/DL (ref 0–150)
TSH SERPL DL<=0.05 MIU/L-ACNC: 1.09 UIU/ML (ref 0.27–4.2)
VLDLC SERPL-MCNC: 18 MG/DL (ref 5–40)

## 2025-07-24 PROCEDURE — 36415 COLL VENOUS BLD VENIPUNCTURE: CPT

## 2025-07-24 PROCEDURE — 84443 ASSAY THYROID STIM HORMONE: CPT

## 2025-07-24 PROCEDURE — 82043 UR ALBUMIN QUANTITATIVE: CPT

## 2025-07-24 PROCEDURE — 82570 ASSAY OF URINE CREATININE: CPT

## 2025-07-24 PROCEDURE — 80053 COMPREHEN METABOLIC PANEL: CPT

## 2025-07-24 PROCEDURE — 80061 LIPID PANEL: CPT

## 2025-07-24 PROCEDURE — 73562 X-RAY EXAM OF KNEE 3: CPT

## 2025-07-24 NOTE — PROGRESS NOTES
Chief Complaint  Follow-up and Pain of the Right Knee    Subjective      Rohith Henley presents to [facility] for 4-month evaluation of right knee arthroscopy partial lateral meniscectomy and chondroplasty performed by Dr. Camejo on 3/17/2025 at [facility].      History of Present Illness  The patient is a 55-year-old male who is here for a follow-up regarding his right knee. He underwent right knee arthroscopy, partial lateral meniscectomy, and chondroplasty on 03/17/2025.    He reports persistent pain in his right knee, which he describes as a sensation of the kneecap feeling unstable. This issue has been ongoing since his surgery. He recalls an incident yesterday where he was sitting in bed and felt a pop in his knee, followed by pain. His physical therapist informed him that swelling could persist for up to a year post-surgery. He also experiences intermittent swelling in the knee. 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 He is attempting to transfer out of this department due to concerns about his knee. He has been informed that he may regain 60% of his knee function. He has not experienced any new injuries or falls. He continues to struggle with walking up and down stairs and has not been performing home exercises.  He has stopped physical therapy due to concern of insurance coverage.  He reports having 5 physical therapy appointments remaining for the year. He reports difficulty in getting down on the ground and experiences a grinding sensation in his knee. He rates his pain as a 4 on a scale of 1 to 10. He also reports instances where his knee gives out while walking, a problem that predates his surgery.    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        No  "Known Allergies    Objective     Vital Signs:   Vitals:    07/24/25 1601   Weight: 114 kg (252 lb)   Height: 185.4 cm (73\")     Body mass index is 33.25 kg/m².    I reviewed the patient's chief complaint, history of present illness, review of systems, past medical history, surgical history, family history, social history, medications, and allergy list.     Ortho Exam  Knee Scope   General: Alert, no acute distress  Right lower extremity: Well-healed incisional scars no redness or active drainage noted.  Mild knee effusion.  Moderate tenderness to the medial joint line.  Mild tenderness to the lateral joint line. 120 degrees of flexion. -5 degrees extension. Knee extensor mechanism intact. Knee stable to varus and valgus stress. Calf soft, nontender. Demonstrates active ankle plantarflexion and dorsiflexion. Sensation intact over the dorsal and plantar foot.  Palpable pedal pulses.          Physical Exam  Musculoskeletal:  Right knee: Mild swelling noted on the top of the knee. Reports of knee instability and pain with certain movements. Quadriceps muscle weakness observed.      Procedures      Imaging Results (Most Recent)       None               Results           Assessment and Plan   Diagnoses and all orders for this visit:    1. Aftercare following surgery right knee arthroscopy with partial lateral menisectomy and chondroplasty performed on 03/17/25. (Primary)    2. Right knee pain, unspecified chronicity         Assessment & Plan  Rohith Henley presents to [facility] for 4-month evaluation of right knee arthroscopy partial lateral meniscectomy and chondroplasty performed by Dr. Camejo on 3/17/2025 at [facility].  Patient discusses concern of unsatisfactory knee arthroscopy intervention.  Additionally he notes a total of 3 right knee scopes and being informed from a previous orthopedic surgeon that he would need a total knee replacement.  During office " visit additional discussion regarding unable to return to work due to instability and ongoing constant pain of right knee.  Furthermore he notes the company he works for does not allow employees to return to work unless it is without any restrictions and therefore he would be unable to wear a brace for support.    Pain and occasional swelling in the right knee are reported, which can fluctuate over time. Instability and weakness in the knee are noted, especially during walking or certain movements. There is a concern about the knee giving out and difficulty with stairs. The quadriceps muscle is weak, impacting knee stability. Home exercises should be resumed twice daily, focusing on single leg lifts to strengthen the quadriceps. Icing the knee is recommended to manage swelling. A standing x-ray of the right knee will be ordered to compare with previous imaging and assess joint spacing and any potential interruptions.  Advised patient to contact the insurance company for clarification regarding physical therapy visits. Continue to avoid activities that involve twisting motions or heavy lifting that could exacerbate the knee condition.     Work note was provided until next evaluation.  Follow-up: Next evaluation with Dr. Camejo to discuss further potential conservative treatment or further interventions.        Tobacco Use: Low Risk  (7/24/2025)    Patient History     Smoking Tobacco Use: Never     Smokeless Tobacco Use: Never     Passive Exposure: Not on file     Patient reports that they are a nonsmoker; cessation education not applicable.          Patient was given instructions and counseling regarding his condition or for health maintenance advice. Please see specific information pulled into the AVS if appropriate.       Patient or patient representative verbalized consent for the use of Ambient Listening during the visit with  GORDO Ponce for chart documentation. 7/28/2025  11:30 ALESSIA AGUILAR  GORDO Larios   07/24/2025  16:05 EDT    Dictated Utilizing Dragon Dictation. Please note that portions of this note were completed with a voice recognition program. Part of this note may be an electronic transcription/translation of spoken language to printed text using the Dragon Dictation System.

## 2025-07-28 ENCOUNTER — TELEPHONE (OUTPATIENT)
Dept: ORTHOPEDIC SURGERY | Facility: CLINIC | Age: 55
End: 2025-07-28

## 2025-08-01 ENCOUNTER — OFFICE VISIT (OUTPATIENT)
Dept: ORTHOPEDIC SURGERY | Facility: CLINIC | Age: 55
End: 2025-08-01
Payer: COMMERCIAL

## 2025-08-01 VITALS
SYSTOLIC BLOOD PRESSURE: 127 MMHG | HEART RATE: 71 BPM | HEIGHT: 73 IN | BODY MASS INDEX: 33.66 KG/M2 | DIASTOLIC BLOOD PRESSURE: 85 MMHG | WEIGHT: 254 LBS | OXYGEN SATURATION: 98 %

## 2025-08-01 DIAGNOSIS — Z48.89 AFTERCARE FOLLOWING SURGERY: ICD-10-CM

## 2025-08-01 DIAGNOSIS — M17.11 PRIMARY OSTEOARTHRITIS OF RIGHT KNEE: Primary | ICD-10-CM

## 2025-08-01 RX ADMIN — TRIAMCINOLONE ACETONIDE 40 MG: 40 INJECTION, SUSPENSION INTRA-ARTICULAR; INTRAMUSCULAR at 09:02

## 2025-08-01 RX ADMIN — LIDOCAINE HYDROCHLORIDE 5 ML: 10 INJECTION, SOLUTION EPIDURAL; INFILTRATION; INTRACAUDAL; PERINEURAL at 09:02

## 2025-08-01 NOTE — PROGRESS NOTES
"Chief Complaint  Follow-up of the Right Knee       Subjective      Rohith Henley presents to [facility] for a follow up for his right knee. He underwent a right knee arthroscopy with partial lateral  menisectomy and chondroplasty performed on 03/17/25. He states he is still having pain to his right knee. He states his right knee still catches and tomasa on him. He has been attending outpatient physical therapy. He states his right knee feels weak. He has had two prior arthroscopies prior to his recent surgery. He has grinding with range of motion and locates his pain to the patella region.     No Known Allergies     Social History     Socioeconomic History    Marital status:    Tobacco Use    Smoking status: Never    Smokeless tobacco: Never   Vaping Use    Vaping status: Never Used   Substance and Sexual Activity    Alcohol use: No    Drug use: Never    Sexual activity: Defer        I reviewed the patient's chief complaint, history of present illness, review of systems, past medical history, surgical history, family history, social history, medications, and allergy list.     Review of Systems     Constitutional: Denies fevers, chills, weight loss  Cardiovascular: Denies chest pain, shortness of breath  Skin: Denies rashes, acute skin changes  Neurologic: Denies headache, loss of consciousness  MSK: right knee pain       Vital Signs:   /85 (BP Location: Right arm, Patient Position: Sitting, Cuff Size: Large Adult)   Pulse 71   Ht 185.4 cm (72.99\")   Wt 115 kg (254 lb)   SpO2 98%   BMI 33.52 kg/m²            Ortho Exam    Physical Exam  General:Alert. No acute distress   Right lower extremity: well healed surgicial incision, full extension, flexion  to 120 degrees, stable to varus/valgus stress, stable to anterior/posterior drawer, tender to the medial joint line, negative Lachman's, pain with Jen's, distal neurovascularly intact, positive  pulses, positive EHL, " FHL, GS, and TA. Sensation intact to all 5 nerves of the foot.     Large Joint: R knee  Date/Time: 8/1/2025 9:02 AM  Consent given by: patient  Site marked: site marked  Timeout: Immediately prior to procedure a time out was called to verify the correct patient, procedure, equipment, support staff and site/side marked as required   Supporting Documentation  Indications: pain   Procedure Details  Location: knee - R knee  Preparation: Patient was prepped and draped in the usual sterile fashion  Needle gauge: 21 G.  Medications administered: 40 mg triamcinolone acetonide 40 MG/ML; 5 mL lidocaine PF 1% 1 %      Imaging Results (Most Recent)       None             Result Review :       XR Knee 3 View Right  Result Date: 7/28/2025  Narrative: XR KNEE 3 VW RIGHT Date of Exam: 7/24/2025 5:17 PM EDT Indication: Right knee pain Comparison: 2/7/2025 Findings: There is moderate to severe degenerative change in the lateral and patellofemoral compartments. There is a new moderate suprapatellar effusion. No acute fracture or dislocation identified. No gross osseous lesion or periosteal reaction. No chondrocalcinosis or radiopaque intra-articular body.     Impression: Impression: Moderate to severe degenerative changes. New moderate suprapatellar effusion. Electronically Signed: Juan Singh MD  7/28/2025 1:46 PM EDT  Workstation ID: [id number]             Assessment and Plan     Diagnoses and all orders for this visit:    1. Primary osteoarthritis of right knee (Primary)    2. Aftercare following surgery right knee arthroscopy with partial lateral menisectomy and chondroplasty performed on 03/17/25.        The patient presents here today for a follow up for his right knee.      Discussed operative treatment options regarding a right total knee arthoplasty with teena versus non operative treatment options regarding injections, medications, therapy and bracing.     Patient wishes to proceed with conservative treatment options.       Discussed the risks and benefits of a right knee steroid injection today in the office. Patient expressed understanding and wishes to proceed. Patient tolerted the injection well and without any complications.     Discussed with the patient that due to the steroid injection given today in the office they may see an increase in blood sugar for a few days. Advised patient to monitor sugar after receiving the injection.       Call or return if worsening symptoms.    Follow Up     6 - 8 weeks     Patient was given instructions and counseling regarding his condition or for health maintenance advice. Please see specific information pulled into the AVS if appropriate.     Scribed for Devon Camejo MD by Lise Wynn.  08/01/25   08:39 EDT    I have personally performed the services described in this document as scribed by the above individual and it is both accurate and complete. Devon Camejo MD 08/02/25

## 2025-08-02 RX ORDER — LIDOCAINE HYDROCHLORIDE 10 MG/ML
5 INJECTION, SOLUTION EPIDURAL; INFILTRATION; INTRACAUDAL; PERINEURAL
Status: COMPLETED | OUTPATIENT
Start: 2025-08-01 | End: 2025-08-01

## 2025-08-02 RX ORDER — TRIAMCINOLONE ACETONIDE 40 MG/ML
40 INJECTION, SUSPENSION INTRA-ARTICULAR; INTRAMUSCULAR
Status: COMPLETED | OUTPATIENT
Start: 2025-08-01 | End: 2025-08-01

## 2025-08-28 RX ORDER — MELOXICAM 15 MG/1
15 TABLET ORAL DAILY
Qty: 90 TABLET | Refills: 0 | Status: SHIPPED | OUTPATIENT
Start: 2025-08-28

## (undated) DEVICE — KNEE ARTHROSCOPY-LF: Brand: MEDLINE INDUSTRIES, INC.

## (undated) DEVICE — SYR LUERLOK 30CC

## (undated) DEVICE — BNDG ELAS ECON W/CLIP 6IN 5YD LF STRL

## (undated) DEVICE — BNDG ESMARK STRL 6INX12FT LF

## (undated) DEVICE — DRESSING,GAUZE,XEROFORM,CURAD,1"X8",ST: Brand: CURAD

## (undated) DEVICE — GAUZE,SPONGE,4"X4",16PLY,STRL,LF,10/TRAY: Brand: MEDLINE

## (undated) DEVICE — BLD CUT FORMLA AGGR PLS 5.0MM

## (undated) DEVICE — APPL CHLORAPREP HI/LITE 26ML ORNG

## (undated) DEVICE — GLOVE,SURG,SENSICARE SLT,LF,PF,7.5: Brand: MEDLINE

## (undated) DEVICE — SOL IRR NACL 0.9PCT 3000ML

## (undated) DEVICE — TBG INFLOW/OUTFLOW DUALWAVE 1P/U

## (undated) DEVICE — SUT ETHLN 3-0 FS118IN 663H

## (undated) DEVICE — CUFF TOURNI 1BLADDER 1PRT 34IN STRL

## (undated) DEVICE — GLV SURG SENSICARE PI ORTHO SZ8 LF STRL